# Patient Record
Sex: MALE | Race: WHITE | Employment: PART TIME | URBAN - METROPOLITAN AREA
[De-identification: names, ages, dates, MRNs, and addresses within clinical notes are randomized per-mention and may not be internally consistent; named-entity substitution may affect disease eponyms.]

---

## 2020-11-23 ENCOUNTER — APPOINTMENT (OUTPATIENT)
Dept: CT IMAGING | Age: 60
DRG: 194 | End: 2020-11-23
Attending: STUDENT IN AN ORGANIZED HEALTH CARE EDUCATION/TRAINING PROGRAM
Payer: MEDICAID

## 2020-11-23 ENCOUNTER — APPOINTMENT (OUTPATIENT)
Dept: GENERAL RADIOLOGY | Age: 60
DRG: 194 | End: 2020-11-23
Attending: STUDENT IN AN ORGANIZED HEALTH CARE EDUCATION/TRAINING PROGRAM
Payer: MEDICAID

## 2020-11-23 ENCOUNTER — HOSPITAL ENCOUNTER (INPATIENT)
Age: 60
LOS: 7 days | Discharge: HOME | DRG: 194 | End: 2020-11-30
Attending: EMERGENCY MEDICINE | Admitting: HOSPITALIST
Payer: MEDICAID

## 2020-11-23 DIAGNOSIS — N17.9 AKI (ACUTE KIDNEY INJURY) (HCC): ICD-10-CM

## 2020-11-23 DIAGNOSIS — I50.41 ACUTE COMBINED SYSTOLIC AND DIASTOLIC CONGESTIVE HEART FAILURE (HCC): ICD-10-CM

## 2020-11-23 DIAGNOSIS — I48.91 NEW ONSET ATRIAL FIBRILLATION (HCC): Primary | ICD-10-CM

## 2020-11-23 DIAGNOSIS — Z72.0 TOBACCO ABUSE: ICD-10-CM

## 2020-11-23 DIAGNOSIS — I50.21 ACUTE SYSTOLIC CONGESTIVE HEART FAILURE (HCC): ICD-10-CM

## 2020-11-23 DIAGNOSIS — R06.09 DYSPNEA ON EXERTION: ICD-10-CM

## 2020-11-23 DIAGNOSIS — R77.8 ELEVATED TROPONIN: ICD-10-CM

## 2020-11-23 DIAGNOSIS — I42.8 OTHER CARDIOMYOPATHY (HCC): ICD-10-CM

## 2020-11-23 DIAGNOSIS — N28.89 RENAL MASS: ICD-10-CM

## 2020-11-23 PROBLEM — I48.92 NEW ONSET ATRIAL FLUTTER (HCC): Status: ACTIVE | Noted: 2020-11-23

## 2020-11-23 PROBLEM — I50.9 CHF (CONGESTIVE HEART FAILURE) (HCC): Status: ACTIVE | Noted: 2020-11-23

## 2020-11-23 PROBLEM — I25.10 CAD (CORONARY ARTERY DISEASE): Status: ACTIVE | Noted: 2020-11-23

## 2020-11-23 PROBLEM — I50.9 ACUTE CONGESTIVE HEART FAILURE (HCC): Status: ACTIVE | Noted: 2020-11-23

## 2020-11-23 PROBLEM — I10 ESSENTIAL HYPERTENSION: Status: ACTIVE | Noted: 2020-11-23

## 2020-11-23 PROBLEM — J81.1 PULMONARY EDEMA: Status: ACTIVE | Noted: 2020-11-23

## 2020-11-23 LAB
ALBUMIN SERPL-MCNC: 3.4 G/DL (ref 3.5–5)
ALBUMIN/GLOB SERPL: 1 {RATIO} (ref 1.1–2.2)
ALP SERPL-CCNC: 79 U/L (ref 45–117)
ALT SERPL-CCNC: 40 U/L (ref 12–78)
ANION GAP SERPL CALC-SCNC: 5 MMOL/L (ref 5–15)
APPEARANCE UR: CLEAR
AST SERPL-CCNC: 31 U/L (ref 15–37)
BACTERIA URNS QL MICRO: NEGATIVE /HPF
BASOPHILS # BLD: 0 K/UL (ref 0–0.1)
BASOPHILS NFR BLD: 0 % (ref 0–1)
BILIRUB SERPL-MCNC: 0.9 MG/DL (ref 0.2–1)
BILIRUB UR QL: NEGATIVE
BNP SERPL-MCNC: 6410 PG/ML
BUN SERPL-MCNC: 31 MG/DL (ref 6–20)
BUN/CREAT SERPL: 20 (ref 12–20)
CALCIUM SERPL-MCNC: 8.4 MG/DL (ref 8.5–10.1)
CHLORIDE SERPL-SCNC: 109 MMOL/L (ref 97–108)
CO2 SERPL-SCNC: 23 MMOL/L (ref 21–32)
COLOR UR: ABNORMAL
COMMENT, HOLDF: NORMAL
CREAT SERPL-MCNC: 1.58 MG/DL (ref 0.7–1.3)
DIFFERENTIAL METHOD BLD: NORMAL
EOSINOPHIL # BLD: 0.2 K/UL (ref 0–0.4)
EOSINOPHIL NFR BLD: 2 % (ref 0–7)
EPITH CASTS URNS QL MICRO: ABNORMAL /LPF
ERYTHROCYTE [DISTWIDTH] IN BLOOD BY AUTOMATED COUNT: 13.9 % (ref 11.5–14.5)
GLOBULIN SER CALC-MCNC: 3.5 G/DL (ref 2–4)
GLUCOSE SERPL-MCNC: 103 MG/DL (ref 65–100)
GLUCOSE UR STRIP.AUTO-MCNC: NEGATIVE MG/DL
HCT VFR BLD AUTO: 40.6 % (ref 36.6–50.3)
HGB BLD-MCNC: 13.6 G/DL (ref 12.1–17)
HGB UR QL STRIP: NEGATIVE
HYALINE CASTS URNS QL MICRO: ABNORMAL /LPF (ref 0–5)
IMM GRANULOCYTES # BLD AUTO: 0 K/UL (ref 0–0.04)
IMM GRANULOCYTES NFR BLD AUTO: 0 % (ref 0–0.5)
INR PPP: 1.1 (ref 0.9–1.1)
KETONES UR QL STRIP.AUTO: NEGATIVE MG/DL
LACTATE SERPL-SCNC: 1.4 MMOL/L (ref 0.4–2)
LEUKOCYTE ESTERASE UR QL STRIP.AUTO: NEGATIVE
LIPASE SERPL-CCNC: 67 U/L (ref 73–393)
LYMPHOCYTES # BLD: 2.5 K/UL (ref 0.8–3.5)
LYMPHOCYTES NFR BLD: 24 % (ref 12–49)
MAGNESIUM SERPL-MCNC: 2.2 MG/DL (ref 1.6–2.4)
MCH RBC QN AUTO: 31.8 PG (ref 26–34)
MCHC RBC AUTO-ENTMCNC: 33.5 G/DL (ref 30–36.5)
MCV RBC AUTO: 94.9 FL (ref 80–99)
MONOCYTES # BLD: 1 K/UL (ref 0–1)
MONOCYTES NFR BLD: 10 % (ref 5–13)
NEUTS SEG # BLD: 6.7 K/UL (ref 1.8–8)
NEUTS SEG NFR BLD: 64 % (ref 32–75)
NITRITE UR QL STRIP.AUTO: NEGATIVE
NRBC # BLD: 0 K/UL (ref 0–0.01)
NRBC BLD-RTO: 0 PER 100 WBC
PH UR STRIP: 5 [PH] (ref 5–8)
PLATELET # BLD AUTO: 254 K/UL (ref 150–400)
PMV BLD AUTO: 9.7 FL (ref 8.9–12.9)
POTASSIUM SERPL-SCNC: 4.3 MMOL/L (ref 3.5–5.1)
PROT SERPL-MCNC: 6.9 G/DL (ref 6.4–8.2)
PROT UR STRIP-MCNC: 30 MG/DL
PROTHROMBIN TIME: 11.4 SEC (ref 9–11.1)
RBC # BLD AUTO: 4.28 M/UL (ref 4.1–5.7)
RBC #/AREA URNS HPF: ABNORMAL /HPF (ref 0–5)
SAMPLES BEING HELD,HOLD: NORMAL
SODIUM SERPL-SCNC: 137 MMOL/L (ref 136–145)
SP GR UR REFRACTOMETRY: 1.02 (ref 1–1.03)
TROPONIN I SERPL-MCNC: 0.16 NG/ML
UR CULT HOLD, URHOLD: NORMAL
UROBILINOGEN UR QL STRIP.AUTO: 0.2 EU/DL (ref 0.2–1)
WBC # BLD AUTO: 10.5 K/UL (ref 4.1–11.1)
WBC URNS QL MICRO: ABNORMAL /HPF (ref 0–4)

## 2020-11-23 PROCEDURE — 83880 ASSAY OF NATRIURETIC PEPTIDE: CPT

## 2020-11-23 PROCEDURE — 84484 ASSAY OF TROPONIN QUANT: CPT

## 2020-11-23 PROCEDURE — 81001 URINALYSIS AUTO W/SCOPE: CPT

## 2020-11-23 PROCEDURE — 96375 TX/PRO/DX INJ NEW DRUG ADDON: CPT

## 2020-11-23 PROCEDURE — 96372 THER/PROPH/DIAG INJ SC/IM: CPT

## 2020-11-23 PROCEDURE — 83605 ASSAY OF LACTIC ACID: CPT

## 2020-11-23 PROCEDURE — 80053 COMPREHEN METABOLIC PANEL: CPT

## 2020-11-23 PROCEDURE — 74011250637 HC RX REV CODE- 250/637: Performed by: STUDENT IN AN ORGANIZED HEALTH CARE EDUCATION/TRAINING PROGRAM

## 2020-11-23 PROCEDURE — 83735 ASSAY OF MAGNESIUM: CPT

## 2020-11-23 PROCEDURE — 36415 COLL VENOUS BLD VENIPUNCTURE: CPT

## 2020-11-23 PROCEDURE — 71275 CT ANGIOGRAPHY CHEST: CPT

## 2020-11-23 PROCEDURE — 99285 EMERGENCY DEPT VISIT HI MDM: CPT

## 2020-11-23 PROCEDURE — 85025 COMPLETE CBC W/AUTO DIFF WBC: CPT

## 2020-11-23 PROCEDURE — 85610 PROTHROMBIN TIME: CPT

## 2020-11-23 PROCEDURE — 74011000636 HC RX REV CODE- 636: Performed by: RADIOLOGY

## 2020-11-23 PROCEDURE — 71045 X-RAY EXAM CHEST 1 VIEW: CPT

## 2020-11-23 PROCEDURE — 74011250636 HC RX REV CODE- 250/636: Performed by: STUDENT IN AN ORGANIZED HEALTH CARE EDUCATION/TRAINING PROGRAM

## 2020-11-23 PROCEDURE — 74011000250 HC RX REV CODE- 250: Performed by: STUDENT IN AN ORGANIZED HEALTH CARE EDUCATION/TRAINING PROGRAM

## 2020-11-23 PROCEDURE — 65270000029 HC RM PRIVATE

## 2020-11-23 PROCEDURE — 96365 THER/PROPH/DIAG IV INF INIT: CPT

## 2020-11-23 PROCEDURE — 93005 ELECTROCARDIOGRAM TRACING: CPT

## 2020-11-23 PROCEDURE — 83690 ASSAY OF LIPASE: CPT

## 2020-11-23 RX ORDER — ACETAMINOPHEN 325 MG/1
650 TABLET ORAL
Status: DISCONTINUED | OUTPATIENT
Start: 2020-11-23 | End: 2020-11-30 | Stop reason: HOSPADM

## 2020-11-23 RX ORDER — ONDANSETRON 2 MG/ML
4 INJECTION INTRAMUSCULAR; INTRAVENOUS
Status: DISCONTINUED | OUTPATIENT
Start: 2020-11-23 | End: 2020-11-30 | Stop reason: HOSPADM

## 2020-11-23 RX ORDER — DILTIAZEM HYDROCHLORIDE 30 MG/1
30 TABLET, FILM COATED ORAL
Status: DISCONTINUED | OUTPATIENT
Start: 2020-11-24 | End: 2020-11-24

## 2020-11-23 RX ORDER — DILTIAZEM HYDROCHLORIDE 120 MG/1
120 CAPSULE, COATED, EXTENDED RELEASE ORAL DAILY
Status: DISCONTINUED | OUTPATIENT
Start: 2020-11-24 | End: 2020-11-23

## 2020-11-23 RX ORDER — POLYETHYLENE GLYCOL 3350 17 G/17G
17 POWDER, FOR SOLUTION ORAL DAILY PRN
Status: DISCONTINUED | OUTPATIENT
Start: 2020-11-23 | End: 2020-11-30 | Stop reason: HOSPADM

## 2020-11-23 RX ORDER — DILTIAZEM HYDROCHLORIDE 30 MG/1
30 TABLET, FILM COATED ORAL
Status: COMPLETED | OUTPATIENT
Start: 2020-11-23 | End: 2020-11-23

## 2020-11-23 RX ORDER — ENOXAPARIN SODIUM 100 MG/ML
80 INJECTION SUBCUTANEOUS
Status: COMPLETED | OUTPATIENT
Start: 2020-11-23 | End: 2020-11-23

## 2020-11-23 RX ORDER — SODIUM CHLORIDE 0.9 % (FLUSH) 0.9 %
5-40 SYRINGE (ML) INJECTION EVERY 8 HOURS
Status: DISCONTINUED | OUTPATIENT
Start: 2020-11-23 | End: 2020-11-30 | Stop reason: HOSPADM

## 2020-11-23 RX ORDER — ENOXAPARIN SODIUM 100 MG/ML
40 INJECTION SUBCUTANEOUS DAILY
Status: DISCONTINUED | OUTPATIENT
Start: 2020-11-24 | End: 2020-11-24

## 2020-11-23 RX ORDER — ACETAMINOPHEN 650 MG/1
650 SUPPOSITORY RECTAL
Status: DISCONTINUED | OUTPATIENT
Start: 2020-11-23 | End: 2020-11-30 | Stop reason: HOSPADM

## 2020-11-23 RX ORDER — DILTIAZEM HYDROCHLORIDE 5 MG/ML
10 INJECTION INTRAVENOUS
Status: COMPLETED | OUTPATIENT
Start: 2020-11-23 | End: 2020-11-23

## 2020-11-23 RX ORDER — LISINOPRIL 40 MG/1
40 TABLET ORAL DAILY
COMMUNITY
End: 2020-11-30

## 2020-11-23 RX ORDER — SODIUM CHLORIDE 0.9 % (FLUSH) 0.9 %
5-40 SYRINGE (ML) INJECTION AS NEEDED
Status: DISCONTINUED | OUTPATIENT
Start: 2020-11-23 | End: 2020-11-30 | Stop reason: HOSPADM

## 2020-11-23 RX ORDER — ATORVASTATIN CALCIUM 80 MG/1
80 TABLET, FILM COATED ORAL DAILY
COMMUNITY

## 2020-11-23 RX ORDER — FUROSEMIDE 10 MG/ML
40 INJECTION INTRAMUSCULAR; INTRAVENOUS 2 TIMES DAILY
Status: DISCONTINUED | OUTPATIENT
Start: 2020-11-24 | End: 2020-11-24

## 2020-11-23 RX ORDER — FUROSEMIDE 10 MG/ML
40 INJECTION INTRAMUSCULAR; INTRAVENOUS ONCE
Status: COMPLETED | OUTPATIENT
Start: 2020-11-23 | End: 2020-11-23

## 2020-11-23 RX ADMIN — IOPAMIDOL 80 ML: 755 INJECTION, SOLUTION INTRAVENOUS at 19:48

## 2020-11-23 RX ADMIN — DILTIAZEM HYDROCHLORIDE 30 MG: 30 TABLET, FILM COATED ORAL at 18:25

## 2020-11-23 RX ADMIN — DILTIAZEM HYDROCHLORIDE 10 MG: 5 INJECTION INTRAVENOUS at 18:26

## 2020-11-23 RX ADMIN — ENOXAPARIN SODIUM 80 MG: 80 INJECTION SUBCUTANEOUS at 21:24

## 2020-11-23 RX ADMIN — FUROSEMIDE 40 MG: 10 INJECTION, SOLUTION INTRAMUSCULAR; INTRAVENOUS at 21:24

## 2020-11-23 NOTE — ED PROVIDER NOTES
Clifton Dowell is a 64 y.o. male with past medical history notable for CAD, history of MI, cardiac stents, hypertension. The entirety of his care is at [facility]. He states that he was diagnosed with pneumonia late May and early June 2020 and admitted for. Time, it gradually improved but then over the past month it seems that he is having recurrent identical symptoms of dyspnea on exertion, cough, intermittent fevers with T-max 101 °F.  He has some pleuritic symptoms. Has not had hemoptysis. Denies lower extremity edema present. He has not had a known history of heart failure but he does have notable orthopnea with this current syndrome. Past Medical History:   Diagnosis Date    CAD (coronary artery disease)     Hypertension        Past Surgical History:   Procedure Laterality Date    CARDIAC SURG PROCEDURE UNLIST      stents         No family history on file.     Social History     Socioeconomic History    Marital status: SINGLE     Spouse name: Not on file    Number of children: Not on file    Years of education: Not on file    Highest education level: Not on file   Occupational History    Not on file   Social Needs    Financial resource strain: Not on file    Food insecurity     Worry: Not on file     Inability: Not on file    Transportation needs     Medical: Not on file     Non-medical: Not on file   Tobacco Use    Smoking status: Current Every Day Smoker   Substance and Sexual Activity    Alcohol use: Not on file    Drug use: No    Sexual activity: Not on file   Lifestyle    Physical activity     Days per week: Not on file     Minutes per session: Not on file    Stress: Not on file   Relationships    Social connections     Talks on phone: Not on file     Gets together: Not on file     Attends Hindu service: Not on file     Active member of club or organization: Not on file     Attends meetings of clubs or organizations: Not on file     Relationship status: Not on file   Shena Swain Intimate partner violence     Fear of current or ex partner: Not on file     Emotionally abused: Not on file     Physically abused: Not on file     Forced sexual activity: Not on file   Other Topics Concern    Not on file   Social History Narrative    Not on file         ALLERGIES: Patient has no known allergies. Review of Systems   Constitutional: Negative for chills, fatigue and fever. HENT: Negative for ear pain, sore throat and trouble swallowing. Eyes: Negative for visual disturbance. Respiratory: Positive for cough and shortness of breath. Cardiovascular: Positive for chest pain. Gastrointestinal: Negative for abdominal pain and rectal pain. Genitourinary: Negative for dysuria. Musculoskeletal: Negative for back pain. Skin: Negative for rash. Neurological: Positive for light-headedness. Negative for headaches. Psychiatric/Behavioral: Negative for confusion. All other systems reviewed and are negative. Vitals:    11/23/20 1634   BP: (!) 174/122   Pulse: (!) 155   Resp: 28   Temp: 98 °F (36.7 °C)   SpO2: 96%   Weight: 86.6 kg (191 lb)   Height: 5' 9\" (1.753 m)            Physical Exam  Vitals signs reviewed. Constitutional:       General: He is not in acute distress. HENT:      Head: Normocephalic and atraumatic. Mouth/Throat:      Mouth: Mucous membranes are moist.      Pharynx: Oropharynx is clear. Cardiovascular:      Rate and Rhythm: Tachycardia present. Rhythm irregular. Heart sounds: Normal heart sounds. Pulmonary:      Effort: Pulmonary effort is normal.      Breath sounds: Normal breath sounds. Abdominal:      Tenderness: There is no abdominal tenderness. There is no guarding or rebound. Musculoskeletal: Normal range of motion. Right lower leg: He exhibits no tenderness. No edema. Left lower leg: He exhibits no tenderness. No edema. Skin:     General: Skin is warm and dry. Capillary Refill: Capillary refill takes less than 2 seconds. Neurological:      General: No focal deficit present. Mental Status: He is alert and oriented to person, place, and time. Psychiatric:         Mood and Affect: Mood normal.          MDM  Number of Diagnoses or Management Options  Dyspnea on exertion:   New onset atrial fibrillation ([address]): Other cardiomyopathy [facility]):      Amount and/or Complexity of Data Reviewed  Tests in the medicine section of CPT®: reviewed      ED Course as of Nov 23 1750   Mon Nov 23, 2020   1716 EKG 1641  Atrial flutter with variable AV block, ventricular rate 144, normal axis, no ST elevation or depression. T wave inversion lateral.    [NS]      ED Course User Index  [NS] Liza Cordero MD       Procedures      Perfect Serve Consult for Admission  8:44 PM    ED Room Number: ER15/15  Patient Name and age:  Josiane Liu 64 y.o.  male  Working Diagnosis:   1. New onset atrial fibrillation ([address]    2. Dyspnea on exertion    3. Other cardiomyopathy ([facility]    (rate related cardiomyopathy)    COVID-19 Suspicion:  no  Sepsis present:  no  Reassessment needed: no  Code Status:  Full Code  Readmission: no  Isolation Requirements:  no  Recommended Level of Care:  telemetry  Department:[facility] ED - [phone]  Other:     Josiane Liu is a 64 y.o. male presenting with dyspnea with exertion. Appears to be in rapid tatrial flutter. CTA negative for pulmonary embolus but did have bilateral pleural effusions and vascular congestion. His BNP is elevated. His heart rate has improved with IV and p.o. diltiazem but he persistent his PRIDE. Will start on Lovenox. Would likely benefit from cardiology consultation, echocardiogram.  We will give a dose of Lasix as well.             8:46 PM     I have spent 35 minutes of critical care time involved in lab review, consultations with specialist, family decision-making, and documentation. During this entire length of time I was immediately available to the patient and/or family.     Bess Caraballo Lauren Kumar MD

## 2020-11-23 NOTE — ED TRIAGE NOTES
Patient reports pneumonia dx in may, states was given abx and now has symptoms recurring, states it feels the same as the pneumonia. Patient went to see PCP and was told to come to ER for further eval for rapid HR and elevated BP.

## 2020-11-24 ENCOUNTER — APPOINTMENT (OUTPATIENT)
Dept: ULTRASOUND IMAGING | Age: 60
DRG: 194 | End: 2020-11-24
Attending: INTERNAL MEDICINE
Payer: MEDICAID

## 2020-11-24 ENCOUNTER — APPOINTMENT (OUTPATIENT)
Dept: NON INVASIVE DIAGNOSTICS | Age: 60
DRG: 194 | End: 2020-11-24
Attending: HOSPITALIST
Payer: MEDICAID

## 2020-11-24 PROBLEM — R77.8 ELEVATED TROPONIN: Status: ACTIVE | Noted: 2020-11-24

## 2020-11-24 PROBLEM — I16.0 HYPERTENSIVE URGENCY: Status: ACTIVE | Noted: 2020-11-24

## 2020-11-24 LAB
ALBUMIN SERPL-MCNC: 3.8 G/DL (ref 3.5–5)
ALBUMIN/GLOB SERPL: 1.2 {RATIO} (ref 1.1–2.2)
ALP SERPL-CCNC: 89 U/L (ref 45–117)
ALT SERPL-CCNC: 43 U/L (ref 12–78)
ANION GAP SERPL CALC-SCNC: 7 MMOL/L (ref 5–15)
AST SERPL-CCNC: 34 U/L (ref 15–37)
ATRIAL RATE: 315 BPM
BASOPHILS # BLD: 0 K/UL (ref 0–0.1)
BASOPHILS NFR BLD: 0 % (ref 0–1)
BILIRUB SERPL-MCNC: 1.2 MG/DL (ref 0.2–1)
BNP SERPL-MCNC: 6694 PG/ML
BUN SERPL-MCNC: 31 MG/DL (ref 6–20)
BUN/CREAT SERPL: 18 (ref 12–20)
CALCIUM SERPL-MCNC: 8.6 MG/DL (ref 8.5–10.1)
CALCULATED R AXIS, ECG10: 28 DEGREES
CALCULATED T AXIS, ECG11: 64 DEGREES
CHLORIDE SERPL-SCNC: 107 MMOL/L (ref 97–108)
CHOLEST SERPL-MCNC: 144 MG/DL
CO2 SERPL-SCNC: 25 MMOL/L (ref 21–32)
CREAT SERPL-MCNC: 1.76 MG/DL (ref 0.7–1.3)
DIAGNOSIS, 93000: NORMAL
DIFFERENTIAL METHOD BLD: ABNORMAL
ECHO AO ROOT DIAM: 3.03 CM
ECHO AR MAX VEL PISA: 261.82 CENTIMETER/SECOND
ECHO AV AREA PEAK VELOCITY: 1.04 CM2
ECHO AV AREA/BSA PEAK VELOCITY: 0.5 CM2/M2
ECHO AV PEAK GRADIENT: 7.94 MMHG
ECHO AV PEAK VELOCITY: 140.88 CM/S
ECHO AV REGURGITANT PHT: 545.42 MS
ECHO EST RA PRESSURE: 3 MMHG
ECHO LA AREA 4C: 29.15 CM2
ECHO LA MAJOR AXIS: 4.82 CM
ECHO LA MINOR AXIS: 2.38 CM
ECHO LA VOL 2C: 89.21 ML (ref 18–58)
ECHO LA VOL 4C: 92.57 ML (ref 18–58)
ECHO LA VOL BP: 104.93 ML (ref 18–58)
ECHO LA VOL/BSA BIPLANE: 51.92 ML/M2 (ref 16–28)
ECHO LA VOLUME INDEX A2C: 44.14 ML/M2 (ref 16–28)
ECHO LA VOLUME INDEX A4C: 45.8 ML/M2 (ref 16–28)
ECHO LV E' LATERAL VELOCITY: 7.91 CENTIMETER/SECOND
ECHO LV E' SEPTAL VELOCITY: 2.76 CENTIMETER/SECOND
ECHO LV INTERNAL DIMENSION DIASTOLIC: 6.19 CM (ref 4.2–5.9)
ECHO LV INTERNAL DIMENSION SYSTOLIC: 5.67 CM
ECHO LV IVSD: 1.05 CM (ref 0.6–1)
ECHO LV MASS 2D: 268.8 G (ref 88–224)
ECHO LV MASS INDEX 2D: 133 G/M2 (ref 49–115)
ECHO LV POSTERIOR WALL DIASTOLIC: 1 CM (ref 0.6–1)
ECHO LVOT DIAM: 1.88 CM
ECHO LVOT PEAK GRADIENT: 1.1 MMHG
ECHO LVOT PEAK VELOCITY: 52.49 CM/S
ECHO PV PEAK INSTANTANEOUS GRADIENT SYSTOLIC: 2.38 MMHG
ECHO PV REGURGITANT MAX VELOCITY: 77.19 CM/S
ECHO RIGHT VENTRICULAR SYSTOLIC PRESSURE (RVSP): 34.66 MMHG
ECHO RV INTERNAL DIMENSION: 3.8 CM
ECHO RV TAPSE: 1.16 CM (ref 1.5–2)
ECHO TV REGURGITANT MAX VELOCITY: 281.33 CM/S
ECHO TV REGURGITANT PEAK GRADIENT: 31.66 MMHG
EOSINOPHIL # BLD: 0.2 K/UL (ref 0–0.4)
EOSINOPHIL NFR BLD: 2 % (ref 0–7)
ERYTHROCYTE [DISTWIDTH] IN BLOOD BY AUTOMATED COUNT: 13.7 % (ref 11.5–14.5)
GLOBULIN SER CALC-MCNC: 3.1 G/DL (ref 2–4)
GLUCOSE SERPL-MCNC: 90 MG/DL (ref 65–100)
HCT VFR BLD AUTO: 41.7 % (ref 36.6–50.3)
HDLC SERPL-MCNC: 34 MG/DL
HDLC SERPL: 4.2 {RATIO} (ref 0–5)
HGB BLD-MCNC: 14 G/DL (ref 12.1–17)
IMM GRANULOCYTES # BLD AUTO: 0.1 K/UL (ref 0–0.04)
IMM GRANULOCYTES NFR BLD AUTO: 1 % (ref 0–0.5)
LA VOL DISK BP: 97.07 ML (ref 18–58)
LDLC SERPL CALC-MCNC: 84.8 MG/DL (ref 0–100)
LIPID PROFILE,FLP: NORMAL
LYMPHOCYTES # BLD: 2.9 K/UL (ref 0.8–3.5)
LYMPHOCYTES NFR BLD: 26 % (ref 12–49)
MCH RBC QN AUTO: 32 PG (ref 26–34)
MCHC RBC AUTO-ENTMCNC: 33.6 G/DL (ref 30–36.5)
MCV RBC AUTO: 95.2 FL (ref 80–99)
MONOCYTES # BLD: 1.2 K/UL (ref 0–1)
MONOCYTES NFR BLD: 11 % (ref 5–13)
NEUTS SEG # BLD: 6.7 K/UL (ref 1.8–8)
NEUTS SEG NFR BLD: 60 % (ref 32–75)
NRBC # BLD: 0 K/UL (ref 0–0.01)
NRBC BLD-RTO: 0 PER 100 WBC
PLATELET # BLD AUTO: 268 K/UL (ref 150–400)
PMV BLD AUTO: 9.7 FL (ref 8.9–12.9)
POTASSIUM SERPL-SCNC: 4.4 MMOL/L (ref 3.5–5.1)
PROT SERPL-MCNC: 6.9 G/DL (ref 6.4–8.2)
Q-T INTERVAL, ECG07: 300 MS
QRS DURATION, ECG06: 86 MS
QTC CALCULATION (BEZET), ECG08: 464 MS
RBC # BLD AUTO: 4.38 M/UL (ref 4.1–5.7)
SODIUM SERPL-SCNC: 139 MMOL/L (ref 136–145)
TRIGL SERPL-MCNC: 126 MG/DL (ref ?–150)
TROPONIN I SERPL-MCNC: 0.14 NG/ML
TROPONIN I SERPL-MCNC: 0.18 NG/ML
VENTRICULAR RATE, ECG03: 144 BPM
VLDLC SERPL CALC-MCNC: 25.2 MG/DL
WBC # BLD AUTO: 10.9 K/UL (ref 4.1–11.1)

## 2020-11-24 PROCEDURE — 74011250636 HC RX REV CODE- 250/636: Performed by: NURSE PRACTITIONER

## 2020-11-24 PROCEDURE — 74011000258 HC RX REV CODE- 258: Performed by: NURSE PRACTITIONER

## 2020-11-24 PROCEDURE — 36415 COLL VENOUS BLD VENIPUNCTURE: CPT

## 2020-11-24 PROCEDURE — 65660000000 HC RM CCU STEPDOWN

## 2020-11-24 PROCEDURE — 96366 THER/PROPH/DIAG IV INF ADDON: CPT

## 2020-11-24 PROCEDURE — 96376 TX/PRO/DX INJ SAME DRUG ADON: CPT

## 2020-11-24 PROCEDURE — 84484 ASSAY OF TROPONIN QUANT: CPT

## 2020-11-24 PROCEDURE — 74011250637 HC RX REV CODE- 250/637: Performed by: STUDENT IN AN ORGANIZED HEALTH CARE EDUCATION/TRAINING PROGRAM

## 2020-11-24 PROCEDURE — 74011250637 HC RX REV CODE- 250/637: Performed by: HOSPITALIST

## 2020-11-24 PROCEDURE — 80061 LIPID PANEL: CPT

## 2020-11-24 PROCEDURE — 76700 US EXAM ABDOM COMPLETE: CPT

## 2020-11-24 PROCEDURE — 93306 TTE W/DOPPLER COMPLETE: CPT

## 2020-11-24 PROCEDURE — 83880 ASSAY OF NATRIURETIC PEPTIDE: CPT

## 2020-11-24 PROCEDURE — 80053 COMPREHEN METABOLIC PANEL: CPT

## 2020-11-24 PROCEDURE — 74011250636 HC RX REV CODE- 250/636: Performed by: STUDENT IN AN ORGANIZED HEALTH CARE EDUCATION/TRAINING PROGRAM

## 2020-11-24 PROCEDURE — 93306 TTE W/DOPPLER COMPLETE: CPT | Performed by: STUDENT IN AN ORGANIZED HEALTH CARE EDUCATION/TRAINING PROGRAM

## 2020-11-24 PROCEDURE — 74011250636 HC RX REV CODE- 250/636: Performed by: HOSPITALIST

## 2020-11-24 PROCEDURE — 74011250636 HC RX REV CODE- 250/636: Performed by: INTERNAL MEDICINE

## 2020-11-24 PROCEDURE — 96372 THER/PROPH/DIAG INJ SC/IM: CPT

## 2020-11-24 PROCEDURE — 99223 1ST HOSP IP/OBS HIGH 75: CPT | Performed by: STUDENT IN AN ORGANIZED HEALTH CARE EDUCATION/TRAINING PROGRAM

## 2020-11-24 PROCEDURE — 85025 COMPLETE CBC W/AUTO DIFF WBC: CPT

## 2020-11-24 PROCEDURE — 96365 THER/PROPH/DIAG IV INF INIT: CPT

## 2020-11-24 RX ORDER — METOPROLOL TARTRATE 25 MG/1
25 TABLET, FILM COATED ORAL 2 TIMES DAILY
Status: DISCONTINUED | OUTPATIENT
Start: 2020-11-24 | End: 2020-11-24

## 2020-11-24 RX ORDER — LOSARTAN POTASSIUM 50 MG/1
50 TABLET ORAL DAILY
Status: DISCONTINUED | OUTPATIENT
Start: 2020-11-24 | End: 2020-11-30 | Stop reason: HOSPADM

## 2020-11-24 RX ORDER — ENOXAPARIN SODIUM 100 MG/ML
80 INJECTION SUBCUTANEOUS EVERY 12 HOURS
Status: DISCONTINUED | OUTPATIENT
Start: 2020-11-24 | End: 2020-11-25

## 2020-11-24 RX ORDER — FUROSEMIDE 10 MG/ML
80 INJECTION INTRAMUSCULAR; INTRAVENOUS 2 TIMES DAILY
Status: DISCONTINUED | OUTPATIENT
Start: 2020-11-24 | End: 2020-11-29

## 2020-11-24 RX ORDER — HYDRALAZINE HYDROCHLORIDE 20 MG/ML
20 INJECTION INTRAMUSCULAR; INTRAVENOUS
Status: DISCONTINUED | OUTPATIENT
Start: 2020-11-24 | End: 2020-11-30 | Stop reason: HOSPADM

## 2020-11-24 RX ORDER — GUAIFENESIN 100 MG/5ML
81 LIQUID (ML) ORAL DAILY
Status: DISCONTINUED | OUTPATIENT
Start: 2020-11-25 | End: 2020-11-30 | Stop reason: HOSPADM

## 2020-11-24 RX ORDER — HEPARIN SODIUM 5000 [USP'U]/ML
5000 INJECTION, SOLUTION INTRAVENOUS; SUBCUTANEOUS EVERY 8 HOURS
Status: DISCONTINUED | OUTPATIENT
Start: 2020-11-24 | End: 2020-11-24

## 2020-11-24 RX ORDER — CARVEDILOL 12.5 MG/1
12.5 TABLET ORAL 2 TIMES DAILY WITH MEALS
Status: DISCONTINUED | OUTPATIENT
Start: 2020-11-24 | End: 2020-11-30 | Stop reason: HOSPADM

## 2020-11-24 RX ORDER — ATORVASTATIN CALCIUM 20 MG/1
80 TABLET, FILM COATED ORAL DAILY
Status: DISCONTINUED | OUTPATIENT
Start: 2020-11-24 | End: 2020-11-30 | Stop reason: HOSPADM

## 2020-11-24 RX ORDER — ASPIRIN 325 MG
325 TABLET ORAL ONCE
Status: COMPLETED | OUTPATIENT
Start: 2020-11-24 | End: 2020-11-24

## 2020-11-24 RX ORDER — AMIODARONE HYDROCHLORIDE 200 MG/1
400 TABLET ORAL 3 TIMES DAILY
Status: DISCONTINUED | OUTPATIENT
Start: 2020-11-24 | End: 2020-11-26

## 2020-11-24 RX ADMIN — ENOXAPARIN SODIUM 80 MG: 80 INJECTION SUBCUTANEOUS at 21:28

## 2020-11-24 RX ADMIN — LOSARTAN POTASSIUM 50 MG: 50 TABLET, FILM COATED ORAL at 11:30

## 2020-11-24 RX ADMIN — AMIODARONE HYDROCHLORIDE 0.5 MG/MIN: 50 INJECTION, SOLUTION INTRAVENOUS at 16:04

## 2020-11-24 RX ADMIN — FUROSEMIDE 40 MG: 10 INJECTION, SOLUTION INTRAMUSCULAR; INTRAVENOUS at 08:04

## 2020-11-24 RX ADMIN — FUROSEMIDE 80 MG: 10 INJECTION, SOLUTION INTRAMUSCULAR; INTRAVENOUS at 17:46

## 2020-11-24 RX ADMIN — CARVEDILOL 12.5 MG: 12.5 TABLET, FILM COATED ORAL at 16:19

## 2020-11-24 RX ADMIN — METOPROLOL TARTRATE 25 MG: 25 TABLET, FILM COATED ORAL at 03:40

## 2020-11-24 RX ADMIN — AMIODARONE HYDROCHLORIDE 400 MG: 200 TABLET ORAL at 16:19

## 2020-11-24 RX ADMIN — Medication 10 ML: at 00:33

## 2020-11-24 RX ADMIN — METOPROLOL TARTRATE 25 MG: 25 TABLET, FILM COATED ORAL at 08:04

## 2020-11-24 RX ADMIN — ASPIRIN 325 MG: 325 TABLET ORAL at 03:41

## 2020-11-24 RX ADMIN — AMIODARONE HYDROCHLORIDE 400 MG: 200 TABLET ORAL at 21:28

## 2020-11-24 RX ADMIN — ACETAMINOPHEN 650 MG: 325 TABLET ORAL at 00:38

## 2020-11-24 RX ADMIN — Medication 10 ML: at 16:03

## 2020-11-24 RX ADMIN — AMIODARONE HYDROCHLORIDE 150 MG: 50 INJECTION, SOLUTION INTRAVENOUS at 09:40

## 2020-11-24 RX ADMIN — ENOXAPARIN SODIUM 80 MG: 80 INJECTION SUBCUTANEOUS at 09:39

## 2020-11-24 RX ADMIN — Medication 10 ML: at 07:04

## 2020-11-24 RX ADMIN — AMIODARONE HYDROCHLORIDE 1 MG/MIN: 50 INJECTION, SOLUTION INTRAVENOUS at 09:40

## 2020-11-24 RX ADMIN — ATORVASTATIN CALCIUM 80 MG: 20 TABLET, FILM COATED ORAL at 08:04

## 2020-11-24 RX ADMIN — DILTIAZEM HYDROCHLORIDE 30 MG: 30 TABLET, FILM COATED ORAL at 07:03

## 2020-11-24 RX ADMIN — AMIODARONE HYDROCHLORIDE 0.5 MG/MIN: 50 INJECTION, SOLUTION INTRAVENOUS at 16:40

## 2020-11-24 NOTE — PROGRESS NOTES
11/24/2020  11:02 AM  Case management note    Reason for Admission:   CHF  Patient came to ED for rapid heart rate and and elevated BP. Patient is independent and lives alone and drives   Per Dr. Uche Brown his EF is about 20%. Son at bedside Feliz Alvarez, [address] [place]  I requested a financial screen, and patient was given a card card application. RUR Score:        12%             Plan for utilizing home health:      Patient is independent and will most likely not qualify for home health services    PCP: First and Last name:  No PCP, saw Dr. Maryam Gooden yesterday   Name of Practice:    Are you a current patient: Yes/No: would like to continue   Approximate date of last visit: 1 day   Can you participate in a virtual visit with your PCP:                     Current Advanced Directive/Advance Care Plan: No AD, ACP note to chart                         Transition of Care Plan:       1. Home with family assistance  2. PCP follow up  3. Card follow up  4. AD planning  5. Financial assistance  6.  CM to follow for discharge needs                   Care Management Interventions  PCP Verified by CM: Yes(Dr. Maryam Gooden)  Mode of Transport at Discharge: Self  Current Support Network: Lives Alone  Confirm Follow Up Transport: Family  [address]

## 2020-11-24 NOTE — NURSE NAVIGATOR
Chart reviewed by Heart Failure Nurse Navigator. Heart Failure database completed. Patient admitted with shortness of breath, new HFrEF. Patient with history of CAD s/p CABG and stents. Patient has not followed up with Cardiology due to uninsured status. EF:  20 to 25% with mildly dilated LV, LV diastolic dysfunction, moderately reduced RV systolic function,  Moderate MR. ACEi/ARB/ARNi: Losartan 50 mg daily. BB: Coreg 12.5 mg BID. Aldosterone Antagonist: Per Cardiology, consider adding pending clinical course. Obstructive Sleep Apnea Screening:   STOP-BANG score:   Referred to Sleep Medicine:     CRT Not currently indicated. NYHA Functional Class **. Heart Failure Teach Back in Patient Education. Heart Failure Avoiding Triggers on Discharge Instructions. Cardiologist: Dr. Varela Erp discharge follow up phone call to be made within 48-72 hours of discharge.

## 2020-11-24 NOTE — PROGRESS NOTES
[facility]: MED RECONCILIATION    Information obtained from: patient, Rx query    Significant PMH/Disease States:   Past Medical History:   Diagnosis Date    CAD (coronary artery disease)     Hypertension      Chief Complaint for this Admission:   Chief Complaint   Patient presents with    Shortness of Breath    Rapid Heart Rate     Allergies: Patient has no known allergies. Prior to Admission Medications:     Medication Documentation Review Audit       Reviewed by Radha Contreras PHARMD (Pharmacist) on 11/23/20 at 2203      Medication Sig Documenting Provider Last Dose Status Taking?   aspirin 81 mg chewable tablet Take 81 mg by mouth daily. Jessica, MD Yodit 11/22/2020 am Active Yes   atorvastatin (LIPITOR) 80 mg tablet Take 80 mg by mouth daily. Provider, Historical 11/22/2020 am Active Yes   lisinopriL (PRINIVIL, ZESTRIL) 40 mg tablet Take 40 mg by mouth daily. Provider, Historical 11/22/2020 am Active Yes   metoprolol (LOPRESSOR) 25 mg tablet Take 25 mg by mouth daily. Yodit Lopez MD 11/22/2020 am Active Yes           Med Note (Venessa Guevara   Mon Nov 23, 2020 10:01 PM) Confirmed with patient that he only takes metoprolol tartrated 25 mg once daily                  Thank you for the consult,  Aguila Antony

## 2020-11-24 NOTE — ED NOTES
0730: Bedside and Verbal shift change report given to Sasha RN (oncoming nurse) by Sanna Gale RN (offgoing nurse). Report included the following information SBAR, Kardex, ED Summary, Intake/Output, Recent Results and Cardiac Rhythm A Flutter. Introduced myself as primary RN. Assumed care of patient. Updated, discussed and explained plan of care to patient, including patient's expectations of visit. Pt given opportunity to ask questions. Pt advised that medical information will be discussed and it is their own responsibility to tell nurse if such conversation should not take place in the presence of visitors. Pt verbalizes understanding. Pt denies any additional complaints at this time. Pt resting on stretcher in low/locked position. Call bell in reach. Pt care whiteboard updated and approximate length of time for test results explained to patient.     Dina Miller RN

## 2020-11-24 NOTE — CONSULTS
Avery Guillory DO  [facility] [address] [place] [address], [address] [facility], [id number] [place] [address]                                       Office [phone],WC [phone]  Office [phone],RQB [phone]      Date of  Admission: 11/23/2020  4:43 PM  PCP- None    Clara Guy is a 64 y.o. male admitted for CHF (congestive heart failure) ([facility] [address] [facility] [I50.9]. Consult requested by Lucas Reagan DO    Assessment/Plan       Acute likely on chronic decompensated congestive heart failure, HFrEF    Mildly elevated troponin, not acute coronary syndrome. Ischemic cardiomyopathy    Coronary artery disease status post CABG approximately 10 years ago at [facility] by the [facility] with subsequent stenting in 2018    Functional mitral regurgitation    Atrial flutter    Probable chronic kidney disease, likely due to cardiomyopathy. We will continue to monitor    Tobacco abuse      Complex case. Patient essentially has not seen cardiology as an outpatient. After his bypass he did not follow-up with cardiology due to lack of insurance. In 2018 he fell off a ladder and at that time he underwent subsequent stenting of his coronary arteries. He did not follow-up with cardiology after that time. Intermittently saw primary care as an outpatient. Intermittently taking medical regimen. Lack of follow-up has been driven by lack of insurance. Echocardiogram today shows severe reduction in LV function with biatrial enlargement with moderate mitral regurgitation.      - For management of atrial flutter with rapid ventricular response we will continue IV amiodarone x24 hours and begin loading with p.o. amiodarone. Long-term recommend cardioversion and subsequent atrial flutter ablation. Would like to stabilize his decompensated heart failure prior to management of his atrial flutter.   Also will need to assure insurance with Medicaid before proceeding with atrial flutter ablation. Lovenox for anticoagulation therapy, will transition to [address] therapy    -Likely will benefit from heart catheterization, can be performed as an outpatient since we are heading into the holidays. Will defer for tomorrow due to decompensated heart failure    -Titrate Lasix to 80 mg twice daily. Goal -2 to 3 L daily.    -Initiate medical therapy with carvedilol 12.5 mg twice daily and losartan 50 mg daily. Long-term will transition losartan to [facility] therapy pending Medicaid    -Consider spironolactone pending clinical course    -Aspirin and statin therapy for his coronary artery disease         I appreciate the opportunity to be involved in [address]. See below note for details. Please do not hesitate to contact us with questions or concerns. Keaton Hong,       Subjective:  Jasmin Guerrero is a 64 y.o. male presents to the ED with increased shortness of breath. Patient has a history of coronary artery disease status post prior CABG and stenting. No longitudinal care for his cardiovascular care due to lack of insurance. He continues to smoke. Reports not feeling well since the middle of the summer. Progressive exertional dyspnea for several months. Orthopnea for a few days. On arrival to the ED he was found to be in atrial flutter with rapid ventricular response. His labs in the ED were most notable for a mild elevation in his creatinine with a GFR of 40. Chest CT unremarkable except for mild vascular congestion and mild pleural effusions. Troponin minimally elevated 0.18. No ongoing exertional chest pain symptoms. Stable exertional dyspnea    Past Medical History:   Diagnosis Date    CAD (coronary artery disease)     Hypertension       Past Surgical History:   Procedure Laterality Date    CARDIAC SURG PROCEDURE UNLIST      stents     No Known Allergies  No family history on file.    Social History     Tobacco Use    Smoking status: Current Every Day Smoker   Substance Use Topics  Alcohol use: Not on file    Drug use: No          Medications:  (Not in a hospital admission)    Current Facility-Administered Medications   Medication Dose Route Frequency    hydrALAZINE (APRESOLINE) 20 mg/mL injection 20 mg  20 mg IntraVENous Q6H PRN    [START ON 11/25/2020] aspirin chewable tablet 81 mg  81 mg Oral DAILY    atorvastatin (LIPITOR) tablet 80 mg  80 mg Oral DAILY    enoxaparin (LOVENOX) injection 80 mg  80 mg SubCUTAneous Q12H    amiodarone (CORDARONE) 375 mg in dextrose 5% 250 mL infusion  0.5-1 mg/min IntraVENous TITRATE    losartan (COZAAR) tablet 50 mg  50 mg Oral DAILY    furosemide (LASIX) injection 80 mg  80 mg IntraVENous BID    carvediloL (COREG) tablet 12.5 mg  12.5 mg Oral BID WITH MEALS    sodium chloride (NS) flush 5-40 mL  5-40 mL IntraVENous Q8H    sodium chloride (NS) flush 5-40 mL  5-40 mL IntraVENous PRN    acetaminophen (TYLENOL) tablet 650 mg  650 mg Oral Q6H PRN    Or    acetaminophen (TYLENOL) suppository 650 mg  650 mg Rectal Q6H PRN    polyethylene glycol (MIRALAX) packet 17 g  17 g Oral DAILY PRN    ondansetron (ZOFRAN) injection 4 mg  4 mg IntraVENous Q6H PRN     Current Outpatient Medications   Medication Sig    lisinopriL (PRINIVIL, ZESTRIL) 40 mg tablet Take 40 mg by mouth daily.  atorvastatin (LIPITOR) 80 mg tablet Take 80 mg by mouth daily.  aspirin 81 mg chewable tablet Take 81 mg by mouth daily.  metoprolol (LOPRESSOR) 25 mg tablet Take 25 mg by mouth daily.          Review of Systems:  Pertinent Positive: Exertional dyspnea, orthopnea  Pertinent Negative: No chest pain  All Other systems reviewed and are negative for a Comprehensive ROS (10+)        Physical Exam:  Visit Vitals  BP (!) 150/88   Pulse (!) 102   Temp 97.8 °F (36.6 °C)   Resp 27   Ht 5' 9\" (1.753 m)   Wt 190 lb (86.2 kg)   SpO2 97%   BMI 28.06 kg/m²           Gen: Well-developed, well-nourished, in no acute distress  HEENT:  Pink conjunctivae, hearing intact to voice, moist mucous membranes  Neck: Supple,No JVD, No Carotid Bruit  Resp: No accessory muscle use, Clear breath sounds, No rales or rhonchi  Card: Normal Rate,Regular Rythm,Normal S1, S2, No murmurs, rubs or gallop. No thrills. Abd:  Soft, non-tender, non-distended, normoactive bowel sounds are present   MSK: No cyanosis or clubbing  Skin: No rashes or ulcers  Neuro:  Cranial nerves are grossly intact, moving all four extremities, no focal deficit, follows commands appropriately  Psych:  Good insight, oriented to person, place and time, alert, Nml Affect  LE: Trace edema  Vascular:Distal Pulses 2+ and symmetric      Telemetry: Atrial flutter    EK/23/20   ECHO ADULT COMPLETE 2020    Narrative · LV: Estimated LVEF is 20 - 25%. Mildly dilated left ventricle. Upper   normal wall thickness. Severely reduced systolic function. Left   ventricular diastolic dysfunction. The findings are consistent with   ischemic cardiomyopathy. · RV: Moderately reduced systolic function. · LA: Moderately dilated left atrium. · RA: Mildly dilated right atrium. · AV: Sclerosis with reduced excursion, no stenosis. · MV: Mitral valve leaflet calcification. Mild mitral annular   calcification. Moderate mitral valve regurgitation is present. · TV: Mild to moderate tricuspid valve regurgitation is present. · PA: Mild pulmonary hypertension. Pulmonary arterial systolic pressure is   40 mmHg. · IVC: Moderately elevated central venous pressure (10-15 mmHg); IVC   diameter is larger than 21 mm and collapses more than 50% with   respiration.         Signed by: Ewa Kearney DO         LABS:    Lab Results   Component Value Date/Time    WBC 10.9 2020 03:50 AM    HGB 14.0 2020 03:50 AM    HCT 41.7 2020 03:50 AM    PLATELET 406  03:50 AM    MCV 95.2 2020 03:50 AM     Lab Results   Component Value Date/Time    Sodium 139 2020 03:50 AM    Potassium 4.4 2020 03:50 AM    Chloride 107 11/24/2020 03:50 AM    CO2 25 11/24/2020 03:50 AM    Anion gap 7 11/24/2020 03:50 AM    Glucose 90 11/24/2020 03:50 AM    BUN 31 (H) 11/24/2020 03:50 AM    Creatinine 1.76 (H) 11/24/2020 03:50 AM    BUN/Creatinine ratio 18 11/24/2020 03:50 AM    GFR est AA 48 (L) 11/24/2020 03:50 AM    GFR est non-AA 40 (L) 11/24/2020 03:50 AM    Calcium 8.6 11/24/2020 03:50 AM     Lab Results   Component Value Date/Time    Troponin-I, Qt. 0.18 (H) 11/24/2020 03:50 AM     Lab Results   Component Value Date/Time    aPTT 27.8 07/29/2009 02:30 PM     Lab Results   Component Value Date/Time    INR 1.1 11/23/2020 04:59 PM    INR 1.0 07/29/2009 02:30 PM    Prothrombin time 11.4 (H) 11/23/2020 04:59 PM    Prothrombin time 10.1 07/29/2009 02:30 PM           Fernando Villegas DO

## 2020-11-24 NOTE — PROGRESS NOTES
Bedside and Verbal shift change report given to JANIS Her RN (oncoming nurse) by SHAWN Caicedo RN (offgoing nurse). Report included the following information SBAR, Kardex, ED Summary, Intake/Output, MAR, Recent Results and Cardiac Rhythm ST/A flutter.

## 2020-11-24 NOTE — ACP (ADVANCE CARE PLANNING)
11/24/2020  11:14 AM  Advance Care Planning     Advance Care Planning Activator (Inpatient)  Conversation Note      Date of ACP Conversation: 11/24/20     Conversation Conducted with:   Patient with Decision Making Capacity    ACP Activator: [id number] [id number] Decision Maker:    Current Designated Health Care Decision Maker:   Primary Decision Maker: Alejo Ruth - [phone]    Care Preferences    Ventilation: \"If you were in your present state of health and suddenly became very ill and were unable to breathe on your own, what would your preference be about the use of a ventilator (breathing machine) if it were available to you? \"      If patient would desire the use of a ventilator (breathing machine), answer \"yes\", if not \"no\":yes    \"If your health worsens and it becomes clear that your chance of recovery is unlikely, what would your preference be about the use of a ventilator (breathing machine) if it were available to you? \"     Would the patient desire the use of a ventilator (breathing machine)? NO      Resuscitation  \"CPR works best to restart the heart when there is a sudden event, like a heart attack, in someone who is otherwise healthy. Unfortunately, CPR does not typically restart the heart for people who have serious health conditions or who are very sick. \"    \"In the event your heart stopped as a result of an underlying serious health condition, would you want attempts to be made to restart your heart (answer \"yes\" for attempt to resuscitate) or would you prefer a natural death (answer \"no\" for do not attempt to resuscitate)? \" no          [x] Yes  [] No   Educated Patient  regarding differences between Advance Directives and portable DNR orders.     Length of ACP Conversation in minutes:  10 minutes    Conversation Outcomes:  [x] ACP discussion completed  [] Existing advance directive reviewed with patient; no changes to patient's previously recorded wishes     [] New Advance Directive completed   [] Portable Do Not Resuscitate prepared for Provider review and signature  [] POLST/POST/MOLST/MOST prepared for Provider review and signature      Follow-up plan:    [x] Schedule follow-up conversation to continue planning  [x] Referred individual to Provider for additional questions/concerns   [] Advised patient/agent/surrogate to review completed ACP document and update if needed with changes in condition, patient preferences or care setting     [] This note routed to one or more involved healthcare providers

## 2020-11-24 NOTE — PROGRESS NOTES
Geoffrey [facility] 79  [address]  [phone]      Medical Progress Note      NAME: Rober Bain   :  1960  MRM:  [id number]    Date/Time of service: 2020  7:25 AM       Subjective:     Chief Complaint:  Patient was personally seen and examined by me during this time period. Chart reviewed. HR better controlled overnight. Review of Systems:     Gen:   , , , , fatigue  Eyes:  , ,   ENT:   , , , ,   CVS:   Palpitations, , , , , , PRIDE, orthopnea  Pulm: , , , ,   GI:       Abd pain, na, , , , , ,   :     , , , ,   MS:     , , ,   Skin:   , , , ,   Psy:    , , , , , ,   Endo: , , ,   Hem:  , , ,   Bandar:   , , , ,   Heather:   , , , , , , ,             Objective:       Vitals:       Last 24hrs VS reviewed since prior progress note.  Most recent are:    Visit Vitals  BP (!) 133/109 (BP 1 Location: Left arm, BP Patient Position: Sitting)   Pulse 90   Temp 98.7 °F (37.1 °C)   Resp 26   Ht 5' 9\" (1.753 m)   Wt 86.2 kg (190 lb)   SpO2 95%   BMI 28.06 kg/m²     SpO2 Readings from Last 6 Encounters:   20 95%   11 98%            Intake/Output Summary (Last 24 hours) at 2020 0725  Last data filed at 2020 2100  Gross per 24 hour   Intake    Output 500 ml   Net -500 ml        Exam:     Physical Exam:    Gen:   in no acute distress  HEENT:  Pink conjunctivae, PERRL  Resp:  No accessory muscle use, clear breath sounds without wheezes rales or rhonchi  Card:   No murmurs, normal S1, S2, no peripheral edema  Abd:  Soft,  pain, non-distended, normoactive bowel sounds are present  Lymph:  No cervical adenopathy  Musc:  No cyanosis or clubbing  Skin:  No rashes or ulcers, skin turgor is good  Neuro:  Cranial nerves 3-12 are grossly intact, strength 5/5 bilaterally UEs and LEs, follows commands appropriately  Psych:  Oriented to person, place, and time, Alert with good insight      Medications Reviewed: (see below)    Lab Data Reviewed: (see below)    ______________________________________________________________________    Medications:     Current Facility-Administered Medications   Medication Dose Route Frequency    hydrALAZINE (APRESOLINE) 20 mg/mL injection 20 mg  20 mg IntraVENous Q6H PRN    [START ON 11/25/2020] aspirin chewable tablet 81 mg  81 mg Oral DAILY    metoprolol tartrate (LOPRESSOR) tablet 25 mg  25 mg Oral BID    atorvastatin (LIPITOR) tablet 80 mg  80 mg Oral DAILY    sodium chloride (NS) flush 5-40 mL  5-40 mL IntraVENous Q8H    sodium chloride (NS) flush 5-40 mL  5-40 mL IntraVENous PRN    acetaminophen (TYLENOL) tablet 650 mg  650 mg Oral Q6H PRN    Or    acetaminophen (TYLENOL) suppository 650 mg  650 mg Rectal Q6H PRN    polyethylene glycol (MIRALAX) packet 17 g  17 g Oral DAILY PRN    ondansetron (ZOFRAN) injection 4 mg  4 mg IntraVENous Q6H PRN    enoxaparin (LOVENOX) injection 40 mg  40 mg SubCUTAneous DAILY    furosemide (LASIX) injection 40 mg  40 mg IntraVENous BID    dilTIAZem IR (CARDIZEM) tablet 30 mg  30 mg Oral TIDAC     Current Outpatient Medications   Medication Sig    lisinopriL (PRINIVIL, ZESTRIL) 40 mg tablet Take 40 mg by mouth daily.  atorvastatin (LIPITOR) 80 mg tablet Take 80 mg by mouth daily.  aspirin 81 mg chewable tablet Take 81 mg by mouth daily.  metoprolol (LOPRESSOR) 25 mg tablet Take 25 mg by mouth daily.           Lab Review:     Recent Labs     11/24/20  0350 11/23/20  1659   WBC 10.9 10.5   HGB 14.0 13.6   HCT 41.7 40.6    254     Recent Labs     11/24/20  0350 11/23/20  1659    137   K 4.4 4.3    109*   CO2 25 23   GLU 90 103*   BUN 31* 31*   CREA 1.76* 1.58*   CA 8.6 8.4*   MG  --  2.2   ALB 3.8 3.4*   TBILI 1.2* 0.9   ALT 43 40   INR  --  1.1     No results found for: GLUCPOC       Assessment / Plan:   Mr. Aruna Augustine is a 64 y.o. male with history of CAD status post MI and stents along with hypertension presented with dyspnea especially on exertion and orthopnea. Found to be in atrial flutter with RVR responded to Cardizem. Also given Lasix for pulmonary vascular congestion with elevated BNP. No echo in chart     New onset atrial flutter with RVR POA:  Now better controlled. Unknown trigger. C/w diltiazem and beta blocker. C/w therapeutic Lovenox. Echo pending. Cardiology consulted     Elevated troponin/NSTEMI: Likely due to demand due to tachycardia. Has trended up from 0.16-0. 18. Continue to trend. Echo pending. Continue with aspirin and statin. Cardio consulted     Acute congestive heart failure:  Likely due to atrial flutter. Continue with IV Lasix, beta-blocker. Echo pending. Cardiology consulted.     Hypertensive urgency POA: Likely related to CHF exacerbation. Continue with p.o. Lopressor and Cardizem but hold home ACE due to ANNAMARIA. Hydralazine IV as needed.        ANNAMARIA (acute kidney injury): baseline 0.73. Worsening. Avoid nephrotoxins. Obtain bladder scan. Obtain renal ultrasound and consult renal if continues to worsen. Spoke with pharmacy ok to c/w lovenox as long as renal clearance above 30. Dyspnea on exertion (11/23/2020) POA: Likely multifactorial treat underlying factors listed above.     RUQ ABD pain: unknown etiology. Lipase unremarkable. Obtain abd Us. CAD (coronary artery disease) (11/23/2020) with history of MI and stents: Continue home meds including aspirin, statin but hold lisinopril due to ANNAMARIA.       Tobacco abuse (11/23/2020):   Counseled on cessation.       Body mass index is 28.06 kg/m².: 25.0 - 29.9 Overweight       *patient switched from diltiazem to amiodarone     Total time spent with patient: 39 Minutes **I personally saw and examined the patient during this time period**                 Care Plan discussed with: Patient and Nursing Staff    Discussed:  Care Plan    Prophylaxis:  Lovenox    Disposition:  Home w/Family           ___________________________________________________    Attending Physician: Shirin Abebe, DO

## 2020-11-24 NOTE — DISCHARGE INSTRUCTIONS
Patient Education   Patient Education        Learning About Benefits From Quitting Smoking  How does quitting smoking make you healthier? If you're thinking about quitting smoking, you may have a few reasons to be smoke-free. Your health may be one of them. · When you quit smoking, you lower your risks for cancer, lung disease, heart attack, stroke, blood vessel disease, and blindness from macular degeneration. · When you're smoke-free, you get sick less often, and you heal faster. You are less likely to get colds, flu, bronchitis, and pneumonia. · As a nonsmoker, you may find that your mood is better and you are less stressed. When and how will you feel healthier? Quitting has real health benefits that start from day 1 of being smoke-free. And the longer you stay smoke-free, the healthier you get and the better you feel. The first hours  · After just 20 minutes, your blood pressure and heart rate go down. That means there's less stress on your heart and blood vessels. · Within 12 hours, the level of carbon monoxide in your blood drops back to normal. That makes room for more oxygen. With more oxygen in your body, you may notice that you have more energy than when you smoked. After 2 weeks  · Your lungs start to work better. · Your risk of heart attack starts to drop. After 1 month  · When your lungs are clear, you cough less and breathe deeper, so it's easier to be active. · Your sense of taste and smell return. That means you can enjoy food more than you have since you started smoking. Over the years  · Over the years, your risks of heart disease, heart attack, and stroke are lower. · After 10 years, your risk of dying from lung cancer is cut by about half. And your risk for many other types of cancer is lower too. How would quitting help others in your life? When you quit smoking, you improve the health of everyone who now breathes in your smoke.   · Their heart, lung, and cancer risks drop, much like yours. · They are sick less. For babies and small children, living smoke-free means they're less likely to have ear infections, pneumonia, and bronchitis. · If you're a woman who is or will be pregnant someday, quitting smoking means a healthier . · Children who are close to you are less likely to become adult smokers. Where can you learn more? Go to http://www.gray.com/  Enter O319 in the search box to learn more about \"Learning About Benefits From Quitting Smoking. \"  Current as of: 2020               Content Version: 12.6  © 3922-1043 [employer]. Care instructions adapted under license by [employer] (which disclaims liability or warranty for this information). If you have questions about a medical condition or this instruction, always ask your healthcare professional. [address] any warranty or liability for your use of this information. Avoiding Triggers With Heart Failure: Care Instructions  Your Care Instructions     Triggers are anything that make your heart failure flare up. A flare-up is also called \"sudden heart failure\" or \"acute heart failure. \" When you have a flare-up, fluid builds up in your lungs, and you have problems breathing. You might need to go to the hospital. By watching for changes in your condition and avoiding triggers, you can prevent heart failure flare-ups. Follow-up care is a key part of your treatment and safety. Be sure to make and go to all appointments, and call your doctor if you are having problems. It's also a good idea to know your test results and keep a list of the medicines you take. How can you care for yourself at home? Watch for changes in your weight and condition  · Weigh yourself without clothing at the same time each day. Record your weight.  Call your doctor if you have sudden weight gain, such as more than 2 to 3 pounds in a day or 5 pounds in a week. (Your doctor may suggest a different range of weight gain.) A sudden weight gain may mean that your heart failure is getting worse. · Keep a daily record of your symptoms. Write down any changes in how you feel, such as new shortness of breath, cough, or problems eating. Also record if your ankles are more swollen than usual and if you feel more tired than usual. Note anything that you ate or did that could have triggered these changes. Limit sodium  Sodium causes your body to hold on to extra water. This may cause your heart failure symptoms to get worse. People get most of their sodium from processed foods. Fast food and restaurant meals also tend to be very high in sodium. · Your doctor may suggest that you limit sodium. Your doctor can tell you how much sodium is right for you. This includes limiting sodium in cooked and packaged foods. · Read food labels on cans and food packages. They tell you how much sodium you get in one serving. Check the serving size. If you eat more than one serving, you are getting more sodium. · Be aware that sodium can come in forms other than salt, including monosodium glutamate (MSG), sodium citrate, and sodium bicarbonate (baking soda). MSG is often added to Asian food. You can sometimes ask for food without MSG or salt. · Slowly reducing salt will help you adjust to the taste. Take the salt shaker off the table. · Flavor your food with garlic, lemon juice, onion, vinegar, herbs, and spices instead of salt. Do not use soy sauce, steak sauce, onion salt, garlic salt, mustard, or ketchup on your food, unless it is labeled \"low-sodium\" or \"low-salt. \"  · Make your own salad dressings, sauces, and ketchup without adding salt. · Use fresh or frozen ingredients, instead of canned ones, whenever you can. Choose low-sodium canned goods. · Eat less processed food and food from restaurants, including fast food.   Exercise as directed  Moderate, regular exercise is very good for your heart. It improves your blood flow and helps control your weight. But too much exercise can stress your heart and cause a heart failure flare-up. · Check with your doctor before you start an exercise program.  · Walking is an easy way to get exercise. Start out slowly. Gradually increase the length and pace of your walk. Swimming, riding a bike, and using a treadmill are also good forms of exercise. · When you exercise, watch for signs that your heart is working too hard. You are pushing yourself too hard if you cannot talk while you are exercising. If you become short of breath or dizzy or have chest pain, stop, sit down, and rest.  · Do not exercise when you do not feel well. Take medicines correctly  · Take your medicines exactly as prescribed. Call your doctor if you think you are having a problem with your medicine. · Make a list of all the medicines you take. Include those prescribed to you by other doctors and any over-the-counter medicines, vitamins, or supplements you take. Take this list with you when you go to any doctor. · Take your medicines at the same time every day. It may help you to post a list of all the medicines you take every day and what time of day you take them. · Make taking your medicine as simple as you can. Plan times to take your medicines when you are doing other things, such as eating a meal or getting ready for bed. This will make it easier to remember to take your medicines. · Get organized. Use helpful tools, such as daily or weekly pill containers. When should you call for help? Call 911 if you have symptoms of sudden heart failure such as:    · You have severe trouble breathing.     · You cough up pink, foamy mucus.     · You have a new irregular or rapid heartbeat.    Call your doctor now or seek immediate medical care if:    · You have new or increased shortness of breath.     · You are dizzy or lightheaded, or you feel like you may faint.     · You have sudden weight gain, such as more than 2 to 3 pounds in a day or 5 pounds in a week. (Your doctor may suggest a different range of weight gain.)     · You have increased swelling in your legs, ankles, or feet.     · You are suddenly so tired or weak that you cannot do your usual activities. Watch closely for changes in your health, and be sure to contact your doctor if you develop new symptoms. Where can you learn more? Go to http://www.gray.com/  Enter V089 in the search box to learn more about \"Avoiding Triggers With Heart Failure: Care Instructions. \"  Current as of: December 16, 2019               Content Version: 12.6  © 2211-5022 [employer]. Care instructions adapted under license by [employer] (which disclaims liability or warranty for this information). If you have questions about a medical condition or this instruction, always ask your healthcare professional. [address] any warranty or liability for your use of this information.

## 2020-11-24 NOTE — H&P
[employer]  [address] [place] [address] 19  [phone]     Hospitalist Admission Note      NAME: Rober Bain   :  1960   MRN:  [id number]     Date/Time:  2020 10:43 PM    Patient PCP: None    Emergency Contact:    Extended Emergency Contact Information  Primary Emergency Contact: Carl Somers  Address: [address] [place] [address], [address] [place] [address] Street Phone: [phone]  Relation: Parent      Code: Full Code    Isolation Precautions: There are currently no Active Isolations        Subjective:     CHIEF COMPLAINT: Shortness of breath    HISTORY OF PRESENT ILLNESS:     Mr. Margaret Harvey is a 64 y.o. male with history of CAD with MI and stents presents with new onset symptomatic atrial flutter with a rapid ventricular response at a rate 155 that is associated with palpitation, orthopnea, and moderate dyspnea on exertion which has been worsening for the past couple of weeks. Given IV diltiazem then p.o. which has improved the rate currently in the upper 90s to low 100s. However he was also found to have a slightly elevated troponin along with an elevated BNP. Blood pressure also elevated with SBP in the 150s to 170s and DBP was as high as 120s. Chest x-ray did show some cardiomegaly with bilateral effusions but the CT vascular congestion and pleural effusions. He was given IV Lasix in the ER. Vagal maneuver revealed a flutter wave. No Known Allergies    Prior to Admission medications    Medication Sig Start Date End Date Taking? Authorizing Provider   lisinopriL (PRINIVIL, ZESTRIL) 40 mg tablet Take 40 mg by mouth daily. Yes Provider, Historical   atorvastatin (LIPITOR) 80 mg tablet Take 80 mg by mouth daily. Yes Provider, Historical   aspirin 81 mg chewable tablet Take 81 mg by mouth daily. Yes Other, MD Yodit   metoprolol (LOPRESSOR) 25 mg tablet Take 25 mg by mouth daily.  11  Yes Other, MD Yodit       Past Medical History:   Diagnosis Date    CAD (coronary artery disease)     Hypertension         Past Surgical History:   Procedure Laterality Date    CARDIAC SURG PROCEDURE UNLIST      stents       Social History     Tobacco Use    Smoking status: Current Every Day Smoker   Substance Use Topics    Alcohol use: Not on file        FH: Father and paternal grandfather both with early CAD and MIs in their 45s and 46s. [address] and medications were reviewed. Review of Systems (14 poit ROS):  (bold if positive, if negative)    Gen:   , , , , fatigueEyes:  , , ENT:   , , , , CVS:   Palpitations, , , , , , PRIDE, orthopneaPulm: , , , , GI:       , , , , , , , :     , , , , MS:     , , , Skin:   , , , , Psy:    , , , , , , Endo: , , , Hem:  , , , Bandar:   , , , , Heather:   , , , , , , ,         Objective:      VITALS:    Vital signs reviewed; most recent are:    Visit Vitals  BP (!) 145/107   Pulse 100   Temp 98.6 °F (37 °C)   Resp 23   Ht 5' 9\" (1.753 m)   Wt 86.2 kg (190 lb)   SpO2 94%   BMI 28.06 kg/m²     SpO2 Readings from Last 6 Encounters:   11/23/20 94%   04/17/11 98%            Intake/Output Summary (Last 24 hours) at 11/23/2020 2243  Last data filed at 11/23/2020 2100  Gross per 24 hour   Intake    Output 500 ml   Net -500 ml        Exam:     Physical Exam:    General:  Alert, cooperative, NAD  Head: Normocephalic, atraumatic  Eyes: PERRL and EOMI sclera clear  ENT: Lips, mucosa, and tongue normal.   Neck: supple, no tenderness  Lungs:  CTA with good BS and normal effort  Heart: S1-S2, irregularly-irregular, tachycardic, did not appreciate murmur. Vagal maneuver revealed a flutter wave. Abd: SNTBS(+), No HSM  Ext: no cyanosis, no edema    Pulses: 2+ and symmetric  Skin: Skin color, texture, turgor normal. No rashes or lesions  Neuro: Alert and oriented x 4.   Cranial nerves II through XII grossly intact nonfocal exam.  Psych: Not anxious, cooperative, normal affect    Labs:    Recent Labs     11/23/20  1659   WBC 10.5   HGB 13.6   HCT 40.6        Recent Labs     11/23/20  1659      K 4.3   *   CO2 23   *   BUN 31*   CREA 1.58*   CA 8.4*   MG 2.2   ALB 3.4*   TBILI 0.9   ALT 40     No results found for: GLUCPOC  No results for input(s): PH, PCO2, PO2, HCO3, FIO2 in the last 72 hours. Recent Labs     11/23/20  1659   INR 1.1       Radiology and EKG reviewed:     Cta Chest W Or W Wo Cont    Result Date: 11/23/2020  IMPRESSION: 1. No evidence pulmonary embolism. 2. Small right pleural effusion and right basilar atelectasis. Trace left pleural fluid. Mild cardiomegaly. Mild pulmonary vascular congestive changes appear     Xr Chest Port    Result Date: 11/23/2020  IMPRESSION: Mild cardiomegaly. Clear lungs. I personally reviewed and interpreted the imaging studies and agree with official reading. EKG personally reviewed showing A-Flutter with variable block in RVR at a rate of 144    **Chart reviewed in [facility]**       Assessment/Plan:    Mr. Evelyn Morales is a 64 y.o. male with history of CAD status post MI and stents along with hypertension presented with dyspnea especially on exertion and orthopnea. Found to be in atrial flutter with RVR responded to Cardizem. Also given Lasix for pulmonary vascular congestion with elevated BNP. No echo in chart      New onset atrial flutter ([address] (11/23/2020) POA: Admit for further work-up and treatment. We will continue with diltiazem p.o. Will order his Lopressor per med rec. Consult cardiology. Echocardiogram.      Acute congestive heart failure ([address] (11/23/2020) POA possibly hypertensive related and exacerbated by the atrial flutter: Lasix IV has been responding well with diuresis. Lopressor. Nitrate. Echocardiogram cardiology consult. Hypertensive urgency (11/24/2020) POA: Hydralazine IV as needed. Per med rec he is on Lopressor and Prinivil the first I will order but the Prinivil will hold off due to the renal function at this point.       Elevated troponin (11/24/2020) slightly elevated at 0.16 POA: Likely rate related but possibly demand ischemia/NSTEMI type II. Give aspirin, statin as at home and continue to treat as above. Trend troponin and check lipid panel. ANNAMARIA (acute kidney injury) ([facility] (11/23/2020) creatinine 1.58 POA baseline 0.73: Monitor labs avoid nephrotoxic agents and as mentioned lisinopril be held for now. Consider ultrasound of kidneys and/or nephrology consult if worsening. Dyspnea on exertion (11/23/2020) POA: Likely multifactorial treat underlying factors listed above. CAD (coronary artery disease) (11/23/2020) with history of MI and stents: Continue home meds except lisinopril. Tobacco abuse (11/23/2020): The patient was counseled on the dangers of tobacco use, and was advised to quit. Reviewed strategies to maximize success, including removing cigarettes and smoking materials from environment, stress management and written materials. Nicotine patch   Not ordered due to current condition. Patient appears hopeful and willing to quit. Smoking cessation counseling time:Moderate for 3 to 10 minutes [id number]. Will need handout on discharge.       Body mass index is 28.06 kg/m².: 25.0 - 29.9 Overweight      Risk of deterioration: high                   Care Plan discussed with: Patient, Nursing Staff, >50% of time spent in counseling and coordination of care and ER physician    Discussed:  Code Status and Care Plan    Prophylaxis:  Lovenox    Probable Disposition:  Home w/Family    Patient was seen:  Before midnight 11/23/2020 10:43 PM               ___________________________________________________    Admitting Physician: Hannah Harris MD

## 2020-11-24 NOTE — ED NOTES
Bedside and Verbal shift change report given to [employer] (oncoming nurse) by Cady Roach (offgoing nurse). Report included the following information SBAR, Kardex, ED Summary, Intake/Output, MAR and Recent Results.  Kenton Quispe

## 2020-11-24 NOTE — ED NOTES
TRANSFER - OUT REPORT:    Verbal report given to Jenna Montoya RN (name) on Blossom Cranker  being transferred to [facility] (unit) for routine progression of care       Report consisted of patients Situation, Background, Assessment and   Recommendations(SBAR). Information from the following report(s) SBAR, Kardex, ED Summary, Intake/Output, Recent Results and Cardiac Rhythm A flutter was reviewed with the receiving nurse. Lines:   Peripheral IV 04/27/08 Right Cephalic (Active)   Site Assessment Clean, dry, & intact 11/24/20 0733   Phlebitis Assessment 0 11/24/20 0733   Infiltration Assessment 0 11/24/20 0733   Dressing Status Clean, dry, & intact 11/24/20 0733   Dressing Type Transparent 11/24/20 0733   Hub Color/Line Status Pink 11/24/20 0733   Action Taken Open ports on tubing capped 11/24/20 0733   Alcohol Cap Used Yes 11/24/20 0293        Opportunity for questions and clarification was provided.       Patient transported with:   Monitor  Registered Nurse/Tech on monitor

## 2020-11-25 ENCOUNTER — APPOINTMENT (OUTPATIENT)
Dept: CT IMAGING | Age: 60
DRG: 194 | End: 2020-11-25
Attending: NURSE PRACTITIONER
Payer: MEDICAID

## 2020-11-25 LAB
ALBUMIN SERPL-MCNC: 3.7 G/DL (ref 3.5–5)
ALBUMIN/GLOB SERPL: 1.1 {RATIO} (ref 1.1–2.2)
ALP SERPL-CCNC: 80 U/L (ref 45–117)
ALT SERPL-CCNC: 59 U/L (ref 12–78)
ANION GAP SERPL CALC-SCNC: 8 MMOL/L (ref 5–15)
AST SERPL-CCNC: 52 U/L (ref 15–37)
BASOPHILS # BLD: 0 K/UL (ref 0–0.1)
BASOPHILS NFR BLD: 0 % (ref 0–1)
BILIRUB SERPL-MCNC: 1.1 MG/DL (ref 0.2–1)
BNP SERPL-MCNC: 4558 PG/ML
BUN SERPL-MCNC: 44 MG/DL (ref 6–20)
BUN/CREAT SERPL: 19 (ref 12–20)
CALCIUM SERPL-MCNC: 8.2 MG/DL (ref 8.5–10.1)
CHLORIDE SERPL-SCNC: 106 MMOL/L (ref 97–108)
CO2 SERPL-SCNC: 26 MMOL/L (ref 21–32)
COMMENT, HOLDF: NORMAL
CREAT SERPL-MCNC: 2.37 MG/DL (ref 0.7–1.3)
DIFFERENTIAL METHOD BLD: ABNORMAL
EOSINOPHIL # BLD: 0.1 K/UL (ref 0–0.4)
EOSINOPHIL NFR BLD: 1 % (ref 0–7)
ERYTHROCYTE [DISTWIDTH] IN BLOOD BY AUTOMATED COUNT: 14 % (ref 11.5–14.5)
GLOBULIN SER CALC-MCNC: 3.5 G/DL (ref 2–4)
GLUCOSE SERPL-MCNC: 120 MG/DL (ref 65–100)
HCT VFR BLD AUTO: 42.4 % (ref 36.6–50.3)
HGB BLD-MCNC: 14 G/DL (ref 12.1–17)
IMM GRANULOCYTES # BLD AUTO: 0.1 K/UL (ref 0–0.04)
IMM GRANULOCYTES NFR BLD AUTO: 0 % (ref 0–0.5)
LYMPHOCYTES # BLD: 2.8 K/UL (ref 0.8–3.5)
LYMPHOCYTES NFR BLD: 25 % (ref 12–49)
MCH RBC QN AUTO: 31.6 PG (ref 26–34)
MCHC RBC AUTO-ENTMCNC: 33 G/DL (ref 30–36.5)
MCV RBC AUTO: 95.7 FL (ref 80–99)
MONOCYTES # BLD: 0.9 K/UL (ref 0–1)
MONOCYTES NFR BLD: 8 % (ref 5–13)
NEUTS SEG # BLD: 7.2 K/UL (ref 1.8–8)
NEUTS SEG NFR BLD: 66 % (ref 32–75)
NRBC # BLD: 0 K/UL (ref 0–0.01)
NRBC BLD-RTO: 0 PER 100 WBC
PLATELET # BLD AUTO: 298 K/UL (ref 150–400)
PMV BLD AUTO: 10 FL (ref 8.9–12.9)
POTASSIUM SERPL-SCNC: 4.1 MMOL/L (ref 3.5–5.1)
PROT SERPL-MCNC: 7.2 G/DL (ref 6.4–8.2)
RBC # BLD AUTO: 4.43 M/UL (ref 4.1–5.7)
SAMPLES BEING HELD,HOLD: NORMAL
SODIUM SERPL-SCNC: 140 MMOL/L (ref 136–145)
TSH SERPL DL<=0.05 MIU/L-ACNC: 1.86 UIU/ML (ref 0.36–3.74)
WBC # BLD AUTO: 11.1 K/UL (ref 4.1–11.1)

## 2020-11-25 PROCEDURE — 94760 N-INVAS EAR/PLS OXIMETRY 1: CPT

## 2020-11-25 PROCEDURE — 80053 COMPREHEN METABOLIC PANEL: CPT

## 2020-11-25 PROCEDURE — 83880 ASSAY OF NATRIURETIC PEPTIDE: CPT

## 2020-11-25 PROCEDURE — 99233 SBSQ HOSP IP/OBS HIGH 50: CPT | Performed by: STUDENT IN AN ORGANIZED HEALTH CARE EDUCATION/TRAINING PROGRAM

## 2020-11-25 PROCEDURE — 85025 COMPLETE CBC W/AUTO DIFF WBC: CPT

## 2020-11-25 PROCEDURE — 74011250637 HC RX REV CODE- 250/637: Performed by: NURSE PRACTITIONER

## 2020-11-25 PROCEDURE — 74176 CT ABD & PELVIS W/O CONTRAST: CPT

## 2020-11-25 PROCEDURE — 74011250637 HC RX REV CODE- 250/637: Performed by: STUDENT IN AN ORGANIZED HEALTH CARE EDUCATION/TRAINING PROGRAM

## 2020-11-25 PROCEDURE — 84443 ASSAY THYROID STIM HORMONE: CPT

## 2020-11-25 PROCEDURE — 65660000000 HC RM CCU STEPDOWN

## 2020-11-25 PROCEDURE — 74011250636 HC RX REV CODE- 250/636: Performed by: INTERNAL MEDICINE

## 2020-11-25 PROCEDURE — 74011250636 HC RX REV CODE- 250/636: Performed by: STUDENT IN AN ORGANIZED HEALTH CARE EDUCATION/TRAINING PROGRAM

## 2020-11-25 PROCEDURE — 74011250637 HC RX REV CODE- 250/637: Performed by: HOSPITALIST

## 2020-11-25 RX ADMIN — AMIODARONE HYDROCHLORIDE 400 MG: 200 TABLET ORAL at 16:14

## 2020-11-25 RX ADMIN — ENOXAPARIN SODIUM 80 MG: 80 INJECTION SUBCUTANEOUS at 08:07

## 2020-11-25 RX ADMIN — ATORVASTATIN CALCIUM 80 MG: 20 TABLET, FILM COATED ORAL at 08:06

## 2020-11-25 RX ADMIN — APIXABAN 5 MG: 5 TABLET, FILM COATED ORAL at 10:28

## 2020-11-25 RX ADMIN — ACETAMINOPHEN 650 MG: 325 TABLET ORAL at 18:44

## 2020-11-25 RX ADMIN — APIXABAN 5 MG: 5 TABLET, FILM COATED ORAL at 17:27

## 2020-11-25 RX ADMIN — LOSARTAN POTASSIUM 50 MG: 50 TABLET, FILM COATED ORAL at 08:06

## 2020-11-25 RX ADMIN — AMIODARONE HYDROCHLORIDE 400 MG: 200 TABLET ORAL at 08:06

## 2020-11-25 RX ADMIN — ACETAMINOPHEN 650 MG: 325 TABLET ORAL at 05:05

## 2020-11-25 RX ADMIN — FUROSEMIDE 80 MG: 10 INJECTION, SOLUTION INTRAMUSCULAR; INTRAVENOUS at 17:27

## 2020-11-25 RX ADMIN — AMIODARONE HYDROCHLORIDE 400 MG: 200 TABLET ORAL at 21:30

## 2020-11-25 RX ADMIN — CARVEDILOL 12.5 MG: 12.5 TABLET, FILM COATED ORAL at 08:06

## 2020-11-25 RX ADMIN — CARVEDILOL 12.5 MG: 12.5 TABLET, FILM COATED ORAL at 16:14

## 2020-11-25 RX ADMIN — Medication 10 ML: at 21:31

## 2020-11-25 RX ADMIN — FUROSEMIDE 80 MG: 10 INJECTION, SOLUTION INTRAMUSCULAR; INTRAVENOUS at 08:06

## 2020-11-25 RX ADMIN — ASPIRIN 81 MG: 81 TABLET, CHEWABLE ORAL at 08:06

## 2020-11-25 NOTE — CONSULTS
Urology Consult    Patient: Loretta Lee MRN: [id number]  SSN: [id number]    YOB: 1960  Age: 64 y.o. Sex: male          Date of Consultation:  November 25, 2020  Requesting Physician: Tere Sharma DO  Reason for Consultation:  L renal mass          History of Present Illness:  Loretta Lee is a 64 y.o. male admitted 11/23/2020 to the hospital for CHF (congestive heart failure) ([facility] [I50.9]. He presented to the ER with complaint of SOB . He has pmhx of CAD with MI , and stents has new onset A-fib . During ER workup pt was noted to have ANNAMARIA Cr 1.58 further evaluation with renal u/s noted a L renal mass . Urology has been consulted for renal mass . Pt seen in room c/o back pain R>L attributes pain /discomfort to poor positioning due to [address]  . He has been sleeping in recliner or tripoding for SOB . Pt denies any hx of kidney stones , not currently followed by a Urologist . [facility] [employer] [facility] pending . He is being followed by Cardiology , has noted EF of 20-25% on ECHO. Past Medical History:   Diagnosis Date    CAD (coronary artery disease)     Hypertension         Past Surgical History:   Procedure Laterality Date    CARDIAC SURG PROCEDURE UNLIST      stents       No Known Allergies     Prior to Admission medications    Medication Sig Start Date End Date Taking? Authorizing Provider   lisinopriL (PRINIVIL, ZESTRIL) 40 mg tablet Take 40 mg by mouth daily. Yes Provider, Historical   atorvastatin (LIPITOR) 80 mg tablet Take 80 mg by mouth daily. Yes Provider, Historical   aspirin 81 mg chewable tablet Take 81 mg by mouth daily. Yes Other, MD Yodit   metoprolol (LOPRESSOR) 25 mg tablet Take 25 mg by mouth daily. 4/17/11  Yes Other, MD Yodit       Social History     Tobacco Use    Smoking status: Current Every Day Smoker   Substance Use Topics    Alcohol use: Not on file        No family history on file.      ROS:  Admission ROS from 11/23/2020 was reviewed and changes (other than per HPI) include: none. Physical Exam:    Vital Signs:  Temp (24hrs), Av.7 °F (36.5 °C), Min:97.5 °F (36.4 °C), Max:98 °F (36.7 °C)   Blood pressure 119/75, pulse 70, temperature 97.5 °F (36.4 °C), resp. rate 18, height 5' 9\" (1.753 m), weight 86.2 kg (190 lb), SpO2 100 %. I&O's:  No intake/output data recorded.     Appearance: well-developed with PRIDE  Neck: supple   Respiratory Effort: PRIDE noted  Lower Extremity: Trace edema   Abdomen/Flank: soft non-tender without masses; no CVA tenderness   Liver/Spleen: no organomegaly   Hernia: no ventral hernia   Anus/Perineum: deferrred   Rectal: deferred   Hemoccult: not indicated   Scrotum: without lesions   Testes: deferred  Epididymes: deferred  Penis: deferred  Meatus: deferred  Prostate: sdeferred  SV's: deferred  Lymph: no adenopathy   Skin Inspection: warm and dry   Mood/Affect: normal      Lab Review:     CBC   Recent Labs     20  0358 20  0350 20  1659   WBC 11.1 10.9 10.5   HGB 14.0 14.0 13.6   HCT 42.4 41.7 40.6    268 254     CMP   Recent Labs     20  0358 20  0350 20  1659    139 137   K 4.1 4.4 4.3    107 109*   CO2 26 25 23   * 90 103*   BUN 44* 31* 31*   CREA 2.37* 1.76* 1.58*   CA 8.2* 8.6 8.4*   MG  --   --  2.2   ALB 3.7 3.8 3.4*   TBILI 1.1* 1.2* 0.9   ALT 59 43 40       Other   Recent Labs     20  1659   INR 1.1       UA:   Lab Results   Component Value Date/Time    Color YELLOW/STRAW 2020 06:02 PM    Appearance CLEAR 2020 06:02 PM    Specific gravity 1.021 2020 06:02 PM    pH (UA) 5.0 2020 06:02 PM    Protein 30 (A) 2020 06:02 PM    Glucose Negative 2020 06:02 PM    Ketone Negative 2020 06:02 PM    Bilirubin Negative 2020 06:02 PM    Urobilinogen 0.2 2020 06:02 PM    Nitrites Negative 2020 06:02 PM    Leukocyte Esterase Negative 2020 06:02 PM    Epithelial cells FEW 2020 06:02 PM Bacteria Negative 11/23/2020 06:02 PM    WBC 0-4 11/23/2020 06:02 PM    RBC 0-5 11/23/2020 06:02 PM       Cultures:      Imaging:    Renal U/S    11/24  IMPRESSION  IMPRESSION:   1. A 3.2 x 2.8 x 2.6 cm exophytic isoechoic lesion arising from the lower pole  of the left kidney, which is more conspicuous than on remote CT examination from  2009, and may represent a complex cystic lesion or solid lesion. CT or MRI renal  protocol is recommended for further characterization. 2. No other significant abnormalities identified in the abdomen. 3. Small right pleural effusion.         Assessment:     Active Problems:    New onset atrial flutter ([address] (11/23/2020)      Acute congestive heart failure ([address] (11/23/2020)      CAD (coronary artery disease) (11/23/2020)      ANNAMARIA (acute kidney injury) ([address] (11/23/2020)      Dyspnea on exertion (11/23/2020)      Tobacco abuse (11/23/2020)      Hypertensive urgency (11/24/2020)      Elevated troponin (11/24/2020)          Plan:     1.  L renal mass - incidental finding on U/S . Pt will need to follow up with [facility] in 3-6 month for  renal mass MRI   2. ANNAMARIA - followed by Nephrology   3. Afib - Cardiology following      Thank you for consult , please call for questions   [phone] discussed with Dr. Kit Campa By: Kareem Nance.  Edouard Servin NP  - November 25, 2020

## 2020-11-25 NOTE — PROGRESS NOTES
Spiritual Care Assessment/Progress Note  [employer]      NAME: Deepthi Trevino      MRN: [id number]  AGE: 64 y.o.  SEX: male  Caodaism Affiliation: No Latter-day   Language: English     11/25/2020     Total Time (in minutes): 18     Spiritual Assessment begun in [facility] 3 PRO CARE TELE 1 through conversation with:         [x]Patient        [] Family    [] Friend(s)        Reason for Consult: Advance medical directive consult     Spiritual beliefs: (Please include comment if needed)     [] Identifies with a kyung tradition:         [] Supported by a kyung community:            [] Claims no spiritual orientation:           [] Seeking spiritual identity:                [] Adheres to an individual form of spirituality:           [x] Not able to assess:                           Identified resources for coping:      [] Prayer                               [] Music                  [] Guided Imagery     [x] Family/friends                 [] Pet visits     [] Devotional reading                         [] Unknown     [] Other:                                             Interventions offered during this visit: (See comments for more details)    Patient Interventions: Advance medical directive consult, Catharsis/review of pertinent events in supportive environment           Plan of Care:     [] Support spiritual and/or cultural needs    [x] Support AMD and/or advance care planning process      [] Support grieving process   [] Coordinate Rites and/or Rituals    [] Coordination with community clergy   [] No spiritual needs identified at this time   [] Detailed Plan of Care below (See Comments)  [] Make referral to Music Therapy  [] Make referral to Pet Therapy     [] Make referral to Addiction services  [] Make referral to [facility]  [] Make referral to Spiritual Care Partner  [] No future visits requested        [x] Follow up visits as needed     Comments:      responded to an in-basket request to assist Mr. Nitin Ceron with an Advanced Medical Directive on the [place] [address] unit. Mr. Nitin Ceron was awake, alert, but appeared sleepy, and lying in bed when the  came into the room. He made good eye contact and greeted the  warmly. Mr. Nitin Ceron briefly discussed his illness concerns and his feelings with frustration with having to be in the hospital. He did share that he has made some small steps of progress, but is still waiting on for some test result to come back.  inquired about the Advanced Medical Directive (AMD) request and Mr. Nitin Ceron appeared to be unsure what this request is about.  provided some education regarding the AMD and provided a brief overview. He would like for his son, Jerald Clarke, to be his medical decision maker, but declined to complete the forms at this time as he is feeling too tired. AMD form and the Your Right To Decide brochure was left on his bedside table.  was unable to assess any spiritual or Hindu needs at this time. He thanked the  for her visit and expressed no additional needs at this time. Advised of the 's availability. 's will follow up as able and/or needed  [employer]. Lavell Espino.      Paging Service: [phone])

## 2020-11-25 NOTE — ACP (ADVANCE CARE PLANNING)
responded to an in-basket request to assist Mr. Michael Ballesteros with an Advanced Medical Directive on the [place] [address] unit. Mr. Michael Ballesteros was awake, alert, but appeared sleepy, and lying in bed when the  came into the room. He made good eye contact and greeted the  warmly. Mr. Michael Ballesteros briefly discussed his illness concerns and his feelings with frustration with having to be in the hospital. He did share that he has made some small steps of progress, but is still waiting on for some test result to come back.  inquired about the Advanced Medical Directive (AMD) request and Mr. Michael Ballesteros appeared to be unsure what this request is about.  provided some education regarding the AMD and provided a brief overview. He would like for his son, Brendan Jones, to be his medical decision maker, but declined to complete the forms at this time as he is feeling too tired. AMD form and the Your Right To Decide brochure was left on his bedside table. He thanked the  for her visit and expressed no additional needs at this time. Advised of the 's availability. 's will follow up as able and/or needed  [place]. [facility].      Paging Service: [phone])

## 2020-11-25 NOTE — PROGRESS NOTES
Care Management follow up    Patient admitted for CHF, atrial flutter RVR, cardiomyopathy, EF 20-25%, left renal mass. Hx CAD with CABG and PCI, MR.    RUR score 12%/ low risk    Current status  Patient requiring medical management for fluid overload including IV lasix. Nephrology and urology following. Eliquis started, new medication for patient. Transition of Care Plan  1. Monitor patient status and response to treatment. 2. Continues to require medical management. 3. New on Eliquis, if patient discharged on Eliquis will need assistance with obtaining it, Medicaid pending.   4. Care card application given to patient, medicaid application submitted this am.  5. CM to follow    Janne Phoenix, RN, MSN/Care manager

## 2020-11-25 NOTE — PROGRESS NOTES
[facility]  [address] [facility], [address] [place] [address]  [phone]      Medical Progress Note      NAME: Mary Nguyen   :  1960  MRM:  [id number]    Date/Time of service: 2020  9:58 AM       Subjective:     Chief Complaint:  Patient was personally seen and examined by me during this time period. Chart reviewed. Continues to endorse shortness of breath,  Palpitations and abd pain. Review of Systems:     Gen:   , , ,   Eyes:  , ,   ENT:   , , , ,   CVS:   Palpitations, , , , , , PRIDE  Pulm: , , , ,   GI:       Abd pain, na, , , , , ,   :     , , , ,   MS:     , , ,   Skin:   , , , ,   Psy:    , , , , , ,   Endo: , , ,   Hem:  , , ,   Bandar:   , , , ,   Heather:   , , , , , , ,             Objective:       Vitals:       Last 24hrs VS reviewed since prior progress note.  Most recent are:    Visit Vitals  /75 (BP 1 Location: Left arm, BP Patient Position: Sitting)   Pulse 70   Temp 97.5 °F (36.4 °C)   Resp 18   Ht 5' 9\" (1.753 m)   Wt 86.2 kg (190 lb)   SpO2 100%   BMI 28.06 kg/m²     SpO2 Readings from Last 6 Encounters:   20 100%   11 98%            Intake/Output Summary (Last 24 hours) at 2020 0958  Last data filed at 2020 0700  Gross per 24 hour   Intake 833 ml   Output    Net 833 ml        Exam:     Physical Exam:    Gen:   in no acute distress  HEENT:  Pink conjunctivae, PERRL  Resp:  clear breath sounds without wheezes rales or rhonchi  Card:   No murmurs, normal S1, S2, bilateral peripheral edema  Abd:  Soft, diffuse tenderness to palpation, non-distended, normoactive bowel sounds are present  Lymph:  No cervical adenopathy  Musc:  No cyanosis or clubbing  Skin:  No rashes or ulcers, skin turgor is good  Neuro:  Cranial nerves 3-12 are grossly intact, strength 5/5 bilaterally UEs and LEs, follows commands appropriately  Psych:  Oriented to person, place, and time, Alert with good insight      Medications Reviewed: (see below)    Lab Data Reviewed: (see below)    ______________________________________________________________________    Medications:     Current Facility-Administered Medications   Medication Dose Route Frequency    apixaban (ELIQUIS) tablet 5 mg  5 mg Oral BID    hydrALAZINE (APRESOLINE) 20 mg/mL injection 20 mg  20 mg IntraVENous Q6H PRN    aspirin chewable tablet 81 mg  81 mg Oral DAILY    atorvastatin (LIPITOR) tablet 80 mg  80 mg Oral DAILY    [Held by provider] losartan (COZAAR) tablet 50 mg  50 mg Oral DAILY    furosemide (LASIX) injection 80 mg  80 mg IntraVENous BID    carvediloL (COREG) tablet 12.5 mg  12.5 mg Oral BID WITH MEALS    amiodarone (CORDARONE) tablet 400 mg  400 mg Oral TID    sodium chloride (NS) flush 5-40 mL  5-40 mL IntraVENous Q8H    sodium chloride (NS) flush 5-40 mL  5-40 mL IntraVENous PRN    acetaminophen (TYLENOL) tablet 650 mg  650 mg Oral Q6H PRN    Or    acetaminophen (TYLENOL) suppository 650 mg  650 mg Rectal Q6H PRN    polyethylene glycol (MIRALAX) packet 17 g  17 g Oral DAILY PRN    ondansetron (ZOFRAN) injection 4 mg  4 mg IntraVENous Q6H PRN          Lab Review:     Recent Labs     11/25/20  0358 11/24/20  0350 11/23/20  1659   WBC 11.1 10.9 10.5   HGB 14.0 14.0 13.6   HCT 42.4 41.7 40.6    268 254     Recent Labs     11/25/20  0358 11/24/20  0350 11/23/20  1659    139 137   K 4.1 4.4 4.3    107 109*   CO2 26 25 23   * 90 103*   BUN 44* 31* 31*   CREA 2.37* 1.76* 1.58*   CA 8.2* 8.6 8.4*   MG  --   --  2.2   ALB 3.7 3.8 3.4*   TBILI 1.1* 1.2* 0.9   ALT 59 43 40   INR  --   --  1.1     No results found for: GLUCPOC       Assessment / Plan:   Mr. Margaret Mackey is a 64 y.o. male with history of CAD status post MI and stents along with hypertension presented with dyspnea especially on exertion and orthopnea. Found to be in atrial flutter with RVR responded to Cardizem. Also given Lasix for pulmonary vascular congestion with elevated BNP.   No echo in chart     New onset atrial flutter with RVR POA:  Now better controlled. Unknown trigger. Echo showing reduced systolic function with EF of 20 to 25%. C/w amiodarone and beta blocker. TSH LFTs within normal limit. Switch from Lovenox to Eliquis twice daily. Cardiology following     Elevated troponin/NSTEMI: Likely due to demand due to tachycardia heart failure. Trending down. Continue with aspirin and statin. Cardio following     Acute congestive heart failure:  Likely due to controlled flutter and noncompliance. Continue with IV Lasix, beta-blocker. Plan to transition to [facility] once renal function stabilized. Echo with severe systolic dysfunction as noted above. Cardiology cardiology following     Hypertensive urgency POA: Likely related to CHF exacerbation. Continue with p.o. Lopressor and Lasix; hold ARB. Hydralazine IV as needed.        ANNAMARIA (acute kidney injury): baseline 0.73. Worsening. Avoid nephrotoxins. Obtain renal ultrasound. Abdominal ultrasound noting lesion on left kidney. Also on [facility] records with concern for renal carcinoma? Renal consult pending. Possible further CT or MRI renal system. Dyspnea on exertion (11/23/2020) POA: Likely multifactorial treat underlying factors listed above.     RUQ ABD pain: unknown etiology. Lipase unremarkable. Abdominal ultrasound renal mass as noted above otherwise unremarkable findings. CAD (coronary artery disease) (11/23/2020) with history of MI and stents: Continue home meds including aspirin, statin but hold lisinopril due to ANNAMARIA.       Tobacco abuse (11/23/2020):   Counseled on cessation.       Body mass index is 28.06 kg/m².: 25.0 - 29.9 Overweight       Total time spent with patient: [address] Minutes **I personally saw and examined the patient during this time period**                 Care Plan discussed with: Patient and Nursing Staff    Discussed:  Care Plan    Prophylaxis:  eliquis    Disposition:  Home w/Family           ___________________________________________________    Attending Physician: Bill Falcon DO

## 2020-11-25 NOTE — CONSULTS
[address]     Name:  Margaret Fontenot  MR#:  [id number]  :  1960  ACCOUNT #:  [de-identified]  DATE OF SERVICE:  2020    NEPHROLOGY CONSULTATION    REQUESTING PHYSICIAN:  Dr. Mee Prasad. CHIEF COMPLAINT:  Elevated creatinine. HISTORY OF PRESENT ILLNESS:  The patient is a 53-year-old white male with history of coronary artery disease and ischemic cardiomyopathy. He has had limited medical followup recently. He came to the hospital with shortness of breath and findings consistent with heart failure and has been treated with diuresis. An angiotensin receptor blocker has been initiated. On admission to the hospital, his creatinine was 1.6 and the creatinine has trended up to 2.37 today. He is still symptomatic and is going to get some additional diuresis. We have been asked to see him regarding elevated creatinine. The patient is not aware of any prior renal history. Labs from [facility] from a couple years ago show elevated creatinine in the 1s. He also had imaging at that time that showed a renal mass that appears to have not been further addressed. He has a history of hypertension but was not taking medication regularly prior to admission. He has prior history of coronary artery bypass grafting and subsequent myocardial infarction. REVIEW OF SYSTEMS:  CONSTITUTIONAL:  No fevers or chills. CARDIOVASCULAR:  Positive for dyspnea. Positive for orthopnea. No recent chest pain. GI:  No nausea, vomiting, diarrhea. :  No hematuria, difficulty voiding or foamy urine. All other systems reviewed and are negative except as per history of present illness. PAST MEDICAL HISTORY:  Coronary artery disease, hypertension, coronary artery bypass graft. FAMILY HISTORY:  No family history of renal disease. SOCIAL HISTORY:  He is a smoker. PHYSICAL EXAMINATION:  VITAL SIGNS:  Temperature 97.5, pulse 70, blood pressure 119/75.   GENERAL:  Pleasant white male, in no acute distress. HEENT:  Eyes anicteric. Extraocular movements intact. ENMT, mucous membranes are moist.  There is no epistaxis. NECK:  Nontender. There is no JVD. HEART:  Regular. There is trace to 1+ lower extremity edema. RESPIRATORY:  There are crackles at the bases with good respiratory effort. GI:  Abdomen is soft, nontender. Bowel sounds are active. There is no guarding or rebound. SKIN:  Warm with normal turgor. There are multiple tattoos. There is no rash. MUSCULOSKELETAL:  There is no cyanosis or clubbing. There is no synovitis in fingers or wrists bilaterally. LABORATORY:  Sodium 140, potassium 4.1, chloride 106, bicarb 26, BUN 44, creatinine 2.37. Urinalysis shows 30 mg/dL protein, no blood. RADIOGRAPHIC STUDIES:  CT scan of the chest with contrast shows no pulmonary embolism with cardiomegaly and some pulmonary vascular congestive appearances. Abdominal ultrasound shows no obstruction and a left renal mass. ASSESSMENT:  1. Acute kidney dysfunction likely due to diuresis and contrast.  2.  Cardiorenal syndrome. 3.  Probably some intrinsic renal disease related to smoking and atherosclerosis. 4.  Suspect true baseline creatinine probably abnormal and consistent with chronic kidney disease stage III. 5.  Congestive heart failure. 6.  Renal mass. PLAN:  1. Hold angiotensin receptor blocker due to rising creatinine. Can look at initiating at a later date. 2.  Continue with diuresis as you are doing. 3.  May need inotropes if renal function continues to worsen. 4.  Avoid nephrotoxins. 5.  There is no acute indication for dialysis. 6.  Urology evaluation of renal mass. 7.  We will follow with you to assist in his management during this hospitalization.       Vanessa Ann MD      [id number]  D:  11/25/2020 10:56  T:  11/25/2020 13:33  JOB #:  [id number]

## 2020-11-25 NOTE — PROGRESS NOTES
Bedside shift change report given to Kimberly (oncoming nurse) by Shirin Brown (offgoing nurse). Report included the following information SBAR, Kardex, Intake/Output and Recent Results. Bedside shift change report given to Shirin Brown (oncoming nurse) by Daynaa Feng (offgoing nurse). Report included the following information SBAR, Kardex, Intake/Output and Recent Results.

## 2020-11-25 NOTE — PROGRESS NOTES
TRANSFER - IN REPORT:    Verbal report received from PATRICK VENEGAS (name) on Timoteo Hernandez  being received from ED 15 (unit) for routine progression of care      Report consisted of patients Situation, Background, Assessment and   Recommendations(SBAR). Information from the following report(s) SBAR, Kardex, ED Summary, Intake/Output, MAR, Accordion and Recent Results was reviewed with the receiving nurse. Opportunity for questions and clarification was provided. Assessment completed upon patients arrival to unit and care assumed. Bedside and Verbal shift change report given to [facility] DANIELLE RN (oncoming nurse) by Kathy Gordon RN (offgoing nurse). Report included the following information SBAR, Kardex, Intake/Output, MAR, Accordion and Recent Results.

## 2020-11-25 NOTE — NURSE NAVIGATOR
Met briefly with patient at bedside. He was sitting up in the chair and had recently returned from transport off of the floor. He was slightly SOB at rest.  He agreed that it was not a good time to discuss HF education. Reviewed use of HF magnet for symptom recognition and suggested he review it later when he feels up to it. Living with HF Education book, HF magnet, and HF calendar left at bedside.

## 2020-11-25 NOTE — CONSULTS
Consult dict    ANNAMARIA due to cardiorenal syndrome/diuresis/contrast  CKD 3  Likely some intrinsic renal disease due to smoking/atherosclerosis  H/o renal mass  Ischemic CMP EF 20-25    Rec:  Hold ARB  Continue diuresis  Check renal US  Check urine lytes  May need inotropes  Will follow

## 2020-11-25 NOTE — PROGRESS NOTES
Cardiology Progress Note                              [address] [facility]. [address], [place], [address] [facility]                                 Phone [phone]; Fax [phone]        2020 8:14 AM     Admit Date:           2020  Admit Diagnosis:  CHF (congestive heart failure) ([facility] [address] [I50.9]  :          1960   MRN:          [id number]       Impression Plan/Recommendation   1. HFrEF  2. Atrial flutter  3. Ischemic CM  4. CAD s/p CABG & 2 BMS to RCA in  & BILLY to lCX   5. CKD/ANNAMARIA  6. Functional MR  7. Flat troponin elevation - likely d/t demand in the setting of acute HF  8. Tobacco use                 · Creatinine up to 2.37 from 1.7. good response to diuretics, but still very fluid overlaoded. Will continue IV diuretics today and monitor creatinine closely. If creatinine worsens will start inotrope. · Consult nephrology reviewed case w Dr Aram Robison appreciate recommendations   · D/c lovenox - start eliquis 5 mg BID- monitor renal function   · Start PO amiodarone load- d/c gtt. Check baseline TSH/ LFTs okay  · EF 20-25% continue GDMT coreg, but will temporarily hold losartan w/ ANNAMARIA  · Asa/statin- consider cardiac cath OP  · Reviewing records from [address] [place] [address] O 2018. Noted incidental finding on 3.8 cm left kidney mass highly suspicious of renal cell carcinoma - will discuss follow up imaging w radiology in the setting of ANNAMARIA       [facility] records     Cath 2013 3 V CAD, EF 45-50%  RCA: prox 30-40% instent restenosis of BMS. Distal BMS 30 instent restenosis     CABG at [facility] 2013    Cardiac cath 2018: severe 3V disease  Patent venous graft to PDA and LILMA graft to LAD  Successful PCI of prox Lcx into OM2 w BILLY    Echo 2018 LV 55% cLVH basal inferior and inferolateral wall hypokinesis. Mild RV dysfunction. No sig valve disease    Hx of lung nodule per CT 2013 5 MM JEREMY. 3 mm RLL.      Hx of pneumothorax w chest tube placement s/p fall    6/2018- 3.6 cm left renal mass highly suspicious of RCC            No intake/output data recorded. Last 3 Recorded Weights in this Encounter    11/23/20 1634 11/23/20 2138 11/24/20 0840   Weight: 191 lb (86.6 kg) 190 lb (86.2 kg) 190 lb (86.2 kg)         11/23 1901 - 11/25 0700  In: 754 [P.O.:600; I.V.:233]  Out: 800 [Urine:800]    SUBJECTIVE               Rober Speed denies palpitations, irregular heart beat, chest pain or   +LE edema.   Still unable to lay flat  + PRIDE        Current Facility-Administered Medications   Medication Dose Route Frequency    apixaban (ELIQUIS) tablet 5 mg  5 mg Oral BID    hydrALAZINE (APRESOLINE) 20 mg/mL injection 20 mg  20 mg IntraVENous Q6H PRN    aspirin chewable tablet 81 mg  81 mg Oral DAILY    atorvastatin (LIPITOR) tablet 80 mg  80 mg Oral DAILY    amiodarone (CORDARONE) 375 mg in dextrose 5% 250 mL infusion  0.5-1 mg/min IntraVENous TITRATE    losartan (COZAAR) tablet 50 mg  50 mg Oral DAILY    [Held by provider] furosemide (LASIX) injection 80 mg  80 mg IntraVENous BID    carvediloL (COREG) tablet 12.5 mg  12.5 mg Oral BID WITH MEALS    amiodarone (CORDARONE) tablet 400 mg  400 mg Oral TID    sodium chloride (NS) flush 5-40 mL  5-40 mL IntraVENous Q8H    sodium chloride (NS) flush 5-40 mL  5-40 mL IntraVENous PRN    acetaminophen (TYLENOL) tablet 650 mg  650 mg Oral Q6H PRN    Or    acetaminophen (TYLENOL) suppository 650 mg  650 mg Rectal Q6H PRN    polyethylene glycol (MIRALAX) packet 17 g  17 g Oral DAILY PRN    ondansetron (ZOFRAN) injection 4 mg  4 mg IntraVENous Q6H PRN      OBJECTIVE               Intake/Output Summary (Last 24 hours) at 11/25/2020 0814  Last data filed at 11/25/2020 0700  Gross per 24 hour   Intake 833 ml   Output 400 ml   Net 433 ml       Review of Systems - History obtained from the patient AS PER  HPI    Telemetry converted to NSR at 0230    PHYSICAL EXAM        Visit Vitals  /75 (BP 1 Location: Left arm, BP Patient Position: Sitting)   Pulse 70   Temp 97.5 °F (36.4 °C)   Resp 18   Ht 5' 9\" (1.753 m)   Wt 190 lb (86.2 kg)   SpO2 100%   BMI 28.06 kg/m²       Gen: Well-developed, in no acute distress  alert and oriented x 3  HEENT:  Pink conjunctivae, Hearing grossly normal.No scleral icterus or conjunctival, moist mucous membranes  Neck: Supple,+ JVD  Resp: + accessory muscle use, Clear breath sounds, No rales or rhonchi  Card: Regular Rate,Rythm,No murmurs, rubs or gallop. No thrills. GI:        Firm and distended. NT  MSK: No cyanosis or clubbing  Skin: No rashes or ulcers.  Tattoos to BUE  Neuro:  Cranial nerves are grossly intact, moving all four extremities, no focal deficit, follows commands appropriately  Psych:  Good insight, oriented to person, place and time, alert, Nml Affect  LE: +2 ble edema       DATA REVIEW            Laboratory and Imaging have been reviewed by me and are notable for  Recent Labs     11/24/20  1132 11/24/20  0350 11/23/20  1659   TROIQ 0.14* 0.18* 0.16*     Recent Labs     11/25/20  0358 11/24/20  0350 11/23/20  1659    139 137   K 4.1 4.4 4.3   CO2 26 25 23   BUN 44* 31* 31*   CREA 2.37* 1.76* 1.58*   * 90 103*   MG  --   --  2.2   WBC 11.1 10.9 10.5   HGB 14.0 14.0 13.6   HCT 42.4 41.7 40.6    268 200 Joann Brooks, NP

## 2020-11-26 LAB
ALBUMIN SERPL-MCNC: 3.4 G/DL (ref 3.5–5)
ALBUMIN/GLOB SERPL: 1.1 {RATIO} (ref 1.1–2.2)
ALP SERPL-CCNC: 78 U/L (ref 45–117)
ALT SERPL-CCNC: 103 U/L (ref 12–78)
ANION GAP SERPL CALC-SCNC: 10 MMOL/L (ref 5–15)
AST SERPL-CCNC: 80 U/L (ref 15–37)
BASOPHILS # BLD: 0 K/UL (ref 0–0.1)
BASOPHILS NFR BLD: 0 % (ref 0–1)
BILIRUB SERPL-MCNC: 0.9 MG/DL (ref 0.2–1)
BUN SERPL-MCNC: 67 MG/DL (ref 6–20)
BUN/CREAT SERPL: 21 (ref 12–20)
CALCIUM SERPL-MCNC: 8 MG/DL (ref 8.5–10.1)
CHLORIDE SERPL-SCNC: 105 MMOL/L (ref 97–108)
CO2 SERPL-SCNC: 17 MMOL/L (ref 21–32)
CREAT SERPL-MCNC: 3.26 MG/DL (ref 0.7–1.3)
DIFFERENTIAL METHOD BLD: ABNORMAL
EOSINOPHIL # BLD: 0.1 K/UL (ref 0–0.4)
EOSINOPHIL NFR BLD: 1 % (ref 0–7)
ERYTHROCYTE [DISTWIDTH] IN BLOOD BY AUTOMATED COUNT: 14.1 % (ref 11.5–14.5)
GLOBULIN SER CALC-MCNC: 3.2 G/DL (ref 2–4)
GLUCOSE SERPL-MCNC: 105 MG/DL (ref 65–100)
HCT VFR BLD AUTO: 42.2 % (ref 36.6–50.3)
HGB BLD-MCNC: 13.7 G/DL (ref 12.1–17)
IMM GRANULOCYTES # BLD AUTO: 0 K/UL (ref 0–0.04)
IMM GRANULOCYTES NFR BLD AUTO: 0 % (ref 0–0.5)
LYMPHOCYTES # BLD: 2.5 K/UL (ref 0.8–3.5)
LYMPHOCYTES NFR BLD: 23 % (ref 12–49)
MCH RBC QN AUTO: 32.3 PG (ref 26–34)
MCHC RBC AUTO-ENTMCNC: 32.5 G/DL (ref 30–36.5)
MCV RBC AUTO: 99.5 FL (ref 80–99)
MONOCYTES # BLD: 0.9 K/UL (ref 0–1)
MONOCYTES NFR BLD: 9 % (ref 5–13)
NEUTS SEG # BLD: 7.4 K/UL (ref 1.8–8)
NEUTS SEG NFR BLD: 67 % (ref 32–75)
NRBC # BLD: 0 K/UL (ref 0–0.01)
NRBC BLD-RTO: 0 PER 100 WBC
PLATELET # BLD AUTO: 249 K/UL (ref 150–400)
PMV BLD AUTO: 10.1 FL (ref 8.9–12.9)
POTASSIUM SERPL-SCNC: 4.2 MMOL/L (ref 3.5–5.1)
PROT SERPL-MCNC: 6.6 G/DL (ref 6.4–8.2)
RBC # BLD AUTO: 4.24 M/UL (ref 4.1–5.7)
SODIUM SERPL-SCNC: 132 MMOL/L (ref 136–145)
WBC # BLD AUTO: 11 K/UL (ref 4.1–11.1)

## 2020-11-26 PROCEDURE — 36415 COLL VENOUS BLD VENIPUNCTURE: CPT

## 2020-11-26 PROCEDURE — 94760 N-INVAS EAR/PLS OXIMETRY 1: CPT

## 2020-11-26 PROCEDURE — 74011250637 HC RX REV CODE- 250/637: Performed by: HOSPITALIST

## 2020-11-26 PROCEDURE — 74011250637 HC RX REV CODE- 250/637: Performed by: NURSE PRACTITIONER

## 2020-11-26 PROCEDURE — 65660000000 HC RM CCU STEPDOWN

## 2020-11-26 PROCEDURE — 80053 COMPREHEN METABOLIC PANEL: CPT

## 2020-11-26 PROCEDURE — 74011250636 HC RX REV CODE- 250/636: Performed by: NURSE PRACTITIONER

## 2020-11-26 PROCEDURE — 74011250637 HC RX REV CODE- 250/637: Performed by: STUDENT IN AN ORGANIZED HEALTH CARE EDUCATION/TRAINING PROGRAM

## 2020-11-26 PROCEDURE — 99233 SBSQ HOSP IP/OBS HIGH 50: CPT | Performed by: NURSE PRACTITIONER

## 2020-11-26 PROCEDURE — 85025 COMPLETE CBC W/AUTO DIFF WBC: CPT

## 2020-11-26 RX ORDER — LANOLIN ALCOHOL/MO/W.PET/CERES
3 CREAM (GRAM) TOPICAL
Status: DISCONTINUED | OUTPATIENT
Start: 2020-11-26 | End: 2020-11-30 | Stop reason: HOSPADM

## 2020-11-26 RX ORDER — AMIODARONE HYDROCHLORIDE 200 MG/1
400 TABLET ORAL DAILY
Status: DISCONTINUED | OUTPATIENT
Start: 2020-11-26 | End: 2020-11-26

## 2020-11-26 RX ORDER — MILRINONE LACTATE 0.2 MG/ML
0.15 INJECTION, SOLUTION INTRAVENOUS CONTINUOUS
Status: DISPENSED | OUTPATIENT
Start: 2020-11-26 | End: 2020-11-29

## 2020-11-26 RX ORDER — AMIODARONE HYDROCHLORIDE 200 MG/1
400 TABLET ORAL DAILY
Status: DISCONTINUED | OUTPATIENT
Start: 2020-11-27 | End: 2020-11-30 | Stop reason: HOSPADM

## 2020-11-26 RX ORDER — ZOLPIDEM TARTRATE 5 MG/1
5 TABLET ORAL ONCE
Status: COMPLETED | OUTPATIENT
Start: 2020-11-26 | End: 2020-11-26

## 2020-11-26 RX ADMIN — Medication 10 ML: at 05:37

## 2020-11-26 RX ADMIN — Medication 10 ML: at 22:00

## 2020-11-26 RX ADMIN — APIXABAN 5 MG: 5 TABLET, FILM COATED ORAL at 17:02

## 2020-11-26 RX ADMIN — MELATONIN TAB 3 MG 3 MG: 3 TAB at 22:03

## 2020-11-26 RX ADMIN — ASPIRIN 81 MG: 81 TABLET, CHEWABLE ORAL at 09:53

## 2020-11-26 RX ADMIN — APIXABAN 5 MG: 5 TABLET, FILM COATED ORAL at 09:53

## 2020-11-26 RX ADMIN — ZOLPIDEM TARTRATE 5 MG: 5 TABLET ORAL at 02:59

## 2020-11-26 RX ADMIN — CARVEDILOL 12.5 MG: 12.5 TABLET, FILM COATED ORAL at 09:52

## 2020-11-26 RX ADMIN — AMIODARONE HYDROCHLORIDE 400 MG: 200 TABLET ORAL at 09:53

## 2020-11-26 RX ADMIN — ATORVASTATIN CALCIUM 80 MG: 20 TABLET, FILM COATED ORAL at 09:52

## 2020-11-26 RX ADMIN — MILRINONE LACTATE IN DEXTROSE 0.12 MCG/KG/MIN: 200 INJECTION, SOLUTION INTRAVENOUS at 09:52

## 2020-11-26 NOTE — PROGRESS NOTES
Bedside shift change report given to Kimberly (oncoming nurse) by [facility] (offgoing nurse). Report included the following information SBAR, Kardex, Intake/Output and Recent Results. f    Bedside shift change report given to [employer] (oncoming nurse) by Mitch Mcgraw (offgoing nurse). Report included the following information SBAR, Kardex, Intake/Output and Recent Results.

## 2020-11-26 NOTE — PROGRESS NOTES
1900: Bedside and Verbal shift change report given to Unique Alvarez (oncoming nurse) by Maria T Mancilla (offgoing nurse). Report included the following information SBAR, Kardex, Intake/Output, MAR and Recent Results. 0041: Notified Dr. Yissel King about pt unable to lie down to sleep, pt stating he could not breathe when laying flat. Osvaldo King to ask if a sleeping aid or something for anxiety would be beneficial for this pt. Awaiting response. 0230: Pt threatening to leave AMA due to lack of sleep. Pt pacing around the room saying \"there is no point of being here if you can't do anything for me\". Educated patient on why he should stay so we can continue to diurese and monitor HR. Pt verbalizing he would rather be home at this time where he can be more comfortable. 2346: Still awaiting response from MD. Attempted to follow up about getting order for sleeping aid but MD did not  the phone. Will try again later. 1515: PerfectServed Dr. Yissel King about pt threatening to leave AMA if he does not get any sleep.     0715: Bedside and Verbal shift change report given to Noemi Del Rio (oncoming nurse) by Pascale Nicole (offgoing nurse). Report included the following information SBAR, Kardex, Intake/Output, MAR and Recent Results.

## 2020-11-26 NOTE — PROGRESS NOTES
Cardiology Progress Note                              [address] [facility] [address] [facility] [address], [place], [address] [facility]                                 Phone [phone]; Fax [phone]        2020 8:14 AM     Admit Date:           2020  Admit Diagnosis:  CHF (congestive heart failure) ([facility] [address] [I50.9]  :          1960   MRN:          [id number]       Impression Plan/Recommendation   1. HFrEF  2. Atrial flutter  3. Ischemic CM  4. CAD s/p CABG & 2 BMS to RCA in  & BILLY to lCX   5. CKD/ANNAMARIA  6. Functional MR  7. Flat troponin elevation - likely d/t demand in the setting of acute HF  8. Tobacco use  9. Renal mass suspicious of renal cell CA  10. LFTs elevated  11. NSVT                 · Creatinine worsening- start milrinone. Hold  Diuretics for today, can resume tomorrow   · Nephrology following appreciate recommendations   · cont eliquis 5 mg BID  · Amiodarone 400 mg tid  - noted LFTs up suspect this is d/t congestion, will decrease amiodarone. Remains in NSR    · EF 20-25% continue GDMT coreg, but will temporarily hold losartan w/ ANNAMARIA  · Asa/statin- R/L cardiac cath OP  · Urology consulted for renal mass- rec OP MRI in 3 months         Reviewed above plan with the patient    [facility] records     Cath 2013 3 V CAD, EF 45-50%  RCA: prox 30-40% instent restenosis of BMS. Distal BMS 30 instent restenosis     CABG at [address] [place] [address] 2013    Cardiac cath 2018: severe 3V disease  Patent venous graft to PDA and LILMA graft to LAD  Successful PCI of prox Lcx into OM2 w BILLY    Echo 2018 LV 55% cLVH basal inferior and inferolateral wall hypokinesis. Mild RV dysfunction. No sig valve disease    Hx of lung nodule per CT 2013 5 MM JEREMY. 3 mm RLL. Hx of pneumothorax w chest tube placement s/p fall    2018- 3.6 cm left renal mass highly suspicious of RCC            No intake/output data recorded.     Last 3 Recorded Weights in this Encounter    11/23/20 2138 11/24/20 0840 11/26/20 0646   Weight: 190 lb (86.2 kg) 190 lb (86.2 kg) 185 lb (83.9 kg)         11/24 1901 - 11/26 0700  In: 233 [I.V.:233]  Out: -     SUBJECTIVE               Lyubov Billy denies palpitations, irregular heart beat, chest pain    +LE edema.   Got some sleep last night   + PRIDE        Current Facility-Administered Medications   Medication Dose Route Frequency    milrinone (PRIMACOR) 20 MG/100 ML D5W infusion  0.2 mcg/kg/min IntraVENous CONTINUOUS    apixaban (ELIQUIS) tablet 5 mg  5 mg Oral BID    hydrALAZINE (APRESOLINE) 20 mg/mL injection 20 mg  20 mg IntraVENous Q6H PRN    aspirin chewable tablet 81 mg  81 mg Oral DAILY    atorvastatin (LIPITOR) tablet 80 mg  80 mg Oral DAILY    [Held by provider] losartan (COZAAR) tablet 50 mg  50 mg Oral DAILY    [Held by provider] furosemide (LASIX) injection 80 mg  80 mg IntraVENous BID    carvediloL (COREG) tablet 12.5 mg  12.5 mg Oral BID WITH MEALS    amiodarone (CORDARONE) tablet 400 mg  400 mg Oral TID    sodium chloride (NS) flush 5-40 mL  5-40 mL IntraVENous Q8H    sodium chloride (NS) flush 5-40 mL  5-40 mL IntraVENous PRN    acetaminophen (TYLENOL) tablet 650 mg  650 mg Oral Q6H PRN    Or    acetaminophen (TYLENOL) suppository 650 mg  650 mg Rectal Q6H PRN    polyethylene glycol (MIRALAX) packet 17 g  17 g Oral DAILY PRN    ondansetron (ZOFRAN) injection 4 mg  4 mg IntraVENous Q6H PRN      OBJECTIVE             No intake or output data in the 24 hours ending 11/26/20 0805    Review of Systems - History obtained from the patient AS PER  HPI    Telemetry NSR- SB 5 bts of NSVT, PVC    PHYSICAL EXAM        Visit Vitals  /84 (BP 1 Location: Left arm, BP Patient Position: Sitting)   Pulse 60   Temp 97.4 °F (36.3 °C)   Resp 18   Ht 5' 9\" (1.753 m)   Wt 185 lb (83.9 kg)   SpO2 99%   BMI 27.32 kg/m²       Gen: Well-developed, in no acute distress  alert and oriented x 3  HEENT:  Pink conjunctivae, Hearing grossly normal.No scleral icterus or conjunctival, moist mucous membranes  Neck: Supple,+ JVD  Resp: + accessory muscle use, Clear breath sounds, No rales or rhonchi  Card: Regular Rate,Rythm, No murmurs, rubs or gallop. No thrills. GI:        Firm and distended. NT  MSK: No cyanosis or clubbing  Skin: No rashes or ulcers.  Tattoos to BUE  Neuro:  Cranial nerves are grossly intact, moving all four extremities, no focal deficit, follows commands appropriately  Psych:  Good insight, oriented to person, place and time, alert, Nml Affect  LE: +2 ble edema       DATA REVIEW            Laboratory and Imaging have been reviewed by me and are notable for  Recent Labs     11/24/20  1132 11/24/20  0350 11/23/20  1659   TROIQ 0.14* 0.18* 0.16*     Recent Labs     11/26/20  0449 11/25/20  0358 11/24/20  0350 11/23/20  1659   * 140 139 137   K 4.2 4.1 4.4 4.3   CO2 17* 26 25 23   BUN 67* 44* 31* 31*   CREA 3.26* 2.37* 1.76* 1.58*   * 120* 90 103*   MG  --   --   --  2.2   WBC 11.0 11.1 10.9 10.5   HGB 13.7 14.0 14.0 13.6   HCT 42.2 42.4 41.7 40.6    298 268 200 Joann Brooks, NP

## 2020-11-26 NOTE — PROGRESS NOTES
[address]  YOB: 1960          Assessment & Plan:   ANNAMARIA, worse  Cardiorenal syndrome  Probably CKD 3 with some intrinsic atherosclerotic renal disease  CHF  CMP EF 20-25  CAD s/p CABG    Rec:  Diuresis and ARB held  Agree with milrinone  Watch renal function closely  No acute indication HD       Subjective:   CC: f/u ANNAMARIA  HPI: Creat rising rapidly. To start milrinone today  ROS: +sob, some difficulty sleeping  Current Facility-Administered Medications   Medication Dose Route Frequency    milrinone (PRIMACOR) 20 MG/100 ML D5W infusion  0.125 mcg/kg/min IntraVENous CONTINUOUS    apixaban (ELIQUIS) tablet 5 mg  5 mg Oral BID    hydrALAZINE (APRESOLINE) 20 mg/mL injection 20 mg  20 mg IntraVENous Q6H PRN    aspirin chewable tablet 81 mg  81 mg Oral DAILY    atorvastatin (LIPITOR) tablet 80 mg  80 mg Oral DAILY    [Held by provider] losartan (COZAAR) tablet 50 mg  50 mg Oral DAILY    [Held by provider] furosemide (LASIX) injection 80 mg  80 mg IntraVENous BID    carvediloL (COREG) tablet 12.5 mg  12.5 mg Oral BID WITH MEALS    amiodarone (CORDARONE) tablet 400 mg  400 mg Oral TID    sodium chloride (NS) flush 5-40 mL  5-40 mL IntraVENous Q8H    sodium chloride (NS) flush 5-40 mL  5-40 mL IntraVENous PRN    acetaminophen (TYLENOL) tablet 650 mg  650 mg Oral Q6H PRN    Or    acetaminophen (TYLENOL) suppository 650 mg  650 mg Rectal Q6H PRN    polyethylene glycol (MIRALAX) packet 17 g  17 g Oral DAILY PRN    ondansetron (ZOFRAN) injection 4 mg  4 mg IntraVENous Q6H PRN          Objective:     Vitals:  Blood pressure 122/84, pulse 60, temperature 97.4 °F (36.3 °C), resp. rate 18, height 5' 9\" (1.753 m), weight 83.9 kg (185 lb), SpO2 99 %. Temp (24hrs), Av.3 °F (36.3 °C), Min:96.5 °F (35.8 °C), Max:97.7 °F (36.5 °C)      Intake and Output:  No intake/output data recorded.    1901 -  0700  In: 233 [I.V.:233]  Out: -     Physical Exam:               GENERAL ASSESSMENT: NAD  CHEST: dec bs at bases, dyspneic  HEART: S1S2  ABDOMEN: Soft,NT  EXTREMITY: trace EDEMA  NEURO: Grossly non focal          ECG/rhythm:    Data Review      No results for input(s): TNIPOC in the last 72 hours. No lab exists for component: ITNL   Recent Labs     11/24/20  1132 11/24/20  0350 11/23/20  1659   TROIQ 0.14* 0.18* 0.16*     Recent Labs     11/26/20  0449 11/25/20  0358 11/24/20  0350 11/23/20  1659   * 140 139 137   K 4.2 4.1 4.4 4.3    106 107 109*   CO2 17* 26 25 23   BUN 67* 44* 31* 31*   CREA 3.26* 2.37* 1.76* 1.58*   * 120* 90 103*   MG  --   --   --  2.2   CA 8.0* 8.2* 8.6 8.4*   ALB 3.4* 3.7 3.8 3.4*   WBC 11.0 11.1 10.9 10.5   HGB 13.7 14.0 14.0 13.6   HCT 42.2 42.4 41.7 40.6    298 268 254      Recent Labs     11/23/20  1659   INR 1.1   PTP 11.4*     Needs: urine analysis, urine sodium, protein and creatinine  Lab Results   Component Value Date/Time    Creatinine, urine 102.58 05/20/2013 07:36 PM           : Ruba Gómez MD  11/26/2020        [facility]:  [email or web address]  Maria Reed office:  [address] [place] [address], [address] [place] [address] [address]  [place], [address] [facility]  Phone: [phone]  Fax :     [phone]    [place] office:  [address] [facility], [address]  Phone - [phone]  Fax - [phone]

## 2020-11-27 LAB
ALBUMIN SERPL-MCNC: 3.3 G/DL (ref 3.5–5)
ALBUMIN/GLOB SERPL: 1.1 {RATIO} (ref 1.1–2.2)
ALP SERPL-CCNC: 78 U/L (ref 45–117)
ALT SERPL-CCNC: 86 U/L (ref 12–78)
ANION GAP SERPL CALC-SCNC: 9 MMOL/L (ref 5–15)
AST SERPL-CCNC: 50 U/L (ref 15–37)
BASOPHILS # BLD: 0 K/UL (ref 0–0.1)
BASOPHILS NFR BLD: 0 % (ref 0–1)
BILIRUB SERPL-MCNC: 0.6 MG/DL (ref 0.2–1)
BUN SERPL-MCNC: 76 MG/DL (ref 6–20)
BUN/CREAT SERPL: 25 (ref 12–20)
CALCIUM SERPL-MCNC: 8.2 MG/DL (ref 8.5–10.1)
CHLORIDE SERPL-SCNC: 105 MMOL/L (ref 97–108)
CO2 SERPL-SCNC: 23 MMOL/L (ref 21–32)
CREAT SERPL-MCNC: 3.06 MG/DL (ref 0.7–1.3)
DIFFERENTIAL METHOD BLD: ABNORMAL
EOSINOPHIL # BLD: 0.2 K/UL (ref 0–0.4)
EOSINOPHIL NFR BLD: 2 % (ref 0–7)
ERYTHROCYTE [DISTWIDTH] IN BLOOD BY AUTOMATED COUNT: 13.6 % (ref 11.5–14.5)
GLOBULIN SER CALC-MCNC: 3.1 G/DL (ref 2–4)
GLUCOSE SERPL-MCNC: 110 MG/DL (ref 65–100)
HCT VFR BLD AUTO: 39 % (ref 36.6–50.3)
HGB BLD-MCNC: 13.2 G/DL (ref 12.1–17)
IMM GRANULOCYTES # BLD AUTO: 0.1 K/UL (ref 0–0.04)
IMM GRANULOCYTES NFR BLD AUTO: 0 % (ref 0–0.5)
LYMPHOCYTES # BLD: 2.8 K/UL (ref 0.8–3.5)
LYMPHOCYTES NFR BLD: 25 % (ref 12–49)
MCH RBC QN AUTO: 32 PG (ref 26–34)
MCHC RBC AUTO-ENTMCNC: 33.8 G/DL (ref 30–36.5)
MCV RBC AUTO: 94.7 FL (ref 80–99)
MONOCYTES # BLD: 1.2 K/UL (ref 0–1)
MONOCYTES NFR BLD: 11 % (ref 5–13)
NEUTS SEG # BLD: 7 K/UL (ref 1.8–8)
NEUTS SEG NFR BLD: 62 % (ref 32–75)
NRBC # BLD: 0 K/UL (ref 0–0.01)
NRBC BLD-RTO: 0 PER 100 WBC
PLATELET # BLD AUTO: 262 K/UL (ref 150–400)
PMV BLD AUTO: 10.2 FL (ref 8.9–12.9)
POTASSIUM SERPL-SCNC: 3.7 MMOL/L (ref 3.5–5.1)
PROT SERPL-MCNC: 6.4 G/DL (ref 6.4–8.2)
RBC # BLD AUTO: 4.12 M/UL (ref 4.1–5.7)
SODIUM SERPL-SCNC: 137 MMOL/L (ref 136–145)
WBC # BLD AUTO: 11.3 K/UL (ref 4.1–11.1)

## 2020-11-27 PROCEDURE — 36415 COLL VENOUS BLD VENIPUNCTURE: CPT

## 2020-11-27 PROCEDURE — 65660000000 HC RM CCU STEPDOWN

## 2020-11-27 PROCEDURE — 85025 COMPLETE CBC W/AUTO DIFF WBC: CPT

## 2020-11-27 PROCEDURE — 74011250637 HC RX REV CODE- 250/637: Performed by: NURSE PRACTITIONER

## 2020-11-27 PROCEDURE — 74011250637 HC RX REV CODE- 250/637: Performed by: STUDENT IN AN ORGANIZED HEALTH CARE EDUCATION/TRAINING PROGRAM

## 2020-11-27 PROCEDURE — 99233 SBSQ HOSP IP/OBS HIGH 50: CPT | Performed by: NURSE PRACTITIONER

## 2020-11-27 PROCEDURE — 80053 COMPREHEN METABOLIC PANEL: CPT

## 2020-11-27 PROCEDURE — 74011250637 HC RX REV CODE- 250/637: Performed by: HOSPITALIST

## 2020-11-27 PROCEDURE — 74011250636 HC RX REV CODE- 250/636: Performed by: STUDENT IN AN ORGANIZED HEALTH CARE EDUCATION/TRAINING PROGRAM

## 2020-11-27 PROCEDURE — 74011250636 HC RX REV CODE- 250/636: Performed by: NURSE PRACTITIONER

## 2020-11-27 RX ADMIN — Medication 10 ML: at 06:00

## 2020-11-27 RX ADMIN — FUROSEMIDE 80 MG: 10 INJECTION, SOLUTION INTRAMUSCULAR; INTRAVENOUS at 08:34

## 2020-11-27 RX ADMIN — Medication 10 ML: at 18:58

## 2020-11-27 RX ADMIN — MILRINONE LACTATE IN DEXTROSE 0.15 MCG/KG/MIN: 200 INJECTION, SOLUTION INTRAVENOUS at 19:07

## 2020-11-27 RX ADMIN — ATORVASTATIN CALCIUM 80 MG: 20 TABLET, FILM COATED ORAL at 08:33

## 2020-11-27 RX ADMIN — APIXABAN 5 MG: 5 TABLET, FILM COATED ORAL at 08:33

## 2020-11-27 RX ADMIN — CARVEDILOL 12.5 MG: 12.5 TABLET, FILM COATED ORAL at 18:58

## 2020-11-27 RX ADMIN — FUROSEMIDE 80 MG: 10 INJECTION, SOLUTION INTRAMUSCULAR; INTRAVENOUS at 18:57

## 2020-11-27 RX ADMIN — ASPIRIN 81 MG: 81 TABLET, CHEWABLE ORAL at 08:33

## 2020-11-27 RX ADMIN — CARVEDILOL 12.5 MG: 12.5 TABLET, FILM COATED ORAL at 08:32

## 2020-11-27 RX ADMIN — AMIODARONE HYDROCHLORIDE 400 MG: 200 TABLET ORAL at 08:33

## 2020-11-27 RX ADMIN — APIXABAN 5 MG: 5 TABLET, FILM COATED ORAL at 18:58

## 2020-11-27 NOTE — PROGRESS NOTES
[address]  YOB: 1960          Assessment & Plan:   ANNAMARIA, better today  Cardiorenal syndrome  Probably CKD 3 with some intrinsic atherosclerotic renal disease  CHF  CMP EF 20-25  CAD s/p CABG    Rec:  Renal function better with milrinone  Agree with resuming diuresis  Watch renal function closely  No acute indication HD       Subjective:   CC: f/u ANNAMARIA  HPI: Creat improving after milrinone  ROS: Feels better, less sob, no n/v  Current Facility-Administered Medications   Medication Dose Route Frequency    milrinone (PRIMACOR) 20 MG/100 ML D5W infusion  0.15 mcg/kg/min IntraVENous CONTINUOUS    amiodarone (CORDARONE) tablet 400 mg  400 mg Oral DAILY    melatonin tablet 3 mg  3 mg Oral QHS PRN    apixaban (ELIQUIS) tablet 5 mg  5 mg Oral BID    hydrALAZINE (APRESOLINE) 20 mg/mL injection 20 mg  20 mg IntraVENous Q6H PRN    aspirin chewable tablet 81 mg  81 mg Oral DAILY    atorvastatin (LIPITOR) tablet 80 mg  80 mg Oral DAILY    [Held by provider] losartan (COZAAR) tablet 50 mg  50 mg Oral DAILY    furosemide (LASIX) injection 80 mg  80 mg IntraVENous BID    carvediloL (COREG) tablet 12.5 mg  12.5 mg Oral BID WITH MEALS    sodium chloride (NS) flush 5-40 mL  5-40 mL IntraVENous Q8H    sodium chloride (NS) flush 5-40 mL  5-40 mL IntraVENous PRN    acetaminophen (TYLENOL) tablet 650 mg  650 mg Oral Q6H PRN    Or    acetaminophen (TYLENOL) suppository 650 mg  650 mg Rectal Q6H PRN    polyethylene glycol (MIRALAX) packet 17 g  17 g Oral DAILY PRN    ondansetron (ZOFRAN) injection 4 mg  4 mg IntraVENous Q6H PRN          Objective:     Vitals:  Blood pressure 132/86, pulse 62, temperature 97.3 °F (36.3 °C), resp. rate 18, height 5' 9\" (1.753 m), weight 84.9 kg (187 lb 3.2 oz), SpO2 98 %.   Temp (24hrs), Av.4 °F (36.3 °C), Min:97.1 °F (36.2 °C), Max:97.6 °F (36.4 °C)      Intake and Output:  No intake/output data recorded. 11/25 1901 - 11/27 0700  In: 100 [P.O.:100]  Out: -     Physical Exam:               GENERAL ASSESSMENT: NAD  CHEST: Clear, no distress  HEART: S1S2  ABDOMEN: Soft,NT  EXTREMITY: trace EDEMA  NEURO: Grossly non focal          ECG/rhythm:    Data Review      No results for input(s): TNIPOC in the last 72 hours. No lab exists for component: ITNL   Recent Labs     11/24/20  1132   TROIQ 0.14*     Recent Labs     11/27/20  0317 11/26/20  0449 11/25/20  0358    132* 140   K 3.7 4.2 4.1    105 106   CO2 23 17* 26   BUN 76* 67* 44*   CREA 3.06* 3.26* 2.37*   * 105* 120*   CA 8.2* 8.0* 8.2*   ALB 3.3* 3.4* 3.7   WBC 11.3* 11.0 11.1   HGB 13.2 13.7 14.0   HCT 39.0 42.2 42.4    249 298      No results for input(s): INR, PTP, APTT, INREXT, INREXT in the last 72 hours. Needs: urine analysis, urine sodium, protein and creatinine  Lab Results   Component Value Date/Time    Creatinine, urine 102.58 05/20/2013 07:36 PM           : Nahomy Lentz MD  11/27/2020        [facility]:  [email or web address]  Susana Chisholm office:  [address] [place] [address], [address] [place] [address] [address]  [address]  Phone: [phone]  Fax :     [phone]    [place] office:  [address] [facility], [address] [place] [address]  Phone - [phone]  Fax - [phone]

## 2020-11-27 NOTE — PROGRESS NOTES
Comprehensive Nutrition Assessment    Type and Reason for Visit: Initial, RD nutrition re-screen/LOS    Nutrition Recommendations/Plan:   1. Continue with Cardiac diet order. Nutrition Assessment:      11/27: 65 yo male admitted for CHF. PMhx: CAD, MI, HTN. Overweight per BMI of 27. Weight hx in EMR indicates weight gain PTA but no recent weights. Pt denies any weight loss PTA. Reports good appetite at home and eating well since admission. Cardiac diet ordered - intakes reported as % all meals. Labs: BUN 76, Cr 3.06. Med: statin, lasix. Malnutrition Assessment:  Does not meet criteria at this time. Estimated Daily Nutrient Needs:  Energy (kcal): 2156(REE 1659 x AF 1.3); Weight Used for Energy Requirements: Current  Protein (g): 68-86(0.8-1gm/kg/d); Weight Used for Protein Requirements: Current  Fluid (ml/day): 2156; Method Used for Fluid Requirements: 1 ml/kcal      Nutrition Related Findings:  LBM 11/24      Wounds:    None       Current Nutrition Therapies:  DIET CARDIAC Regular    Anthropometric Measures:  · Height:  5' 9\" (175.3 cm)  · Current Body Wt:  84.9 kg (187 lb 2.7 oz)   · Admission Body Wt:       · Usual Body Wt:        · Ideal Body Wt:   :      · Adjusted Body Weight:   ; Weight Adjustment for: No adjustment   · Adjusted BMI:       · BMI Category: Overweight (BMI 25.0-29. 9)       Nutrition Diagnosis:   · No nutrition diagnosis at this time related to   as evidenced by        Nutrition Interventions:   Food and/or Nutrient Delivery: Continue current diet  Nutrition Education and Counseling: No recommendations at this time  Coordination of Nutrition Care: Continue to monitor while inpatient    Goals:  PO intakes > 75% all meals within next 5-7 days       Nutrition Monitoring and Evaluation:   Behavioral-Environmental Outcomes:    Food/Nutrient Intake Outcomes: Food and nutrient intake  Physical Signs/Symptoms Outcomes: Biochemical data, GI status, Weight    Discharge Planning: Continue current diet     Electronically signed by Lis Elizondo, [address] on 11/27/2020     Contact: [phone]

## 2020-11-27 NOTE — PROGRESS NOTES
Cardiology Progress Note                              [address], [address], [id number] [place] [address]                                 Phone [phone]; Fax [phone]        2020 8:14 AM     Admit Date:           2020  Admit Diagnosis:  CHF (congestive heart failure) ([facility] [address] [I50.9]  :          1960   MRN:          [id number]       Impression Plan/Recommendation   1. HFrEF  2. Atrial flutter  3. Ischemic CM  4. CAD s/p CABG & 2 BMS to RCA in  & BILLY to lCX   5. CKD/ANNAMARIA  6. Functional MR  7. Flat troponin elevation - likely d/t demand in the setting of acute HF  8. Tobacco use  9. Renal mass suspicious of renal cell CA  10. LFTs elevated  11. NSVT                 · S/p day 1# of milrinone gtt, diuretics held yesterday- will resume this AM. Overall he is feeling better. · Nephrology following appreciate recommendations   · cont eliquis 5 mg BID  · Remains in NSR  Amiodarone decreased to 400 mg daily - lfts better   · EF 20-25% continue GDMT coreg, but will temporarily hold losartan w/ ANNAMARIA  · Asa/statin- R/L cardiac cath OP  · Urology consulted for renal mass- rec OP MRI in 3 months         Reviewed above plan with the patient   Hopefully home  or Monday    [facility] records     Cath 2013 3 V CAD, EF 45-50%  RCA: prox 30-40% instent restenosis of BMS. Distal BMS 30 instent restenosis     CABG at [facility] 2013    Cardiac cath 2018: severe 3V disease  Patent venous graft to PDA and LILMA graft to LAD  Successful PCI of prox Lcx into OM2 w BILLY    Echo 2018 LV 55% cLVH basal inferior and inferolateral wall hypokinesis. Mild RV dysfunction. No sig valve disease    Hx of lung nodule per CT 2013 5 MM JEREMY. 3 mm RLL. Hx of pneumothorax w chest tube placement s/p fall    2018- 3.6 cm left renal mass highly suspicious of RCC            No intake/output data recorded.     Last 3 Recorded Weights in this Encounter    11/24/20 0840 11/26/20 0646 11/27/20 0505   Weight: 190 lb (86.2 kg) 185 lb (83.9 kg) 187 lb 3.2 oz (84.9 kg)         11/25 1901 - 11/27 0700  In: 100 [P.O.:100]  Out: -     SUBJECTIVE               Olga Tolliver denies palpitations, irregular heart beat, chest pain.  Feels a little better today  +LE edema.  + PRIDE        Current Facility-Administered Medications   Medication Dose Route Frequency    milrinone (PRIMACOR) 20 MG/100 ML D5W infusion  0.125 mcg/kg/min IntraVENous CONTINUOUS    amiodarone (CORDARONE) tablet 400 mg  400 mg Oral DAILY    melatonin tablet 3 mg  3 mg Oral QHS PRN    apixaban (ELIQUIS) tablet 5 mg  5 mg Oral BID    hydrALAZINE (APRESOLINE) 20 mg/mL injection 20 mg  20 mg IntraVENous Q6H PRN    aspirin chewable tablet 81 mg  81 mg Oral DAILY    atorvastatin (LIPITOR) tablet 80 mg  80 mg Oral DAILY    [Held by provider] losartan (COZAAR) tablet 50 mg  50 mg Oral DAILY    furosemide (LASIX) injection 80 mg  80 mg IntraVENous BID    carvediloL (COREG) tablet 12.5 mg  12.5 mg Oral BID WITH MEALS    sodium chloride (NS) flush 5-40 mL  5-40 mL IntraVENous Q8H    sodium chloride (NS) flush 5-40 mL  5-40 mL IntraVENous PRN    acetaminophen (TYLENOL) tablet 650 mg  650 mg Oral Q6H PRN    Or    acetaminophen (TYLENOL) suppository 650 mg  650 mg Rectal Q6H PRN    polyethylene glycol (MIRALAX) packet 17 g  17 g Oral DAILY PRN    ondansetron (ZOFRAN) injection 4 mg  4 mg IntraVENous Q6H PRN      OBJECTIVE               Intake/Output Summary (Last 24 hours) at 11/27/2020 0947  Last data filed at 11/26/2020 2300  Gross per 24 hour   Intake 100 ml   Output    Net 100 ml       Review of Systems - History obtained from the patient AS PER  HPI    Telemetry NSR- SB     PHYSICAL EXAM        Visit Vitals  /86 (BP 1 Location: Right arm, BP Patient Position: At rest)   Pulse 62   Temp 97.3 °F (36.3 °C)   Resp 18   Ht 5' 9\" (1.753 m)   Wt 187 lb 3.2 oz (84.9 kg)   SpO2 98%   BMI 27.64 kg/m²       Gen: Well-developed, in no acute distress  alert and oriented x 3  HEENT:  Pink conjunctivae, Hearing grossly normal.No scleral icterus or conjunctival, moist mucous membranes  Neck: Supple,+ JVD  Resp: no accessory muscle use, Clear breath sounds, No rales or rhonchi  Card: Regular Rate,Rythm, No murmurs, rubs or gallop. No thrills. GI:        Mildly Firm and distended. NT  MSK: No cyanosis or clubbing  Skin: No rashes or ulcers.  Tattoos to BUE  Neuro:  Cranial nerves are grossly intact, moving all four extremities, no focal deficit, follows commands appropriately  Psych:  Good insight, oriented to person, place and time, alert, Nml Affect  LE: +2 ble edema       DATA REVIEW            Laboratory and Imaging have been reviewed by me and are notable for  Recent Labs     11/24/20  1132   TROIQ 0.14*     Recent Labs     11/27/20  0317 11/26/20  0449 11/25/20  0358    132* 140   K 3.7 4.2 4.1   CO2 23 17* 26   BUN 76* 67* 44*   CREA 3.06* 3.26* 2.37*   * 105* 120*   WBC 11.3* 11.0 11.1   HGB 13.2 13.7 14.0   HCT 39.0 42.2 42.4    [id number] [facility],

## 2020-11-28 LAB
ANION GAP SERPL CALC-SCNC: 6 MMOL/L (ref 5–15)
BUN SERPL-MCNC: 64 MG/DL (ref 6–20)
BUN/CREAT SERPL: 26 (ref 12–20)
CALCIUM SERPL-MCNC: 8.1 MG/DL (ref 8.5–10.1)
CHLORIDE SERPL-SCNC: 105 MMOL/L (ref 97–108)
CO2 SERPL-SCNC: 27 MMOL/L (ref 21–32)
CREAT SERPL-MCNC: 2.46 MG/DL (ref 0.7–1.3)
ERYTHROCYTE [DISTWIDTH] IN BLOOD BY AUTOMATED COUNT: 13.7 % (ref 11.5–14.5)
GLUCOSE SERPL-MCNC: 114 MG/DL (ref 65–100)
HCT VFR BLD AUTO: 38.9 % (ref 36.6–50.3)
HGB BLD-MCNC: 13 G/DL (ref 12.1–17)
MCH RBC QN AUTO: 31.5 PG (ref 26–34)
MCHC RBC AUTO-ENTMCNC: 33.4 G/DL (ref 30–36.5)
MCV RBC AUTO: 94.2 FL (ref 80–99)
NRBC # BLD: 0 K/UL (ref 0–0.01)
NRBC BLD-RTO: 0 PER 100 WBC
PLATELET # BLD AUTO: 280 K/UL (ref 150–400)
PMV BLD AUTO: 9.5 FL (ref 8.9–12.9)
POTASSIUM SERPL-SCNC: 3.6 MMOL/L (ref 3.5–5.1)
RBC # BLD AUTO: 4.13 M/UL (ref 4.1–5.7)
SODIUM SERPL-SCNC: 138 MMOL/L (ref 136–145)
WBC # BLD AUTO: 10.6 K/UL (ref 4.1–11.1)

## 2020-11-28 PROCEDURE — 94760 N-INVAS EAR/PLS OXIMETRY 1: CPT

## 2020-11-28 PROCEDURE — 74011250637 HC RX REV CODE- 250/637: Performed by: HOSPITALIST

## 2020-11-28 PROCEDURE — 36415 COLL VENOUS BLD VENIPUNCTURE: CPT

## 2020-11-28 PROCEDURE — 74011250636 HC RX REV CODE- 250/636: Performed by: NURSE PRACTITIONER

## 2020-11-28 PROCEDURE — 65660000000 HC RM CCU STEPDOWN

## 2020-11-28 PROCEDURE — 99233 SBSQ HOSP IP/OBS HIGH 50: CPT | Performed by: SPECIALIST

## 2020-11-28 PROCEDURE — 74011250637 HC RX REV CODE- 250/637: Performed by: STUDENT IN AN ORGANIZED HEALTH CARE EDUCATION/TRAINING PROGRAM

## 2020-11-28 PROCEDURE — 80048 BASIC METABOLIC PNL TOTAL CA: CPT

## 2020-11-28 PROCEDURE — 74011250636 HC RX REV CODE- 250/636: Performed by: STUDENT IN AN ORGANIZED HEALTH CARE EDUCATION/TRAINING PROGRAM

## 2020-11-28 PROCEDURE — 74011250637 HC RX REV CODE- 250/637: Performed by: NURSE PRACTITIONER

## 2020-11-28 PROCEDURE — 85027 COMPLETE CBC AUTOMATED: CPT

## 2020-11-28 RX ADMIN — CARVEDILOL 12.5 MG: 12.5 TABLET, FILM COATED ORAL at 18:01

## 2020-11-28 RX ADMIN — ATORVASTATIN CALCIUM 80 MG: 20 TABLET, FILM COATED ORAL at 09:15

## 2020-11-28 RX ADMIN — ASPIRIN 81 MG: 81 TABLET, CHEWABLE ORAL at 09:15

## 2020-11-28 RX ADMIN — FUROSEMIDE 80 MG: 10 INJECTION, SOLUTION INTRAMUSCULAR; INTRAVENOUS at 09:15

## 2020-11-28 RX ADMIN — APIXABAN 5 MG: 5 TABLET, FILM COATED ORAL at 17:53

## 2020-11-28 RX ADMIN — Medication 10 ML: at 22:18

## 2020-11-28 RX ADMIN — Medication 10 ML: at 06:14

## 2020-11-28 RX ADMIN — CARVEDILOL 12.5 MG: 12.5 TABLET, FILM COATED ORAL at 09:15

## 2020-11-28 RX ADMIN — MILRINONE LACTATE IN DEXTROSE 0.15 MCG/KG/MIN: 200 INJECTION, SOLUTION INTRAVENOUS at 22:15

## 2020-11-28 RX ADMIN — MELATONIN TAB 3 MG 3 MG: 3 TAB at 22:14

## 2020-11-28 RX ADMIN — APIXABAN 5 MG: 5 TABLET, FILM COATED ORAL at 09:15

## 2020-11-28 RX ADMIN — AMIODARONE HYDROCHLORIDE 400 MG: 200 TABLET ORAL at 09:15

## 2020-11-28 RX ADMIN — FUROSEMIDE 80 MG: 10 INJECTION, SOLUTION INTRAMUSCULAR; INTRAVENOUS at 17:53

## 2020-11-28 RX ADMIN — Medication 10 ML: at 17:53

## 2020-11-28 NOTE — PROGRESS NOTES
[address]  YOB: 1960          Assessment & Plan:   ANNAMARIA, better today  Cardiorenal syndrome  Probably CKD 3 with some intrinsic atherosclerotic renal disease  CHF  CMP EF 20-25  CAD s/p CABG    Rec:  Renal function better with milrinone  Tolerateing diuresis  No acute indication HD  Outpt nephrology f/u p d/c  Available as needed over weekend       Subjective:   CC: f/u ANNAMARIA  HPI: Creat continues to improve on milrinone and bumex  ROS: No sob/nv/  Current Facility-Administered Medications   Medication Dose Route Frequency    milrinone (PRIMACOR) 20 MG/100 ML D5W infusion  0.15 mcg/kg/min IntraVENous CONTINUOUS    amiodarone (CORDARONE) tablet 400 mg  400 mg Oral DAILY    melatonin tablet 3 mg  3 mg Oral QHS PRN    apixaban (ELIQUIS) tablet 5 mg  5 mg Oral BID    hydrALAZINE (APRESOLINE) 20 mg/mL injection 20 mg  20 mg IntraVENous Q6H PRN    aspirin chewable tablet 81 mg  81 mg Oral DAILY    atorvastatin (LIPITOR) tablet 80 mg  80 mg Oral DAILY    [Held by provider] losartan (COZAAR) tablet 50 mg  50 mg Oral DAILY    furosemide (LASIX) injection 80 mg  80 mg IntraVENous BID    carvediloL (COREG) tablet 12.5 mg  12.5 mg Oral BID WITH MEALS    sodium chloride (NS) flush 5-40 mL  5-40 mL IntraVENous Q8H    sodium chloride (NS) flush 5-40 mL  5-40 mL IntraVENous PRN    acetaminophen (TYLENOL) tablet 650 mg  650 mg Oral Q6H PRN    Or    acetaminophen (TYLENOL) suppository 650 mg  650 mg Rectal Q6H PRN    polyethylene glycol (MIRALAX) packet 17 g  17 g Oral DAILY PRN    ondansetron (ZOFRAN) injection 4 mg  4 mg IntraVENous Q6H PRN          Objective:     Vitals:  Blood pressure 125/80, pulse (!) 58, temperature 97.5 °F (36.4 °C), resp. rate 18, height 5' 9\" (1.753 m), weight 81.5 kg (179 lb 10.8 oz), SpO2 98 %.   Temp (24hrs), Av.5 °F (36.4 °C), Min:96.7 °F (35.9 °C), Max:97.8 °F (36.6 °C)      Intake and Output:  No intake/output data recorded. 11/26 1901 - 11/28 0700  In: 350 [P.O.:350]  Out: 1150 [Urine:1150]    Physical Exam:               GENERAL ASSESSMENT: NAD  CHEST: Clear, no distress  HEART: S1S2  ABDOMEN: Soft,NT  EXTREMITY: no EDEMA  NEURO: Grossly non focal          ECG/rhythm:    Data Review      No results for input(s): TNIPOC in the last 72 hours. No lab exists for component: ITNL   No results for input(s): CPK, CKMB, TROIQ in the last 72 hours. Recent Labs     11/28/20  0619 11/27/20  0317 11/26/20  0449    137 132*   K 3.6 3.7 4.2    105 105   CO2 27 23 17*   BUN 64* 76* 67*   CREA 2.46* 3.06* 3.26*   * 110* 105*   CA 8.1* 8.2* 8.0*   ALB  --  3.3* 3.4*   WBC 10.6 11.3* 11.0   HGB 13.0 13.2 13.7   HCT 38.9 39.0 42.2    262 249      No results for input(s): INR, PTP, APTT, INREXT, INREXT in the last 72 hours. Needs: urine analysis, urine sodium, protein and creatinine  Lab Results   Component Value Date/Time    Creatinine, urine 102.58 05/20/2013 07:36 PM           : Vazquez Chong MD  11/28/2020        [facility]:  [email or web address]  Arben Hernandez office:  [address] [place] [address], [address] [place] [address],[address]  [place], [address] [facility]  Phone: [phone]  Fax :     [phone]    [place] office:  [address] [facility], [address]  Phone - [phone]  Fax - [phone]

## 2020-11-28 NOTE — PROGRESS NOTES
Bedside and Verbal shift change report given to Qiana Chen RN (oncoming nurse) by Junie Moreira RN (offgoing nurse). Report included the following information SBAR, Kardex, Intake/Output, MAR, Recent Results, Med Rec Status and Cardiac Rhythm sinus bre. 0700 Bedside and Verbal shift change report given to Ron Cason RN (oncoming nurse) by Qiana Chen RN (offgoing nurse). Report included the following information SBAR, Kardex, Intake/Output, MAR, Recent Results, Med Rec Status and Cardiac Rhythm sinus bre.

## 2020-11-28 NOTE — PROGRESS NOTES
0700:  Received report from Praveena Meehan RN    0830:  [facility] [place] and has infiltr[facility]. Holding milrinone until IV access achieved. 1040:  IV access re-established. Restarted Milrinone drip. 1247:  Rate change increase for Milrinone.     1945:  Handoff report to Harman Alonso RN

## 2020-11-28 NOTE — PROGRESS NOTES
Cardiology Progress Note                              [address] [facility]. [address], [place], [address] [facility]                                 Phone [phone]; Fax [phone]        2020 8:14 AM     Admit Date:           2020  Admit Diagnosis:  CHF (congestive heart failure) ([facility] [address] [I50.9]  :          1960   MRN:          [id number]       Impression Plan/Recommendation   1. Acute HFrEF with low output state, cardiorenal  2. Atrial flutter, paroxysmal  3. Ischemic CM, EF 20%  4. CAD s/p CABG & 2 BMS to RCA in  & BILLY to lCX   5. CKD/ANNAMARIA  6. Functional MR  7. Flat troponin elevation - likely d/t demand in the setting of acute HF  8. Tobacco use  9. Renal mass suspicious of renal cell CA  10. LFTs elevated  11. NSVT                 · Day 2# of milrinone gtt. Continue another 24 hrs or so  · Continue iv loops  · Nephrology following appreciate recommendations   · cont eliquis 5 mg BID  ·  continue Amiodarone 400 mg daily - lfts better   · EF 20-25% continue GDMT coreg, but will temporarily hold losartan w/ ANNAMARIA  · Asa/statin- R/L cardiac cath OP  · Urology consulted for renal mass- rec OP MRI in 3 months         Reviewed above plan with the patient   Hopefully home Monday    [facility] records     Cath 2013 3 V CAD, EF 45-50%  RCA: prox 30-40% instent restenosis of BMS. Distal BMS 30 instent restenosis     CABG at [facility] 2013    Cardiac cath 2018: severe 3V disease  Patent venous graft to PDA and LILMA graft to LAD  Successful PCI of prox Lcx into OM2 w BILLY    Echo 2018 LV 55% cLVH basal inferior and inferolateral wall hypokinesis. Mild RV dysfunction. No sig valve disease    Hx of lung nodule per CT 2013 5 MM JEREMY. 3 mm RLL.      Hx of pneumothorax w chest tube placement s/p fall    2018- 3.6 cm left renal mass highly suspicious of RCC            701 - 1900  In: 240 [P.O.:240]  Out: -     Last 3 Recorded Weights in this Encounter    11/26/20 0646 11/27/20 0505 11/28/20 0612   Weight: 185 lb (83.9 kg) 187 lb 3.2 oz (84.9 kg) 179 lb 10.8 oz (81.5 kg)         11/26 1901 - 11/28 0700  In: 350 [P.O.:350]  Out: 1150 [Urine:1150]    SUBJECTIVE               Makenzie Rife denies palpitations, irregular heart beat, chest pain.  Feels a little better today  Good diuresis  + PRIDE        Current Facility-Administered Medications   Medication Dose Route Frequency    milrinone (PRIMACOR) 20 MG/100 ML D5W infusion  0.15 mcg/kg/min IntraVENous CONTINUOUS    amiodarone (CORDARONE) tablet 400 mg  400 mg Oral DAILY    melatonin tablet 3 mg  3 mg Oral QHS PRN    apixaban (ELIQUIS) tablet 5 mg  5 mg Oral BID    hydrALAZINE (APRESOLINE) 20 mg/mL injection 20 mg  20 mg IntraVENous Q6H PRN    aspirin chewable tablet 81 mg  81 mg Oral DAILY    atorvastatin (LIPITOR) tablet 80 mg  80 mg Oral DAILY    [Held by provider] losartan (COZAAR) tablet 50 mg  50 mg Oral DAILY    furosemide (LASIX) injection 80 mg  80 mg IntraVENous BID    carvediloL (COREG) tablet 12.5 mg  12.5 mg Oral BID WITH MEALS    sodium chloride (NS) flush 5-40 mL  5-40 mL IntraVENous Q8H    sodium chloride (NS) flush 5-40 mL  5-40 mL IntraVENous PRN    acetaminophen (TYLENOL) tablet 650 mg  650 mg Oral Q6H PRN    Or    acetaminophen (TYLENOL) suppository 650 mg  650 mg Rectal Q6H PRN    polyethylene glycol (MIRALAX) packet 17 g  17 g Oral DAILY PRN    ondansetron (ZOFRAN) injection 4 mg  4 mg IntraVENous Q6H PRN      OBJECTIVE               Intake/Output Summary (Last 24 hours) at 11/28/2020 1159  Last data filed at 11/28/2020 0827  Gross per 24 hour   Intake 490 ml   Output 1150 ml   Net -660 ml       Review of Systems - History obtained from the patient AS PER  HPI    Telemetry NSR- SB     PHYSICAL EXAM        Visit Vitals  /80 (BP 1 Location: Left arm, BP Patient Position: Sitting)   Pulse (!) 58   Temp 97.5 °F (36.4 °C)   Resp 18   Ht 5' 9\" (1.753 m)   Wt 179 lb 10.8 oz (81.5 kg)   SpO2 98%   BMI 26.53 kg/m²       Gen: Well-developed, in no acute distress  alert and oriented x 3  HEENT:  Pink conjunctivae, Hearing grossly normal.No scleral icterus or conjunctival, moist mucous membranes  Neck: Supple,+ JVD  Resp: no accessory muscle use, Clear breath sounds, No rales or rhonchi  Card: Regular Rate,Rythm, No murmurs, rubs or gallop. No thrills. GI:        Mildly Firm and distended. NT  MSK: No cyanosis or clubbing  Skin: No rashes or ulcers. Tattoos to BUE  Neuro:  Cranial nerves are grossly intact, moving all four extremities, no focal deficit, follows commands appropriately  Psych:  Good insight, oriented to person, place and time, alert, Nml Affect  LE: +2 ble edema       DATA REVIEW            Laboratory and Imaging have been reviewed by me and are notable for  No results for input(s): CPK, CKMB, TROIQ in the last 72 hours.   Recent Labs     11/28/20  0619 11/27/20  0317 11/26/20  0449    137 132*   K 3.6 3.7 4.2   CO2 27 23 17*   BUN 64* 76* 67*   CREA 2.46* 3.06* 3.26*   * 110* 105*   WBC 10.6 11.3* 11.0   HGB 13.0 13.2 13.7   HCT 38.9 39.0 42.2    262 249             Lorne Estrella MD

## 2020-11-29 LAB
ANION GAP SERPL CALC-SCNC: 6 MMOL/L (ref 5–15)
BUN SERPL-MCNC: 50 MG/DL (ref 6–20)
BUN/CREAT SERPL: 23 (ref 12–20)
CALCIUM SERPL-MCNC: 8 MG/DL (ref 8.5–10.1)
CHLORIDE SERPL-SCNC: 104 MMOL/L (ref 97–108)
CO2 SERPL-SCNC: 28 MMOL/L (ref 21–32)
CREAT SERPL-MCNC: 2.15 MG/DL (ref 0.7–1.3)
ERYTHROCYTE [DISTWIDTH] IN BLOOD BY AUTOMATED COUNT: 13.4 % (ref 11.5–14.5)
GLUCOSE SERPL-MCNC: 97 MG/DL (ref 65–100)
HCT VFR BLD AUTO: 37.8 % (ref 36.6–50.3)
HGB BLD-MCNC: 12.8 G/DL (ref 12.1–17)
MCH RBC QN AUTO: 31.7 PG (ref 26–34)
MCHC RBC AUTO-ENTMCNC: 33.9 G/DL (ref 30–36.5)
MCV RBC AUTO: 93.6 FL (ref 80–99)
NRBC # BLD: 0 K/UL (ref 0–0.01)
NRBC BLD-RTO: 0 PER 100 WBC
PLATELET # BLD AUTO: 267 K/UL (ref 150–400)
PMV BLD AUTO: 9.4 FL (ref 8.9–12.9)
POTASSIUM SERPL-SCNC: 3.4 MMOL/L (ref 3.5–5.1)
RBC # BLD AUTO: 4.04 M/UL (ref 4.1–5.7)
SODIUM SERPL-SCNC: 138 MMOL/L (ref 136–145)
WBC # BLD AUTO: 10.1 K/UL (ref 4.1–11.1)

## 2020-11-29 PROCEDURE — 99232 SBSQ HOSP IP/OBS MODERATE 35: CPT | Performed by: SPECIALIST

## 2020-11-29 PROCEDURE — 80048 BASIC METABOLIC PNL TOTAL CA: CPT

## 2020-11-29 PROCEDURE — 65660000000 HC RM CCU STEPDOWN

## 2020-11-29 PROCEDURE — 94760 N-INVAS EAR/PLS OXIMETRY 1: CPT

## 2020-11-29 PROCEDURE — 36415 COLL VENOUS BLD VENIPUNCTURE: CPT

## 2020-11-29 PROCEDURE — 74011250637 HC RX REV CODE- 250/637: Performed by: SPECIALIST

## 2020-11-29 PROCEDURE — 85027 COMPLETE CBC AUTOMATED: CPT

## 2020-11-29 PROCEDURE — 74011250637 HC RX REV CODE- 250/637: Performed by: STUDENT IN AN ORGANIZED HEALTH CARE EDUCATION/TRAINING PROGRAM

## 2020-11-29 PROCEDURE — 74011250636 HC RX REV CODE- 250/636: Performed by: STUDENT IN AN ORGANIZED HEALTH CARE EDUCATION/TRAINING PROGRAM

## 2020-11-29 PROCEDURE — 74011250637 HC RX REV CODE- 250/637: Performed by: HOSPITALIST

## 2020-11-29 PROCEDURE — 74011250637 HC RX REV CODE- 250/637: Performed by: NURSE PRACTITIONER

## 2020-11-29 RX ORDER — FUROSEMIDE 40 MG/1
40 TABLET ORAL DAILY
Status: DISCONTINUED | OUTPATIENT
Start: 2020-11-30 | End: 2020-11-30 | Stop reason: HOSPADM

## 2020-11-29 RX ORDER — POTASSIUM CHLORIDE 750 MG/1
40 TABLET, FILM COATED, EXTENDED RELEASE ORAL
Status: COMPLETED | OUTPATIENT
Start: 2020-11-29 | End: 2020-11-29

## 2020-11-29 RX ADMIN — ASPIRIN 81 MG: 81 TABLET, CHEWABLE ORAL at 09:04

## 2020-11-29 RX ADMIN — AMIODARONE HYDROCHLORIDE 400 MG: 200 TABLET ORAL at 09:03

## 2020-11-29 RX ADMIN — CARVEDILOL 12.5 MG: 12.5 TABLET, FILM COATED ORAL at 18:11

## 2020-11-29 RX ADMIN — ATORVASTATIN CALCIUM 80 MG: 20 TABLET, FILM COATED ORAL at 09:03

## 2020-11-29 RX ADMIN — APIXABAN 5 MG: 5 TABLET, FILM COATED ORAL at 18:11

## 2020-11-29 RX ADMIN — POTASSIUM CHLORIDE 40 MEQ: 750 TABLET, FILM COATED, EXTENDED RELEASE ORAL at 11:21

## 2020-11-29 RX ADMIN — Medication 10 ML: at 20:01

## 2020-11-29 RX ADMIN — FUROSEMIDE 80 MG: 10 INJECTION, SOLUTION INTRAMUSCULAR; INTRAVENOUS at 08:59

## 2020-11-29 RX ADMIN — APIXABAN 5 MG: 5 TABLET, FILM COATED ORAL at 09:03

## 2020-11-29 RX ADMIN — Medication 10 ML: at 18:11

## 2020-11-29 RX ADMIN — CARVEDILOL 12.5 MG: 12.5 TABLET, FILM COATED ORAL at 09:03

## 2020-11-29 NOTE — PROGRESS NOTES
Bedside and Verbal shift change report given to Yamel Arreola RN (oncoming nurse) by Marlin Lucia RN (offgoing nurse). Report included the following information SBAR, Kardex, Intake/Output, MAR, Recent Results, Med Rec Status and Cardiac Rhythm sinus bre. 0730 Bedside and Verbal shift change report given to Marlin Lucia RN (oncoming nurse) by Yamel Arreola RN (offgoing nurse). Report included the following information SBAR, Kardex, Intake/Output, MAR, Recent Results, Med Rec Status and Cardiac Rhythm sinus bre.

## 2020-11-29 NOTE — ROUTINE PROCESS
Bedside shift change report given to Heide (oncoming nurse) by Uriel Whalen (offgoing nurse). Report included the following information SBAR, Kardex, Procedure Summary, Intake/Output, MAR, Accordion, Recent Results, Med Rec Status and Cardiac Rhythm NSR, sinus bre.

## 2020-11-29 NOTE — PROGRESS NOTES
Cardiology Progress Note                              [address] [facility]. [address], [place], [address] [facility]                                 Phone [phone]; Fax [phone]        2020 8:14 AM     Admit Date:           2020  Admit Diagnosis:  CHF (congestive heart failure) ([facility] [address] [I50.9]  :          1960   MRN:          [id number]       Impression Plan/Recommendation   1. Acute HFrEF with low output state, cardiorenal  2. Atrial flutter, paroxysmal - sinus  3. Ischemic CM, EF 20%  4. CAD s/p CABG & 2 BMS to RCA in  & BILLY to lCX   5. CKD/ANNAMARIA - improving renal function  6. Functional MR  7. Flat troponin elevation - likely d/t demand in the setting of acute HF  8. Tobacco use  9. Renal mass suspicious of renal cell CA  10. LFTs elevated  11. NSVT                 · Day 3# of milrinone gtt - stop at 7pm today  · Change iv loops to po  · cont eliquis 5 mg BID  · continue Amiodarone 400 mg daily - lfts better   · EF 20-25% continue GDMT coreg, but will temporarily hold losartan w/ ANNAMARIA - ideally would benefit from Entresto  · Asa/statin- R/L cardiac cath OP  · Urology consulted for renal mass- rec OP MRI in 3 months         Reviewed above plan with the patient   Hopefully home Monday    [facility] records     Cath 2013 3 V CAD, EF 45-50%  RCA: prox 30-40% instent restenosis of BMS. Distal BMS 30 instent restenosis     CABG at [facility] 2013    Cardiac cath 2018: severe 3V disease  Patent venous graft to PDA and LILMA graft to LAD  Successful PCI of prox Lcx into OM2 w BILLY    Echo 2018 LV 55% cLVH basal inferior and inferolateral wall hypokinesis. Mild RV dysfunction. No sig valve disease    Hx of lung nodule per CT 2013 5 MM JEREMY. 3 mm RLL.      Hx of pneumothorax w chest tube placement s/p fall    2018- 3.6 cm left renal mass highly suspicious of RCC             07 -  1900  In: 236.6 [I.V.:236.6]  Out: -     Last 3 Recorded Weights in this Encounter    11/27/20 0505 11/28/20 0612 11/29/20 0859   Weight: 187 lb 3.2 oz (84.9 kg) 179 lb 10.8 oz (81.5 kg) 174 lb 2.6 oz (79 kg)         11/27 1901 - 11/29 0700  In: 730 [P.O.:730]  Out: 1150 [Urine:1150]    SUBJECTIVE               Mirella Eileen denies palpitations, irregular heart beat, chest pain.  Feels  better today  Good diuresis  No significant PRIDE with ambulation around the room        Current Facility-Administered Medications   Medication Dose Route Frequency    milrinone (PRIMACOR) 20 MG/100 ML D5W infusion  0.15 mcg/kg/min IntraVENous CONTINUOUS    amiodarone (CORDARONE) tablet 400 mg  400 mg Oral DAILY    melatonin tablet 3 mg  3 mg Oral QHS PRN    apixaban (ELIQUIS) tablet 5 mg  5 mg Oral BID    hydrALAZINE (APRESOLINE) 20 mg/mL injection 20 mg  20 mg IntraVENous Q6H PRN    aspirin chewable tablet 81 mg  81 mg Oral DAILY    atorvastatin (LIPITOR) tablet 80 mg  80 mg Oral DAILY    [Held by provider] losartan (COZAAR) tablet 50 mg  50 mg Oral DAILY    furosemide (LASIX) injection 80 mg  80 mg IntraVENous BID    carvediloL (COREG) tablet 12.5 mg  12.5 mg Oral BID WITH MEALS    sodium chloride (NS) flush 5-40 mL  5-40 mL IntraVENous Q8H    sodium chloride (NS) flush 5-40 mL  5-40 mL IntraVENous PRN    acetaminophen (TYLENOL) tablet 650 mg  650 mg Oral Q6H PRN    Or    acetaminophen (TYLENOL) suppository 650 mg  650 mg Rectal Q6H PRN    polyethylene glycol (MIRALAX) packet 17 g  17 g Oral DAILY PRN    ondansetron (ZOFRAN) injection 4 mg  4 mg IntraVENous Q6H PRN      OBJECTIVE               Intake/Output Summary (Last 24 hours) at 11/29/2020 1031  Last data filed at 11/29/2020 0715  Gross per 24 hour   Intake 476.55 ml   Output    Net 476.55 ml       Review of Systems - History obtained from the patient AS PER  HPI    Telemetry NSR- SB     PHYSICAL EXAM        Visit Vitals  BP (!) 143/88 (BP 1 Location: Left arm, BP Patient Position: Sitting)   Pulse 60   Temp 97.6 °F (36.4 °C)   Resp 18   Ht 5' 9\" (1.753 m)   Wt 174 lb 2.6 oz (79 kg)   SpO2 96%   BMI 25.72 kg/m²       Gen: Well-developed, in no acute distress  alert and oriented x 3  HEENT:  Pink conjunctivae, Hearing grossly normal.No scleral icterus or conjunctival, moist mucous membranes  Neck: Supple,+ JVD  Resp: no accessory muscle use, Clear breath sounds, No rales or rhonchi  Card: Regular Rate,Rythm, No murmurs, rubs or gallop. No thrills. GI:        Mildly Firm and distended. NT  MSK: No cyanosis or clubbing  Skin: No rashes or ulcers. Tattoos to BUE  Neuro:  Cranial nerves are grossly intact, moving all four extremities, no focal deficit, follows commands appropriately  Psych:  Good insight, oriented to person, place and time, alert, Nml Affect  LE: no edema       DATA REVIEW            Laboratory and Imaging have been reviewed by me and are notable for  No results for input(s): CPK, CKMB, TROIQ in the last 72 hours.   Recent Labs     11/29/20  0430 11/28/20  0619 11/27/20  0317    138 137   K 3.4* 3.6 3.7   CO2 28 27 23   BUN 50* 64* 76*   CREA 2.15* 2.46* 3.06*   GLU 97 114* 110*   WBC 10.1 10.6 11.3*   HGB 12.8 13.0 13.2   HCT 37.8 38.9 39.0    280 262             Kaden Bateman MD

## 2020-11-30 VITALS
HEART RATE: 91 BPM | DIASTOLIC BLOOD PRESSURE: 79 MMHG | HEIGHT: 69 IN | RESPIRATION RATE: 18 BRPM | TEMPERATURE: 98.1 F | SYSTOLIC BLOOD PRESSURE: 151 MMHG | WEIGHT: 176.37 LBS | OXYGEN SATURATION: 94 % | BODY MASS INDEX: 26.12 KG/M2

## 2020-11-30 LAB
ALBUMIN SERPL-MCNC: 3.4 G/DL (ref 3.5–5)
ALBUMIN/GLOB SERPL: 0.9 {RATIO} (ref 1.1–2.2)
ALP SERPL-CCNC: 85 U/L (ref 45–117)
ALT SERPL-CCNC: 63 U/L (ref 12–78)
ANION GAP SERPL CALC-SCNC: 5 MMOL/L (ref 5–15)
AST SERPL-CCNC: 27 U/L (ref 15–37)
ATRIAL RATE: 60 BPM
BILIRUB SERPL-MCNC: 0.9 MG/DL (ref 0.2–1)
BUN SERPL-MCNC: 39 MG/DL (ref 6–20)
BUN/CREAT SERPL: 22 (ref 12–20)
CALCIUM SERPL-MCNC: 8.4 MG/DL (ref 8.5–10.1)
CALCULATED P AXIS, ECG09: 59 DEGREES
CALCULATED R AXIS, ECG10: 34 DEGREES
CALCULATED T AXIS, ECG11: -13 DEGREES
CHLORIDE SERPL-SCNC: 104 MMOL/L (ref 97–108)
CO2 SERPL-SCNC: 29 MMOL/L (ref 21–32)
CREAT SERPL-MCNC: 1.8 MG/DL (ref 0.7–1.3)
DIAGNOSIS, 93000: NORMAL
ERYTHROCYTE [DISTWIDTH] IN BLOOD BY AUTOMATED COUNT: 13.4 % (ref 11.5–14.5)
GLOBULIN SER CALC-MCNC: 3.7 G/DL (ref 2–4)
GLUCOSE SERPL-MCNC: 108 MG/DL (ref 65–100)
HCT VFR BLD AUTO: 40.7 % (ref 36.6–50.3)
HGB BLD-MCNC: 13.7 G/DL (ref 12.1–17)
MAGNESIUM SERPL-MCNC: 2.4 MG/DL (ref 1.6–2.4)
MCH RBC QN AUTO: 31.6 PG (ref 26–34)
MCHC RBC AUTO-ENTMCNC: 33.7 G/DL (ref 30–36.5)
MCV RBC AUTO: 94 FL (ref 80–99)
NRBC # BLD: 0 K/UL (ref 0–0.01)
NRBC BLD-RTO: 0 PER 100 WBC
P-R INTERVAL, ECG05: 212 MS
PLATELET # BLD AUTO: 299 K/UL (ref 150–400)
PMV BLD AUTO: 9.4 FL (ref 8.9–12.9)
POTASSIUM SERPL-SCNC: 3.5 MMOL/L (ref 3.5–5.1)
PROT SERPL-MCNC: 7.1 G/DL (ref 6.4–8.2)
Q-T INTERVAL, ECG07: 462 MS
QRS DURATION, ECG06: 116 MS
QTC CALCULATION (BEZET), ECG08: 462 MS
RBC # BLD AUTO: 4.33 M/UL (ref 4.1–5.7)
SODIUM SERPL-SCNC: 138 MMOL/L (ref 136–145)
VENTRICULAR RATE, ECG03: 60 BPM
WBC # BLD AUTO: 10.9 K/UL (ref 4.1–11.1)

## 2020-11-30 PROCEDURE — 85027 COMPLETE CBC AUTOMATED: CPT

## 2020-11-30 PROCEDURE — 74011250637 HC RX REV CODE- 250/637: Performed by: SPECIALIST

## 2020-11-30 PROCEDURE — 80053 COMPREHEN METABOLIC PANEL: CPT

## 2020-11-30 PROCEDURE — 74011250637 HC RX REV CODE- 250/637: Performed by: HOSPITALIST

## 2020-11-30 PROCEDURE — 99238 HOSP IP/OBS DSCHRG MGMT 30/<: CPT | Performed by: SPECIALIST

## 2020-11-30 PROCEDURE — 74011250637 HC RX REV CODE- 250/637: Performed by: STUDENT IN AN ORGANIZED HEALTH CARE EDUCATION/TRAINING PROGRAM

## 2020-11-30 PROCEDURE — 94760 N-INVAS EAR/PLS OXIMETRY 1: CPT

## 2020-11-30 PROCEDURE — 83735 ASSAY OF MAGNESIUM: CPT

## 2020-11-30 PROCEDURE — 74011250637 HC RX REV CODE- 250/637: Performed by: NURSE PRACTITIONER

## 2020-11-30 PROCEDURE — 93005 ELECTROCARDIOGRAM TRACING: CPT

## 2020-11-30 PROCEDURE — 36415 COLL VENOUS BLD VENIPUNCTURE: CPT

## 2020-11-30 RX ORDER — AMIODARONE HYDROCHLORIDE 200 MG/1
200 TABLET ORAL DAILY
Qty: 30 TAB | Refills: 2 | Status: SHIPPED | OUTPATIENT
Start: 2020-11-30 | End: 2021-01-14

## 2020-11-30 RX ORDER — CARVEDILOL 12.5 MG/1
12.5 TABLET ORAL 2 TIMES DAILY WITH MEALS
Qty: 60 TAB | Refills: 5 | Status: SHIPPED | OUTPATIENT
Start: 2020-11-30 | End: 2020-12-18 | Stop reason: DRUGHIGH

## 2020-11-30 RX ORDER — BUMETANIDE 1 MG/1
1 TABLET ORAL DAILY
Qty: 30 TAB | Refills: 2 | Status: SHIPPED | OUTPATIENT
Start: 2020-11-30 | End: 2021-01-26

## 2020-11-30 RX ORDER — POTASSIUM CHLORIDE 750 MG/1
20 TABLET, FILM COATED, EXTENDED RELEASE ORAL
Status: COMPLETED | OUTPATIENT
Start: 2020-11-30 | End: 2020-11-30

## 2020-11-30 RX ADMIN — AMIODARONE HYDROCHLORIDE 400 MG: 200 TABLET ORAL at 08:54

## 2020-11-30 RX ADMIN — FUROSEMIDE 40 MG: 40 TABLET ORAL at 08:54

## 2020-11-30 RX ADMIN — ASPIRIN 81 MG: 81 TABLET, CHEWABLE ORAL at 08:54

## 2020-11-30 RX ADMIN — ATORVASTATIN CALCIUM 80 MG: 20 TABLET, FILM COATED ORAL at 08:54

## 2020-11-30 RX ADMIN — POTASSIUM CHLORIDE 20 MEQ: 750 TABLET, FILM COATED, EXTENDED RELEASE ORAL at 11:36

## 2020-11-30 RX ADMIN — Medication 10 ML: at 05:45

## 2020-11-30 RX ADMIN — CARVEDILOL 12.5 MG: 12.5 TABLET, FILM COATED ORAL at 08:56

## 2020-11-30 RX ADMIN — APIXABAN 5 MG: 5 TABLET, FILM COATED ORAL at 08:54

## 2020-11-30 NOTE — PROGRESS NOTES
Bedside shift change report given to Pascual Miranda (oncoming nurse) by Makenzie Peña (offgoing nurse). Report included the following information SBAR, Kardex, Procedure Summary, Intake/Output, MAR, Accordion, Recent Results, Med Rec Status and Cardiac Rhythm NSR AV block.

## 2020-11-30 NOTE — PROGRESS NOTES
[address] [place]  YOB: 1960          Assessment & Plan:   ANNAMARIA, better yesterday, no labs today  Cardiorenal syndrome  Probably CKD 3 with some intrinsic atherosclerotic renal disease  CHF  CMP EF 20-25  CAD s/p CABG    Rec:  Loops  Daily home BP. Wts  Outpt nephrology f/u p d/c        Subjective:   CC: f/u ANNAMARIA  HPI: Creat better  On lasix. K low  ROS: No sob/nv/  Current Facility-Administered Medications   Medication Dose Route Frequency    furosemide (LASIX) tablet 40 mg  40 mg Oral DAILY    amiodarone (CORDARONE) tablet 400 mg  400 mg Oral DAILY    melatonin tablet 3 mg  3 mg Oral QHS PRN    apixaban (ELIQUIS) tablet 5 mg  5 mg Oral BID    hydrALAZINE (APRESOLINE) 20 mg/mL injection 20 mg  20 mg IntraVENous Q6H PRN    aspirin chewable tablet 81 mg  81 mg Oral DAILY    atorvastatin (LIPITOR) tablet 80 mg  80 mg Oral DAILY    [Held by provider] losartan (COZAAR) tablet 50 mg  50 mg Oral DAILY    carvediloL (COREG) tablet 12.5 mg  12.5 mg Oral BID WITH MEALS    sodium chloride (NS) flush 5-40 mL  5-40 mL IntraVENous Q8H    sodium chloride (NS) flush 5-40 mL  5-40 mL IntraVENous PRN    acetaminophen (TYLENOL) tablet 650 mg  650 mg Oral Q6H PRN    Or    acetaminophen (TYLENOL) suppository 650 mg  650 mg Rectal Q6H PRN    polyethylene glycol (MIRALAX) packet 17 g  17 g Oral DAILY PRN    ondansetron (ZOFRAN) injection 4 mg  4 mg IntraVENous Q6H PRN          Objective:     Vitals:  Blood pressure (!) 151/79, pulse 91, temperature 98.1 °F (36.7 °C), resp. rate 18, height 5' 9\" (1.753 m), weight 80 kg (176 lb 5.9 oz), SpO2 94 %. Temp (24hrs), Av °F (36.7 °C), Min:97.7 °F (36.5 °C), Max:98.1 °F (36.7 °C)      Intake and Output:  No intake/output data recorded.    1901 -  0700  In: 746.4 [P.O.:480; I.V.:266.4]  Out: -     Physical Exam:               GENERAL ASSESSMENT: NAD  CHEST: Clear, no distress  HEART: S1S2  ABDOMEN: Soft,NT  EXTREMITY: no EDEMA  NEURO: Grossly non focal          ECG/rhythm:    Data Review      No results for input(s): TNIPOC in the last 72 hours. No lab exists for component: ITNL   No results for input(s): CPK, CKMB, TROIQ in the last 72 hours. Recent Labs     11/30/20  0903 11/29/20  0430 11/28/20  0619   NA  --  138 138   K  --  3.4* 3.6   CL  --  104 105   CO2  --  28 27   BUN  --  50* 64*   CREA  --  2.15* 2.46*   GLU  --  97 114*   CA  --  8.0* 8.1*   WBC 10.9 10.1 10.6   HGB 13.7 12.8 13.0   HCT 40.7 37.8 38.9    267 280      No results for input(s): INR, PTP, APTT, INREXT, INREXT in the last 72 hours. Needs: urine analysis, urine sodium, protein and creatinine  Lab Results   Component Value Date/Time    Creatinine, urine 102.58 05/20/2013 07:36 PM           : Doe Emmanuel MD  11/30/2020        [facility]:  [email or web address]  Abeba Mcneal office:  [address] [place] [address],[address]  [place], [address] [place] [address]  Phone: [phone]  Fax :     [phone]    [address] [place] [address] office:  [address] [place] [address]  Phone - [phone]  Fax - [phone]

## 2020-11-30 NOTE — DISCHARGE SUMMARY
Cardiology Discharge Summary     Patient ID:  Esau Bence  [id number]  68 y.o.  1960    Admit Date: 11/23/2020    Discharge Date: 11/30/2020     Admitting Physician: Alonzo Restrepo MD     Discharge Physician:Dr Rebeca Bridges NP    Admission Diagnoses: CHF (congestive heart failure) [facility]) [I50.9]    Discharge Diagnoses: Active Problems:    New onset atrial flutter ([facility] [address] (11/23/2020)      Acute congestive heart failure ([facility] [address] (11/23/2020)      CAD (coronary artery disease) (11/23/2020)      ANNAMARIA (acute kidney injury) ([facility] [address] (11/23/2020)      Dyspnea on exertion (11/23/2020)      Tobacco abuse (11/23/2020)      Hypertensive urgency (11/24/2020)      Elevated troponin (11/24/2020)        Discharge Condition: Stable    Cardiology Procedures this Admission:  EchoCardiogram    Consults: Nephrology   Urology       Discharge Exam:     Visit Vitals  BP (!) 151/79 (BP 1 Location: Left arm, BP Patient Position: Post activity)   Pulse 91   Temp 98.1 °F (36.7 °C)   Resp 18   Ht 5' 9\" (1.753 m)   Wt 176 lb 5.9 oz (80 kg)   SpO2 94%   BMI 26.05 kg/m²     General Appearance:  Well developed, well nourished,alert and oriented x 3, and individual in no acute distress. Ears/Nose/Mouth/Throat:   Hearing grossly normal.         Neck: Supple. Chest:   Lungs clear to auscultation bilaterally. Cardiovascular:  Regular rate and rhythm,  no murmur. Abdomen:   Soft, non-tender, bowel sounds are active. Extremities: +1 edema bilaterally. Skin: Warm and dry. HOSPITAL COURSE: Esau Bence is a 64 y.o. male who was admitted for acute HfrEF/ANNAMARIA/atrial flutter RVR. He converted to NSr with IV amiodarone. Echo showed LVEF 20-25% (known to be normal in 2018 per records). He had a poor response to IV diuretics and worsen renal function. Therefore, a milrinone gtt was started, over the last several days he has had a good uop and renal function has started to recover. Impression Plan/Recommendation   1.  Acute HFrEF with low output state, cardiorenal  2. Atrial flutter, paroxysmal - sinus  3. Ischemic CM, EF 20%  4. CAD s/p CABG & 2 BMS to RCA in 2007 & BILLY to lCX 2018  5. CKD/ANNAMARIA - improving renal function  6. Functional MR  7. Flat troponin elevation - likely d/t demand in the setting of acute HF  8. Tobacco use  9. Renal mass suspicious of renal cell CA  10. LFTs elevated  11. NSVT                         · Milrinone gtt - stopped 11/29  · Now on PO diuretics - fair UOP per I & O  · cont eliquis 5 mg BID- 30 day free card and he will need samples in the office until his medcaid is approved   · Change amiodarone 200 mg daily   · EF 20-25% continue GDMT coreg, but will temporarily hold losartan w/ ANNAMARIA (d/w Dr Bethanie Sawyer) - ideally would benefit from [facility]  · Asa/statin- R/L cardiac cath OP  · Urology consulted for renal mass- rec OP MRI in 3 months            Reviewed above plan with the patient   Home today   Future Appointments   Date Time Provider Iwona Betancur   12/2/2020 11:40 AM Rose Christensen DO CAVSF BS AMB            Admission CHF NYHA class IV  Discharge CHF NYHA class II    Disposition: home    Patient Instructions:   Current Discharge Medication List      START taking these medications    Details   amiodarone (CORDARONE) 200 mg tablet Take 1 Tab by mouth daily. Qty: 30 Tab, Refills: 2      apixaban (ELIQUIS) 5 mg tablet Take 1 Tab by mouth two (2) times a day. Qty: 60 Tab, Refills: 5      carvediloL (COREG) 12.5 mg tablet Take 1 Tab by mouth two (2) times daily (with meals). Qty: 60 Tab, Refills: 5      bumetanide (BUMEX) 1 mg tablet Take 1 Tab by mouth daily. Qty: 30 Tab, Refills: 2         CONTINUE these medications which have NOT CHANGED    Details   atorvastatin (LIPITOR) 80 mg tablet Take 80 mg by mouth daily. aspirin 81 mg chewable tablet Take 81 mg by mouth daily.          STOP taking these medications       lisinopriL (PRINIVIL, ZESTRIL) 40 mg tablet Comments:   Reason for Stopping: metoprolol (LOPRESSOR) 25 mg tablet Comments:   Reason for Stopping:                 Referenced discharge instructions provided by nursing for diet and activity. Follow-up with Dr Tamiko Washington   Date Time Provider Iwona Betancur   12/2/2020 11:40 AM DO ODALYS Khoury BS AMB         Signed:  Michael Khan NP  11/30/2020  8:21 AM      CARDIOLOGY ATTENDING  Patient personally seen and examined. All the elements of history and examination were personally performed. Assessment and plan was discussed and agree as written above    He is doing better and ready to go home. Renal function is better. Hrt RRR without murmur. Lungs clear. DC home today with FU with Dr. Nanci Hendrix. Plan to reconsider ACE-I/ARB as outpatient.      Rich Morejon MD, [facility]

## 2020-11-30 NOTE — PROGRESS NOTES
Bedside and Verbal shift change report given to Janneth Vail RN (oncoming nurse) by Hadley Mulligan RN (offgoing nurse). Report included the following information SBAR, Kardex, Intake/Output, MAR, Recent Results, Med Rec Status and Cardiac Rhythm sinus bre. [id number] Paged Dr. Yasmine Marin. Patient is up and ready to go home whenever he can. He refuses to continue wearing his telemetry monitor. Requested an order to discontinuation of telemetry. Dr. Ivet Gaming called back but did not speak to this nurse. [id number] Bedside and Verbal shift change report given to Bianca Escalante, PATRICK and Paula Esteban RN (oncoming nurse) by Janneth Vail RN (offgoing nurse). Report included the following information SBAR, Kardex, Intake/Output, MAR, Recent Results, Med Rec Status and Cardiac Rhythm sinus bre.

## 2020-11-30 NOTE — ROUTINE PROCESS
Bedside shift change report given to Heide (oncoming nurse) by Carolyn Miles (offgoing nurse). Report included the following information SBAR, Kardex, Procedure Summary, Intake/Output, MAR, Accordion, Recent Results, Med Rec Status and Cardiac Rhythm NSR, sinus bre.

## 2020-12-02 ENCOUNTER — OFFICE VISIT (OUTPATIENT)
Dept: CARDIOLOGY CLINIC | Age: 60
End: 2020-12-02
Payer: MEDICAID

## 2020-12-02 VITALS
SYSTOLIC BLOOD PRESSURE: 126 MMHG | DIASTOLIC BLOOD PRESSURE: 68 MMHG | BODY MASS INDEX: 27.55 KG/M2 | OXYGEN SATURATION: 97 % | HEART RATE: 64 BPM | HEIGHT: 69 IN | WEIGHT: 186 LBS

## 2020-12-02 DIAGNOSIS — I25.10 CORONARY ARTERY DISEASE INVOLVING NATIVE CORONARY ARTERY OF NATIVE HEART WITHOUT ANGINA PECTORIS: ICD-10-CM

## 2020-12-02 DIAGNOSIS — Z09 HOSPITAL DISCHARGE FOLLOW-UP: ICD-10-CM

## 2020-12-02 DIAGNOSIS — I48.91 ATRIAL FIBRILLATION AND FLUTTER (HCC): ICD-10-CM

## 2020-12-02 DIAGNOSIS — I48.92 ATRIAL FIBRILLATION AND FLUTTER (HCC): ICD-10-CM

## 2020-12-02 DIAGNOSIS — I50.42 CHRONIC COMBINED SYSTOLIC AND DIASTOLIC CONGESTIVE HEART FAILURE (HCC): ICD-10-CM

## 2020-12-02 DIAGNOSIS — N28.89 RENAL MASS: ICD-10-CM

## 2020-12-02 DIAGNOSIS — N18.9 CHRONIC KIDNEY DISEASE, UNSPECIFIED CKD STAGE: ICD-10-CM

## 2020-12-02 DIAGNOSIS — I10 ESSENTIAL HYPERTENSION: ICD-10-CM

## 2020-12-02 PROCEDURE — 99214 OFFICE O/P EST MOD 30 MIN: CPT | Performed by: STUDENT IN AN ORGANIZED HEALTH CARE EDUCATION/TRAINING PROGRAM

## 2020-12-02 PROCEDURE — 1111F DSCHRG MED/CURRENT MED MERGE: CPT | Performed by: STUDENT IN AN ORGANIZED HEALTH CARE EDUCATION/TRAINING PROGRAM

## 2020-12-02 NOTE — PROGRESS NOTES
Lon Neal is a 64 y.o. male    Chief Complaint   Patient presents with   [facility] Follow Up     aflutter       Chest pain No    SOB No    Dizziness No; patient states he feels off     Swelling No    Refills No    Visit Vitals  /68 (BP 1 Location: Left arm, BP Patient Position: Sitting)   Pulse 64   Ht 5' 9\" (1.753 m)   Wt 186 lb (84.4 kg)   SpO2 97%   BMI 27.47 kg/m²       1. Have you been to the ER, urgent care clinic since your last visit? Hospitalized since your last visit? 11/23/20 ED    2. Have you seen or consulted any other health care providers outside of the [address] [place] [address] since your last visit? Include any pap smears or colon screening.   No

## 2020-12-05 NOTE — PROGRESS NOTES
[facility] [address], [address] [facility], [address] [place] [address]    Office [phone],[facility] [phone]           Lyubov Billy is a 64 y.o. male presents for hospital f/up for HFrEF. Assessment/Recommendations:    ICD-10-CM ICD-9-CM    1. Chronic combined systolic and diastolic congestive heart failure (HCC)  W90.61 888.43 METABOLIC PANEL, BASIC     428.0 NT-PRO BNP   2. Coronary artery disease involving native coronary artery of native heart without angina pectoris  I25.10 414.01    3. Atrial fibrillation and flutter (HCC)  I48.91 427.31     I48.92 427.32    4. Renal mass  N28.89 593.9    5. Chronic kidney disease, unspecified CKD stage  N18.9 585.9    6. Essential hypertension  I10 401.9      HFrEF- ischemic in etiology. No angina symptoms. Likely will need repeat R/LHC, deferred during recent hospitalization due to renal dysfunction  - cont coreg 12.5mg twice daily  - add ARB pending repeat renal function. Long term consider entresto pending insurance or we will need to complete patient assistance  - cont bumex 1mg daily, appears to continue to have robust urine response  - ICD pending LVEF    CAD- hx of cabg and pci. Cont asa 81mg, atorvastatin 80mg daily    Aflutter- sinus rhythm, continue amiodarone and eliquis. Consider AFlutter ablation pending clinical course    Functional MR- moderate MR on echo 11/20    CKD, cardiorenal syndrome    Left renal mas- urology f/up    Tobacco abuse      Primary Care Physician- Hugh Trinidad MD    Follow-up 2-3 weeks        Subjective:  64 y.o. presents to office f/up of recent hospitalization. Presented with decompensated HF. No f/up of CAD since CABG in 2010 and prior PCI. No PCP f/up as out-pt. Presented with lvef of 20-25% with moderate MR, atrial flutter with RVR. Hospital course required inotropic therapy due to ANNAMARIA likely on ckd for diuresis. Dyspnea symptoms improving. Orthopnea resolved. Continues to have good urine output with bumex 1mg daily. Past Medical History:   Diagnosis Date    CAD (coronary artery disease)     Hypertension         Past Surgical History:   Procedure Laterality Date    CARDIAC SURG PROCEDURE UNLIST      stents         Current Outpatient Medications:     amiodarone (CORDARONE) 200 mg tablet, Take 1 Tab by mouth daily. , Disp: 30 Tab, Rfl: 2    apixaban (ELIQUIS) 5 mg tablet, Take 1 Tab by mouth two (2) times a day., Disp: 60 Tab, Rfl: 5    carvediloL (COREG) 12.5 mg tablet, Take 1 Tab by mouth two (2) times daily (with meals). , Disp: 60 Tab, Rfl: 5    bumetanide (BUMEX) 1 mg tablet, Take 1 Tab by mouth daily. , Disp: 30 Tab, Rfl: 2    atorvastatin (LIPITOR) 80 mg tablet, Take 80 mg by mouth daily. , Disp: , Rfl:     aspirin 81 mg chewable tablet, Take 81 mg by mouth daily. , Disp: , Rfl:     No Known Allergies     No family history on file. Social History     Tobacco Use    Smoking status: Current Every Day Smoker   Substance Use Topics    Alcohol use: Not on file    Drug use: No       Review of Symptoms:  Pertinent Positive: improved dyspnea  Pertinent Negative: No chest pain, orthopnea, PND    All Other systems reviewed and are negative for a Comprehensive ROS (10+)    Physical Exam    Blood pressure 126/68, pulse 64, height 5' 9\" (1.753 m), weight 186 lb (84.4 kg), SpO2 97 %. Constitutional:  well-developed and well-nourished. No distress. HENT: Normocephalic. Eyes: No scleral icterus. Neck:  Neck supple. No JVD present. Pulmonary/Chest: Effort normal and breath sounds normal. No respiratory distress, wheezes or rales. Cardiovascular: Normal rate, regular rhythm, S1 S2 . Exam reveals no gallop and no friction rub. No murmur heard. No edema. Extremities:  Normal muscle tone  Abdominal:   No abnormal distension. Neurological:  Moving all extremities, cranial nerves appear grossly intact. Skin: Skin is not cold. Not diaphoretic. No erythema. Psychiatric:  Grossly normal mood and affect. Intact insight. Objective Data:    [facility] records     Cath 8/2013 3 V CAD, EF 45-50%  RCA: prox 30-40% instent restenosis of BMS. Distal BMS 30 instent restenosis     CABG at [facility] 8/2013    Cardiac cath 2018: severe 3V disease  Patent venous graft to PDA and LILMA graft to LAD  Successful PCI of prox Lcx into OM2 w BILLY    Echo 2018 LV 55% cLVH basal inferior and inferolateral wall hypokinesis. Mild RV dysfunction. No sig valve disease    Hx of lung nodule per CT 8/2013 5 MM JEREMY. 3 mm RLL. Hx of pneumothorax w chest tube placement s/p fall    6/2018- 3.6 cm left renal mass highly suspicious of RCC    11/23/20   ECHO ADULT COMPLETE 11/24/2020 11/24/2020    Narrative · LV: Estimated LVEF is 20 - 25%. Mildly dilated left ventricle. Upper   normal wall thickness. Severely reduced systolic function. Left   ventricular diastolic dysfunction. The findings are consistent with   ischemic cardiomyopathy. · RV: Moderately reduced systolic function. · LA: Moderately dilated left atrium. · RA: Mildly dilated right atrium. · AV: Sclerosis with reduced excursion, no stenosis. · MV: Mitral valve leaflet calcification. Mild mitral annular   calcification. Moderate mitral valve regurgitation is present. · TV: Mild to moderate tricuspid valve regurgitation is present. · PA: Mild pulmonary hypertension. Pulmonary arterial systolic pressure is   40 mmHg. · IVC: Moderately elevated central venous pressure (10-15 mmHg); IVC   diameter is larger than 21 mm and collapses more than 50% with   respiration. Signed by: DO Ayad Ferrara DO          ATTENTION:   This medical record was transcribed using an electronic medical records/speech recognition system. Although proofread, it may and can contain electronic, spelling and other errors. Corrections may be executed at a later time.   Please feel free to contact us for any clarifications as needed.

## 2020-12-11 LAB
BUN SERPL-MCNC: 25 MG/DL (ref 6–24)
BUN/CREAT SERPL: 16 (ref 9–20)
CALCIUM SERPL-MCNC: 9.4 MG/DL (ref 8.7–10.2)
CHLORIDE SERPL-SCNC: 101 MMOL/L (ref 96–106)
CO2 SERPL-SCNC: 22 MMOL/L (ref 20–29)
CREAT SERPL-MCNC: 1.52 MG/DL (ref 0.76–1.27)
GLUCOSE SERPL-MCNC: 166 MG/DL (ref 65–99)
INTERPRETATION: NORMAL
NT-PROBNP SERPL-MCNC: 3276 PG/ML (ref 0–210)
POTASSIUM SERPL-SCNC: 4.3 MMOL/L (ref 3.5–5.2)
SODIUM SERPL-SCNC: 139 MMOL/L (ref 134–144)

## 2020-12-18 ENCOUNTER — OFFICE VISIT (OUTPATIENT)
Dept: CARDIOLOGY CLINIC | Age: 60
End: 2020-12-18
Payer: MEDICAID

## 2020-12-18 VITALS
DIASTOLIC BLOOD PRESSURE: 100 MMHG | OXYGEN SATURATION: 97 % | HEART RATE: 69 BPM | BODY MASS INDEX: 26.96 KG/M2 | WEIGHT: 182 LBS | SYSTOLIC BLOOD PRESSURE: 170 MMHG | HEIGHT: 69 IN

## 2020-12-18 DIAGNOSIS — I10 ESSENTIAL HYPERTENSION: ICD-10-CM

## 2020-12-18 DIAGNOSIS — I50.21 ACUTE SYSTOLIC CONGESTIVE HEART FAILURE (HCC): Primary | ICD-10-CM

## 2020-12-18 DIAGNOSIS — I25.10 CORONARY ARTERY DISEASE INVOLVING NATIVE CORONARY ARTERY OF NATIVE HEART WITHOUT ANGINA PECTORIS: ICD-10-CM

## 2020-12-18 DIAGNOSIS — N28.89 RENAL MASS: ICD-10-CM

## 2020-12-18 DIAGNOSIS — I48.91 ATRIAL FIBRILLATION AND FLUTTER (HCC): ICD-10-CM

## 2020-12-18 DIAGNOSIS — N18.9 CHRONIC KIDNEY DISEASE, UNSPECIFIED CKD STAGE: ICD-10-CM

## 2020-12-18 DIAGNOSIS — I48.92 ATRIAL FIBRILLATION AND FLUTTER (HCC): ICD-10-CM

## 2020-12-18 PROCEDURE — 99214 OFFICE O/P EST MOD 30 MIN: CPT | Performed by: STUDENT IN AN ORGANIZED HEALTH CARE EDUCATION/TRAINING PROGRAM

## 2020-12-18 RX ORDER — SACUBITRIL AND VALSARTAN 24; 26 MG/1; MG/1
1 TABLET, FILM COATED ORAL 2 TIMES DAILY
Qty: 60 TAB | Refills: 3 | Status: SHIPPED | OUTPATIENT
Start: 2020-12-18 | End: 2021-01-05 | Stop reason: ALTCHOICE

## 2020-12-18 RX ORDER — CARVEDILOL 25 MG/1
25 TABLET ORAL 2 TIMES DAILY WITH MEALS
Qty: 60 TAB | Refills: 3 | Status: SHIPPED | OUTPATIENT
Start: 2020-12-18

## 2020-12-18 NOTE — PROGRESS NOTES
[facility] [address], [address] [facility], [address] [place] [address]    Office [phone],[facility] [phone]           Loretta Lee is a 64 y.o. male presents for hospital f/up for HFrEF. Assessment/Recommendations:      HFrEF- ischemic in etiology. No angina symptoms. Likely will need repeat R/LHC, deferred during recent hospitalization due to renal dysfunction  - Titrate Coreg to 25 mg twice daily  - Add Entresto step 1  - cont bumex 1mg daily  - ICD pending LVEF  - Nurse practitioner follow-up in 2 weeks for medication titration. Recommend patient repeat basic metabolic panel and NT proBNP few days prior.  - If his renal function remains stable, recommend we proceed with left heart catheterization. Hypertensionelevated in the office today. Medication titration per above    CAD- hx of cabg and pci. Cont asa 81mg, atorvastatin 80mg daily    Aflutter- sinus rhythm, continue amiodarone and eliquis. Consider AFlutter ablation pending clinical course. Functional MR- moderate MR on echo 11/20    CKD, cardiorenal syndrome    Left renal mass, and flank pain-encourage patient to schedule follow-up visit with urology    Tobacco abuse        Primary Care Physician- Natali Joseph MD    Follow-up 2-3 weeks        Subjective:  64 y.o. presents to office f/up of recent hospitalization. Since last visit he continues to do well. He reports class II dyspnea symptoms. He reports his blood pressure at home has been 100 3140 mmHg. Blood pressure severely elevated in the office today at 164/96. He reports ongoing robust urine response to diuretic therapy. Recent NT proBNP decreased from 6043-6345. Creatinine remained stable at 1.52. No adverse bleeding with DOAC.     Past Medical History:   Diagnosis Date    CAD (coronary artery disease)     Hypertension         Past Surgical History:   Procedure Laterality Date    CARDIAC SURG PROCEDURE UNLIST stents         Current Outpatient Medications:     carvediloL (COREG) 25 mg tablet, Take 1 Tab by mouth two (2) times daily (with meals). , Disp: 60 Tab, Rfl: 3    sacubitriL-valsartan (Entresto) 24-26 mg tablet, Take 1 Tab by mouth two (2) times a day., Disp: 60 Tab, Rfl: 3    amiodarone (CORDARONE) 200 mg tablet, Take 1 Tab by mouth daily. , Disp: 30 Tab, Rfl: 2    apixaban (ELIQUIS) 5 mg tablet, Take 1 Tab by mouth two (2) times a day., Disp: 60 Tab, Rfl: 5    bumetanide (BUMEX) 1 mg tablet, Take 1 Tab by mouth daily. , Disp: 30 Tab, Rfl: 2    atorvastatin (LIPITOR) 80 mg tablet, Take 80 mg by mouth daily. , Disp: , Rfl:     aspirin 81 mg chewable tablet, Take 81 mg by mouth daily. , Disp: , Rfl:     No Known Allergies     No family history on file. Social History     Tobacco Use    Smoking status: Current Every Day Smoker    Smokeless tobacco: Former User   Substance Use Topics    Alcohol use: Not Currently    Drug use: No       Review of Symptoms:  Pertinent Positive: improved dyspnea  Pertinent Negative: No chest pain, orthopnea, PND    All Other systems reviewed and are negative for a Comprehensive ROS (10+)    Physical Exam    Blood pressure (!) 170/100, pulse 69, height 5' 9\" (1.753 m), weight 182 lb (82.6 kg), SpO2 97 %. Constitutional:  well-developed and well-nourished. No distress. HENT: Normocephalic. Eyes: No scleral icterus. Neck:  Neck supple. No JVD present. Pulmonary/Chest: Effort normal and breath sounds normal. No respiratory distress, wheezes or rales. Cardiovascular: Normal rate, regular rhythm, S1 S2 . Exam reveals no gallop and no friction rub. No murmur heard. No edema. Extremities:  Normal muscle tone  Abdominal:   No abnormal distension. Neurological:  Moving all extremities, cranial nerves appear grossly intact. Skin: Skin is not cold. Not diaphoretic. No erythema. Psychiatric:  Grossly normal mood and affect. Intact insight.     Objective Data:    [facility] records Cath 8/2013 3 V CAD, EF 45-50%  RCA: prox 30-40% instent restenosis of BMS. Distal BMS 30 instent restenosis     CABG at [facility] 8/2013    Cardiac cath 2018: severe 3V disease  Patent venous graft to PDA and LILMA graft to LAD  Successful PCI of prox Lcx into OM2 w BILLY    Echo 2018 LV 55% cLVH basal inferior and inferolateral wall hypokinesis. Mild RV dysfunction. No sig valve disease    Hx of lung nodule per CT 8/2013 5 MM JEREMY. 3 mm RLL. Hx of pneumothorax w chest tube placement s/p fall    6/2018- 3.6 cm left renal mass highly suspicious of RCC    11/23/20   ECHO ADULT COMPLETE 11/24/2020 11/24/2020    Narrative · LV: Estimated LVEF is 20 - 25%. Mildly dilated left ventricle. Upper   normal wall thickness. Severely reduced systolic function. Left   ventricular diastolic dysfunction. The findings are consistent with   ischemic cardiomyopathy. · RV: Moderately reduced systolic function. · LA: Moderately dilated left atrium. · RA: Mildly dilated right atrium. · AV: Sclerosis with reduced excursion, no stenosis. · MV: Mitral valve leaflet calcification. Mild mitral annular   calcification. Moderate mitral valve regurgitation is present. · TV: Mild to moderate tricuspid valve regurgitation is present. · PA: Mild pulmonary hypertension. Pulmonary arterial systolic pressure is   40 mmHg. · IVC: Moderately elevated central venous pressure (10-15 mmHg); IVC   diameter is larger than 21 mm and collapses more than 50% with   respiration. Signed by: DO Avery Angulo DO          ATTENTION:   This medical record was transcribed using an electronic medical records/speech recognition system. Although proofread, it may and can contain electronic, spelling and other errors. Corrections may be executed at a later time. Please feel free to contact us for any clarifications as needed.

## 2020-12-18 NOTE — PROGRESS NOTES
Chief Complaint   Patient presents with   [facility] Follow Up     Patient presents today for a hospital follow up with AFIB, CHF    Irregular Heart Beat    CHF         Visit Vitals  BP (!) 170/100 (BP 1 Location: Left arm, BP Patient Position: Sitting)   Pulse 69   Ht 5' 9\" (1.753 m)   Wt 182 lb (82.6 kg)   SpO2 97%   BMI 26.88 kg/m²       Chest pain denied  SOB - some with activity  Dizziness denied  Swelling/Edema - denied  Refills denied

## 2020-12-18 NOTE — H&P (VIEW-ONLY)
[facility] [address], [address] [place], [address] [facility] Office [phone] Kyung Severe is a 64 y.o. male presents for hospital f/up for HFrEF. Assessment/Recommendations: 
 
 
HFrEF- ischemic in etiology. No angina symptoms. Likely will need repeat R/LHC, deferred during recent hospitalization due to renal dysfunction - Titrate Coreg to 25 mg twice daily - Add Entresto step 1 
- cont bumex 1mg daily - ICD pending LVEF 
- Nurse practitioner follow-up in 2 weeks for medication titration. Recommend patient repeat basic metabolic panel and NT proBNP few days prior. 
- If his renal function remains stable, recommend we proceed with left heart catheterization. Hypertensionelevated in the office today. Medication titration per above CAD- hx of cabg and pci. Cont asa 81mg, atorvastatin 80mg daily Aflutter- sinus rhythm, continue amiodarone and eliquis. Consider AFlutter ablation pending clinical course. Functional MR- moderate MR on echo 11/20 CKD, cardiorenal syndrome Left renal mass, and flank pain-encourage patient to schedule follow-up visit with urology Tobacco abuse Primary Care Physician- Michael Mukherjee MD 
 
Follow-up 2-3 weeks Subjective: 
64 y.o. presents to office f/up of recent hospitalization. Since last visit he continues to do well. He reports class II dyspnea symptoms. He reports his blood pressure at home has been 100 3140 mmHg. Blood pressure severely elevated in the office today at 164/96. He reports ongoing robust urine response to diuretic therapy. Recent NT proBNP decreased from [phone]. Creatinine remained stable at 1.52. No adverse bleeding with DOAC. Past Medical History:  
Diagnosis Date  CAD (coronary artery disease)  Hypertension Past Surgical History:  
Procedure Laterality Date  CARDIAC SURG PROCEDURE UNLIST stents Current Outpatient Medications:  
  carvediloL (COREG) 25 mg tablet, Take 1 Tab by mouth two (2) times daily (with meals). , Disp: 60 Tab, Rfl: 3 
  sacubitriL-valsartan (Entresto) 24-26 mg tablet, Take 1 Tab by mouth two (2) times a day., Disp: 60 Tab, Rfl: 3 
  amiodarone (CORDARONE) 200 mg tablet, Take 1 Tab by mouth daily. , Disp: 30 Tab, Rfl: 2 
  apixaban (ELIQUIS) 5 mg tablet, Take 1 Tab by mouth two (2) times a day., Disp: 60 Tab, Rfl: 5 
  bumetanide (BUMEX) 1 mg tablet, Take 1 Tab by mouth daily. , Disp: 30 Tab, Rfl: 2 
  atorvastatin (LIPITOR) 80 mg tablet, Take 80 mg by mouth daily. , Disp: , Rfl:  
  aspirin 81 mg chewable tablet, Take 81 mg by mouth daily. , Disp: , Rfl:  
 
No Known Allergies No family history on file. Social History Tobacco Use  Smoking status: Current Every Day Smoker  Smokeless tobacco: Former User Substance Use Topics  Alcohol use: Not Currently  Drug use: No  
 
 
Review of Symptoms: 
Pertinent Positive: improved dyspnea Pertinent Negative: No chest pain, orthopnea, PND All Other systems reviewed and are negative for a Comprehensive ROS (10+) Physical Exam 
 
Blood pressure (!) 170/100, pulse 69, height 5' 9\" (1.753 m), weight 182 lb (82.6 kg), SpO2 97 %. Constitutional:  well-developed and well-nourished. No distress. HENT: Normocephalic. Eyes: No scleral icterus. Neck:  Neck supple. No JVD present. Pulmonary/Chest: Effort normal and breath sounds normal. No respiratory distress, wheezes or rales. Cardiovascular: Normal rate, regular rhythm, S1 S2 . Exam reveals no gallop and no friction rub. No murmur heard. No edema. Extremities:  Normal muscle tone Abdominal:   No abnormal distension. Neurological:  Moving all extremities, cranial nerves appear grossly intact. Skin: Skin is not cold. Not diaphoretic. No erythema. Psychiatric:  Grossly normal mood and affect. Intact insight.  
 
Objective Data: 
 
 [facility] records Cath 8/2013 3 V CAD, EF 45-50% RCA: prox 30-40% instent restenosis of BMS. Distal BMS 30 instent restenosis CABG at [facility] 8/2013 Cardiac cath 2018: severe 3V disease Patent venous graft to PDA and LILMA graft to LAD Successful PCI of prox Lcx into OM2 w BILLY Echo 2018 LV 55% cLVH basal inferior and inferolateral wall hypokinesis. Mild RV dysfunction. No sig valve disease Hx of lung nodule per CT 8/2013 5 MM JEREMY. 3 mm RLL. Hx of pneumothorax w chest tube placement s/p fall 6/2018- 3.6 cm left renal mass highly suspicious of RCC 
 
11/23/20 ECHO ADULT COMPLETE 11/24/2020 11/24/2020 Narrative · LV: Estimated LVEF is 20 - 25%. Mildly dilated left ventricle. Upper  
normal wall thickness. Severely reduced systolic function. Left  
ventricular diastolic dysfunction. The findings are consistent with  
ischemic cardiomyopathy. · RV: Moderately reduced systolic function. · LA: Moderately dilated left atrium. · RA: Mildly dilated right atrium. · AV: Sclerosis with reduced excursion, no stenosis. · MV: Mitral valve leaflet calcification. Mild mitral annular  
calcification. Moderate mitral valve regurgitation is present. · TV: Mild to moderate tricuspid valve regurgitation is present. · PA: Mild pulmonary hypertension. Pulmonary arterial systolic pressure is 40 mmHg. · IVC: Moderately elevated central venous pressure (10-15 mmHg); IVC  
diameter is larger than 21 mm and collapses more than 50% with  
respiration. Signed by: DO Delores Birmingham DO 
 
 
 
 
ATTENTION:  
This medical record was transcribed using an electronic medical records/speech recognition system. Although proofread, it may and can contain electronic, spelling and other errors. Corrections may be executed at a later time. Please feel free to contact us for any clarifications as needed.

## 2020-12-30 LAB
BUN SERPL-MCNC: 29 MG/DL (ref 6–24)
BUN/CREAT SERPL: 18 (ref 9–20)
CALCIUM SERPL-MCNC: 9.1 MG/DL (ref 8.7–10.2)
CHLORIDE SERPL-SCNC: 102 MMOL/L (ref 96–106)
CO2 SERPL-SCNC: 23 MMOL/L (ref 20–29)
CREAT SERPL-MCNC: 1.65 MG/DL (ref 0.76–1.27)
GLUCOSE SERPL-MCNC: 88 MG/DL (ref 65–99)
INTERPRETATION: NORMAL
NT-PROBNP SERPL-MCNC: 1608 PG/ML (ref 0–210)
POTASSIUM SERPL-SCNC: 4.3 MMOL/L (ref 3.5–5.2)
SODIUM SERPL-SCNC: 139 MMOL/L (ref 134–144)

## 2021-01-05 ENCOUNTER — OFFICE VISIT (OUTPATIENT)
Dept: CARDIOLOGY CLINIC | Age: 61
End: 2021-01-05

## 2021-01-05 ENCOUNTER — DOCUMENTATION ONLY (OUTPATIENT)
Dept: CARDIOLOGY CLINIC | Age: 61
End: 2021-01-05

## 2021-01-05 VITALS
HEIGHT: 69 IN | WEIGHT: 179 LBS | HEART RATE: 59 BPM | DIASTOLIC BLOOD PRESSURE: 106 MMHG | SYSTOLIC BLOOD PRESSURE: 170 MMHG | BODY MASS INDEX: 26.51 KG/M2 | OXYGEN SATURATION: 99 %

## 2021-01-05 DIAGNOSIS — I50.9 ACUTE CONGESTIVE HEART FAILURE, UNSPECIFIED HEART FAILURE TYPE (HCC): Primary | ICD-10-CM

## 2021-01-05 DIAGNOSIS — N18.9 CHRONIC KIDNEY DISEASE, UNSPECIFIED CKD STAGE: ICD-10-CM

## 2021-01-05 DIAGNOSIS — I48.92 ATRIAL FIBRILLATION AND FLUTTER (HCC): ICD-10-CM

## 2021-01-05 DIAGNOSIS — I25.10 CORONARY ARTERY DISEASE INVOLVING NATIVE CORONARY ARTERY OF NATIVE HEART WITHOUT ANGINA PECTORIS: ICD-10-CM

## 2021-01-05 DIAGNOSIS — I50.20 HFREF (HEART FAILURE WITH REDUCED EJECTION FRACTION) (HCC): Primary | ICD-10-CM

## 2021-01-05 DIAGNOSIS — Z72.0 TOBACCO USE: ICD-10-CM

## 2021-01-05 DIAGNOSIS — I10 ESSENTIAL HYPERTENSION: ICD-10-CM

## 2021-01-05 DIAGNOSIS — I48.91 ATRIAL FIBRILLATION AND FLUTTER (HCC): ICD-10-CM

## 2021-01-05 PROCEDURE — 99213 OFFICE O/P EST LOW 20 MIN: CPT | Performed by: NURSE PRACTITIONER

## 2021-01-05 RX ORDER — SACUBITRIL AND VALSARTAN 49; 51 MG/1; MG/1
1 TABLET, FILM COATED ORAL 2 TIMES DAILY
Qty: 60 TAB | Refills: 0 | Status: SHIPPED | OUTPATIENT
Start: 2021-01-05 | End: 2021-03-19 | Stop reason: ALTCHOICE

## 2021-01-05 NOTE — PROGRESS NOTES
Divine Langston is a 64 y.o. male    Visit Vitals  BP (!) 170/106 (BP 1 Location: Left arm, BP Patient Position: Sitting)   Pulse (!) 59   Ht 5' 9\" (1.753 m)   Wt 179 lb (81.2 kg)   SpO2 99%   BMI 26.43 kg/m²       Chief Complaint   Patient presents with    CHF    Hypertension    Coronary Artery Disease    Irregular Heart Beat     AFIB       Chest pain NO  SOB NO  Dizziness NO  Swelling NO  Recent hospital visit NO  Refills NO

## 2021-01-05 NOTE — PATIENT INSTRUCTIONS
Please confirm you are taking correct meds at home - keep BP log twice daily x2 weeks Increase Entresto to 49/51mg - twice daily I will set you up for heart catheterization (left and right) See me again in 2 weeks - get labs prior.

## 2021-01-05 NOTE — PROGRESS NOTES
JASMYN Gunn  [address]  [place], [address] [facility]    Office [phone]  Fax [phone]          Kyung Severe is a 64 y.o. male presents for fu of HFrEF. Assessment/Recommendations:  HFrEF- ischemic in etiology. No angina symptoms. Plan for [facility] in near future- deferred during recent hospitalization due to renal dysfunction. Cr now improved. - vol appears stable  - cont Coreg to 25 mg twice daily  - labs stable 12/29/20 - titrate Entresto to step 2  - cont bumex 1mg daily  - ICD pending LVEF in 3 to 4 mo    Hypertensionelevated in the office. Medication titration per above. Pt advised to review med list at home - ensure taking everything correctly    CAD- hx of cabg and pci. Cont asa 81mg, atorvastatin 80mg daily. LHC per above. No anginal c/o    Aflutter- continue amiodarone and eliquis. Consider Aflutter ablation pending clinical course. Functional MR- moderate MR on echo 11/20    CKD, cardiorenal syndrome    Left renal mass, and flank pain-encourage patient to schedule follow-up visit with urology. Did not discuss today. Tobacco abuse - only smoking 3-4 cigs per day with goal to quit. Primary Care Physician- Michael Mukherjee MD    Follow-up 2-3 weeks    Subjective:  64 y.o. presents to office fu of CHF / med titration. Pt reports breathing is stable / overall improved. Denies CP. Continues to smoke 3-4 cigs per day. Denies alcohol / drug use. Taking all meds as rx - no bleeding issues. Patient denies any exertional chest pain, dyspnea, palpitations, syncope, orthopnea, edema, or paroxysmal nocturnal dyspnea.         Past Medical History:   Diagnosis Date    CAD (coronary artery disease)     Hypertension         Past Surgical History:   Procedure Laterality Date    FL CARDIAC SURG PROCEDURE UNLIST      stents         Current Outpatient Medications:     carvediloL (COREG) 25 mg tablet, Take 1 Tab by mouth two (2) times daily (with meals). , Disp: 60 Tab, Rfl: 3    sacubitriL-valsartan (Entresto) 24-26 mg tablet, Take 1 Tab by mouth two (2) times a day., Disp: 60 Tab, Rfl: 3    amiodarone (CORDARONE) 200 mg tablet, Take 1 Tab by mouth daily. , Disp: 30 Tab, Rfl: 2    apixaban (ELIQUIS) 5 mg tablet, Take 1 Tab by mouth two (2) times a day., Disp: 60 Tab, Rfl: 5    bumetanide (BUMEX) 1 mg tablet, Take 1 Tab by mouth daily. , Disp: 30 Tab, Rfl: 2    atorvastatin (LIPITOR) 80 mg tablet, Take 80 mg by mouth daily. , Disp: , Rfl:     aspirin 81 mg chewable tablet, Take 81 mg by mouth daily. , Disp: , Rfl:     No Known Allergies     No family history on file. Social History     Tobacco Use    Smoking status: Current Every Day Smoker    Smokeless tobacco: Former User   Substance Use Topics    Alcohol use: Not Currently    Drug use: No       Review of Symptoms:  Pertinent Positive: improved dyspnea  Pertinent Negative: No chest pain, orthopnea, PND    All Other systems reviewed and are negative for a Comprehensive ROS (10+)    Physical Exam    Blood pressure (!) 170/106, pulse (!) 59, height 5' 9\" (1.753 m), weight 179 lb (81.2 kg), SpO2 99 %. Repeat /98    General - well developed well nourished  Neck - JVP normal,   Cardiac - normal S1, S2, no murmurs, rubs or gallops. No clicks  Vascular - carotids without bruits, radials and pedal pulses equal bilateral  Lungs - clear to auscultation bilaterals, no rales, wheezing or rhonchi  Abd - soft nontender, non-distended, +BS  Extremities - no edema, warm, well perfused  Neuro - nonfocal  Psych - normal mood and affect    Objective Data:    [facility] records     Cath 8/2013 3 V CAD, EF 45-50%  RCA: prox 30-40% instent restenosis of BMS.  Distal BMS 30 instent restenosis     CABG at [facility] 8/2013    Cardiac cath 2018: severe 3V disease  Patent venous graft to PDA and LILMA graft to LAD  Successful PCI of prox Lcx into OM2 w BILLY    Echo 2018 LV 55% cLVH basal inferior and inferolateral wall hypokinesis. Mild RV dysfunction. No sig valve disease    Hx of lung nodule per CT 8/2013 5 MM JEREMY. 3 mm RLL. Hx of pneumothorax w chest tube placement s/p fall    6/2018- 3.6 cm left renal mass highly suspicious of RCC    11/23/20   ECHO ADULT COMPLETE 11/24/2020 11/24/2020    Narrative · LV: Estimated LVEF is 20 - 25%. Mildly dilated left ventricle. Upper   normal wall thickness. Severely reduced systolic function. Left   ventricular diastolic dysfunction. The findings are consistent with   ischemic cardiomyopathy. · RV: Moderately reduced systolic function. · LA: Moderately dilated left atrium. · RA: Mildly dilated right atrium. · AV: Sclerosis with reduced excursion, no stenosis. · MV: Mitral valve leaflet calcification. Mild mitral annular   calcification. Moderate mitral valve regurgitation is present. · TV: Mild to moderate tricuspid valve regurgitation is present. · PA: Mild pulmonary hypertension. Pulmonary arterial systolic pressure is   40 mmHg. · IVC: Moderately elevated central venous pressure (10-15 mmHg); IVC   diameter is larger than 21 mm and collapses more than 50% with   respiration.         Signed by: DO Margaret Abad, ANP

## 2021-01-05 NOTE — PROGRESS NOTES
Reviewed necessary information with patient for [facility] scheduled for Thursday, January 14th at 9:15 am.    Patient verbalized understanding.

## 2021-01-13 DIAGNOSIS — I50.42 CHRONIC COMBINED SYSTOLIC AND DIASTOLIC HEART FAILURE (HCC): Primary | ICD-10-CM

## 2021-01-13 RX ORDER — SODIUM CHLORIDE 9 MG/ML
100 INJECTION, SOLUTION INTRAVENOUS CONTINUOUS
Status: CANCELLED | OUTPATIENT
Start: 2021-01-14

## 2021-01-13 RX ORDER — SODIUM CHLORIDE 0.9 % (FLUSH) 0.9 %
5-40 SYRINGE (ML) INJECTION AS NEEDED
Status: CANCELLED | OUTPATIENT
Start: 2021-01-14

## 2021-01-13 RX ORDER — SODIUM CHLORIDE 0.9 % (FLUSH) 0.9 %
5-40 SYRINGE (ML) INJECTION EVERY 8 HOURS
Status: CANCELLED | OUTPATIENT
Start: 2021-01-14

## 2021-01-13 NOTE — PROGRESS NOTES
2:01 PM    Attempted to call and speak with patient to verify arrival time of 0630, to remain NPO after midnight, and  for transportation home. No answer just screening answering service. Left message with name, above instructions  and call back number.

## 2021-01-14 ENCOUNTER — HOSPITAL ENCOUNTER (OUTPATIENT)
Age: 61
Setting detail: OUTPATIENT SURGERY
Discharge: HOME | End: 2021-01-14
Attending: STUDENT IN AN ORGANIZED HEALTH CARE EDUCATION/TRAINING PROGRAM | Admitting: STUDENT IN AN ORGANIZED HEALTH CARE EDUCATION/TRAINING PROGRAM
Payer: MEDICAID

## 2021-01-14 VITALS
HEART RATE: 49 BPM | DIASTOLIC BLOOD PRESSURE: 91 MMHG | BODY MASS INDEX: 26.24 KG/M2 | TEMPERATURE: 97.5 F | RESPIRATION RATE: 16 BRPM | WEIGHT: 177.19 LBS | OXYGEN SATURATION: 99 % | SYSTOLIC BLOOD PRESSURE: 144 MMHG | HEIGHT: 69 IN

## 2021-01-14 DIAGNOSIS — I50.42 CHRONIC COMBINED SYSTOLIC AND DIASTOLIC HEART FAILURE (HCC): ICD-10-CM

## 2021-01-14 DIAGNOSIS — I50.42 HEART FAILURE, SYSTOLIC AND DIASTOLIC, CHRONIC (HCC): ICD-10-CM

## 2021-01-14 LAB
ANION GAP SERPL CALC-SCNC: 5 MMOL/L (ref 5–15)
BUN SERPL-MCNC: 21 MG/DL (ref 6–24)
BUN SERPL-MCNC: 25 MG/DL (ref 6–20)
BUN/CREAT SERPL: 15 (ref 12–20)
BUN/CREAT SERPL: 15 (ref 9–20)
CALCIUM SERPL-MCNC: 8.7 MG/DL (ref 8.5–10.1)
CALCIUM SERPL-MCNC: 9.5 MG/DL (ref 8.7–10.2)
CHLORIDE SERPL-SCNC: 100 MMOL/L (ref 96–106)
CHLORIDE SERPL-SCNC: 103 MMOL/L (ref 97–108)
CO2 SERPL-SCNC: 26 MMOL/L (ref 20–29)
CO2 SERPL-SCNC: 30 MMOL/L (ref 21–32)
CREAT SERPL-MCNC: 1.43 MG/DL (ref 0.76–1.27)
CREAT SERPL-MCNC: 1.69 MG/DL (ref 0.7–1.3)
ERYTHROCYTE [DISTWIDTH] IN BLOOD BY AUTOMATED COUNT: 13.2 % (ref 11.5–14.5)
GLUCOSE SERPL-MCNC: 79 MG/DL (ref 65–99)
GLUCOSE SERPL-MCNC: 86 MG/DL (ref 65–100)
HCT VFR BLD AUTO: 49.5 % (ref 36.6–50.3)
HGB BLD-MCNC: 16.7 G/DL (ref 12.1–17)
INTERPRETATION: NORMAL
MCH RBC QN AUTO: 31.2 PG (ref 26–34)
MCHC RBC AUTO-ENTMCNC: 33.7 G/DL (ref 30–36.5)
MCV RBC AUTO: 92.4 FL (ref 80–99)
NRBC # BLD: 0 K/UL (ref 0–0.01)
NRBC BLD-RTO: 0 PER 100 WBC
PLATELET # BLD AUTO: 229 K/UL (ref 150–400)
PMV BLD AUTO: 9.9 FL (ref 8.9–12.9)
POTASSIUM SERPL-SCNC: 3.6 MMOL/L (ref 3.5–5.1)
POTASSIUM SERPL-SCNC: 3.7 MMOL/L (ref 3.5–5.2)
RBC # BLD AUTO: 5.36 M/UL (ref 4.1–5.7)
SODIUM SERPL-SCNC: 138 MMOL/L (ref 136–145)
SODIUM SERPL-SCNC: 139 MMOL/L (ref 134–144)
WBC # BLD AUTO: 8 K/UL (ref 4.1–11.1)

## 2021-01-14 PROCEDURE — 93567 NJX CAR CTH SPRVLV AORTGRPHY: CPT | Performed by: STUDENT IN AN ORGANIZED HEALTH CARE EDUCATION/TRAINING PROGRAM

## 2021-01-14 PROCEDURE — 85027 COMPLETE CBC AUTOMATED: CPT

## 2021-01-14 PROCEDURE — 99153 MOD SED SAME PHYS/QHP EA: CPT | Performed by: STUDENT IN AN ORGANIZED HEALTH CARE EDUCATION/TRAINING PROGRAM

## 2021-01-14 PROCEDURE — C1894 INTRO/SHEATH, NON-LASER: HCPCS | Performed by: STUDENT IN AN ORGANIZED HEALTH CARE EDUCATION/TRAINING PROGRAM

## 2021-01-14 PROCEDURE — 77030008543 HC TBNG MON PRSS MRTM -A: Performed by: STUDENT IN AN ORGANIZED HEALTH CARE EDUCATION/TRAINING PROGRAM

## 2021-01-14 PROCEDURE — 99152 MOD SED SAME PHYS/QHP 5/>YRS: CPT | Performed by: STUDENT IN AN ORGANIZED HEALTH CARE EDUCATION/TRAINING PROGRAM

## 2021-01-14 PROCEDURE — 36415 COLL VENOUS BLD VENIPUNCTURE: CPT

## 2021-01-14 PROCEDURE — 82810 BLOOD GASES O2 SAT ONLY: CPT | Performed by: STUDENT IN AN ORGANIZED HEALTH CARE EDUCATION/TRAINING PROGRAM

## 2021-01-14 PROCEDURE — 74011000250 HC RX REV CODE- 250: Performed by: STUDENT IN AN ORGANIZED HEALTH CARE EDUCATION/TRAINING PROGRAM

## 2021-01-14 PROCEDURE — C1751 CATH, INF, PER/CENT/MIDLINE: HCPCS | Performed by: STUDENT IN AN ORGANIZED HEALTH CARE EDUCATION/TRAINING PROGRAM

## 2021-01-14 PROCEDURE — 93461 R&L HRT ART/VENTRICLE ANGIO: CPT | Performed by: STUDENT IN AN ORGANIZED HEALTH CARE EDUCATION/TRAINING PROGRAM

## 2021-01-14 PROCEDURE — 77030029065 HC DRSG HEMO QCLOT ZMED -B: Performed by: STUDENT IN AN ORGANIZED HEALTH CARE EDUCATION/TRAINING PROGRAM

## 2021-01-14 PROCEDURE — 80048 BASIC METABOLIC PNL TOTAL CA: CPT

## 2021-01-14 PROCEDURE — 74011250636 HC RX REV CODE- 250/636: Performed by: STUDENT IN AN ORGANIZED HEALTH CARE EDUCATION/TRAINING PROGRAM

## 2021-01-14 PROCEDURE — C1887 CATHETER, GUIDING: HCPCS | Performed by: STUDENT IN AN ORGANIZED HEALTH CARE EDUCATION/TRAINING PROGRAM

## 2021-01-14 PROCEDURE — 77030019569 HC BND COMPR RAD TERU -B: Performed by: STUDENT IN AN ORGANIZED HEALTH CARE EDUCATION/TRAINING PROGRAM

## 2021-01-14 PROCEDURE — 74011000636 HC RX REV CODE- 636: Performed by: STUDENT IN AN ORGANIZED HEALTH CARE EDUCATION/TRAINING PROGRAM

## 2021-01-14 PROCEDURE — 77030004532 HC CATH ANGI DX IMP BSC -A: Performed by: STUDENT IN AN ORGANIZED HEALTH CARE EDUCATION/TRAINING PROGRAM

## 2021-01-14 PROCEDURE — 77030029065 HC DRSG HEMO QCLOT ZMED -B

## 2021-01-14 RX ORDER — SODIUM CHLORIDE 9 MG/ML
100 INJECTION, SOLUTION INTRAVENOUS CONTINUOUS
Status: DISCONTINUED | OUTPATIENT
Start: 2021-01-14 | End: 2021-01-14 | Stop reason: HOSPADM

## 2021-01-14 RX ORDER — MIDAZOLAM HYDROCHLORIDE 1 MG/ML
INJECTION, SOLUTION INTRAMUSCULAR; INTRAVENOUS AS NEEDED
Status: DISCONTINUED | OUTPATIENT
Start: 2021-01-14 | End: 2021-01-14 | Stop reason: HOSPADM

## 2021-01-14 RX ORDER — SODIUM CHLORIDE 9 MG/ML
200 INJECTION, SOLUTION INTRAVENOUS CONTINUOUS
Status: DISPENSED | OUTPATIENT
Start: 2021-01-14 | End: 2021-01-14

## 2021-01-14 RX ORDER — SPIRONOLACTONE 25 MG/1
25 TABLET ORAL DAILY
Qty: 30 TAB | Refills: 5 | Status: SHIPPED | OUTPATIENT
Start: 2021-01-14

## 2021-01-14 RX ORDER — SODIUM CHLORIDE 0.9 % (FLUSH) 0.9 %
5-40 SYRINGE (ML) INJECTION EVERY 8 HOURS
Status: DISCONTINUED | OUTPATIENT
Start: 2021-01-14 | End: 2021-01-14 | Stop reason: HOSPADM

## 2021-01-14 RX ORDER — SODIUM CHLORIDE 0.9 % (FLUSH) 0.9 %
5-40 SYRINGE (ML) INJECTION AS NEEDED
Status: DISCONTINUED | OUTPATIENT
Start: 2021-01-14 | End: 2021-01-14 | Stop reason: HOSPADM

## 2021-01-14 RX ORDER — HEPARIN SODIUM 200 [USP'U]/100ML
INJECTION, SOLUTION INTRAVENOUS
Status: COMPLETED | OUTPATIENT
Start: 2021-01-14 | End: 2021-01-14

## 2021-01-14 RX ORDER — FENTANYL CITRATE 50 UG/ML
INJECTION, SOLUTION INTRAMUSCULAR; INTRAVENOUS AS NEEDED
Status: DISCONTINUED | OUTPATIENT
Start: 2021-01-14 | End: 2021-01-14 | Stop reason: HOSPADM

## 2021-01-14 RX ORDER — LIDOCAINE HYDROCHLORIDE 10 MG/ML
INJECTION INFILTRATION; PERINEURAL AS NEEDED
Status: DISCONTINUED | OUTPATIENT
Start: 2021-01-14 | End: 2021-01-14 | Stop reason: HOSPADM

## 2021-01-14 RX ORDER — HEPARIN SODIUM 1000 [USP'U]/ML
INJECTION, SOLUTION INTRAVENOUS; SUBCUTANEOUS AS NEEDED
Status: DISCONTINUED | OUTPATIENT
Start: 2021-01-14 | End: 2021-01-14 | Stop reason: HOSPADM

## 2021-01-14 RX ORDER — VERAPAMIL HYDROCHLORIDE 2.5 MG/ML
INJECTION, SOLUTION INTRAVENOUS AS NEEDED
Status: DISCONTINUED | OUTPATIENT
Start: 2021-01-14 | End: 2021-01-14 | Stop reason: HOSPADM

## 2021-01-14 NOTE — PROCEDURES
BRIEF PROCEDURE NOTE    Date of Procedure: 1/14/2021   Preoperative Diagnosis: cardiomyopathy, cad  Postoperative Diagnosis: same    Procedure: Left and right heart cath, coronary angiography w/ bypass angiography, aortogram  Interventional Cardiologist: Erik Maria DO  Assistant: None  Anesthesia: local + IV moderate sedation   I administered moderate sedation throughout this procedure. An independent trained observer pushed medications at my direction, and monitored the patients level of consciousness and physiological status throughout. Estimated Blood Loss: Minimal    Access: right radial artery, 6F  Catheters:  Left coronary:unable to selectively engage, sub-selective with convey LBU3.5 (attempted jl 4, jl3.5, al1)  Right coronary:jr4 5F  Lima: IM 5F  SVG to RCA: unable to engage with jr4, al1, rcb    Findings:   L Main: large caliber, widely patent  LAD: proximal , mid and distal vessel fill via lima, distal and apical lad diminutive in size, D1 small caliber with severe diffuse disease  LCx: large caliber, AV groove stent widely patent, moderate om1 with diffuse mild disease, AV groove lcx into OM2 with patent proximal stent and diffuse mild to moderate disease within distal branching vessel  RCA:  within proximal stent, no collaterals, SVG to distal RCA likely is patent  [address]: widely patent  SVG to RCA: very faintly seen filling on aortogram, no collaterals visualized to RCA, suspect patent    LVEDP:  12 mmhg      RHC findings:    RAP=    5  mmHg  RVSP=  40/5  mmHg  PAP=     36/12/20  mmHg  PCWP=  8  mmHg  CO=       Rasheed 5.11, thermal 4.4  L/min  CI=    Rasheed 2.56, thermal  2.2  L/min/m2     Specimens Removed: None    Implants: n/a    Closure Device: radial TR band    See full cath note. Complications: none      Findings:  1. Multivessel CAD with  of native LAD and RCA  2. Patent AV groove into OM2 stent  3. Patent LIMA  4. Suspect svg to rca is patent, but not well visualized  5.  Normal right and left sided filling presssure  6. Mildly reduced cardiac index    Plan:    D/C amiodarone.   Add spironolactone  Consider holding bumex in th future pending clinical course    Praveena Benjamin, DO Amelia Souza, DO  [facility] [address] [place] [address]                                              Office [phone],[facility] [phone]

## 2021-01-14 NOTE — INTERVAL H&P NOTE
Update History & Physical 
 
History and physical has been reviewed. The patient has been examined. There have been no significant clinical changes since the completion of the originally dated History and Physical. 
 
Risk and benefit of cardiac catheterization/PCI was described in detail to patient.  (risk of vascular access complication with potential surgical intervention for management, stroke, myocardial infarction, emergent open heart surgery and death). Patient wishes to proceed with coronary angiography with possible intervention. Labs Lab Results Component Value Date/Time Sodium 139 12/29/2020 11:03 AM  
 Potassium 4.3 12/29/2020 11:03 AM  
 Chloride 102 12/29/2020 11:03 AM  
 CO2 23 12/29/2020 11:03 AM  
 Anion gap 5 11/30/2020 09:03 AM  
 Glucose 88 12/29/2020 11:03 AM  
 BUN 29 (H) 12/29/2020 11:03 AM  
 Creatinine 1.65 (H) 12/29/2020 11:03 AM  
 BUN/Creatinine ratio 18 12/29/2020 11:03 AM  
 GFR est AA 52 (L) 12/29/2020 11:03 AM  
 GFR est non-AA 45 (L) 12/29/2020 11:03 AM  
 Calcium 9.1 12/29/2020 11:03 AM  
 
Lab Results Component Value Date/Time WBC 10.9 11/30/2020 09:03 AM  
 HGB 13.7 11/30/2020 09:03 AM  
 HCT 40.7 11/30/2020 09:03 AM  
 PLATELET 557 44/46/1113 09:03 AM  
 MCV 94.0 11/30/2020 09:03 AM  
 
 
 
ASA 3 Airway 2 Electronically signed by Erik Maria DO on 1/14/2021 at 8:04 AM

## 2021-01-14 NOTE — DISCHARGE INSTRUCTIONS
Transradial Cardiac Catheterization/Angiography Discharge Instructions    It is normal to feel tired the first couple days. Take it easy and follow the physicians instructions. Wear wrist splint for first 24 hours to keep the wrist stable. No repetitive flexing of wrist.       CHECK THE CATHETER INSERTION SITE DAILY:  Remove the wrist dressing 24 hours after the procedure. You may shower 24 hours after the procedure. Wash with soap and water and pat dry. Gentle cleaning of the site with soap and water is sufficient, cover with a dry clean dressing or bandage. Do not apply creams or powders to the area. No soaking the wrist for 3 days. Leave the puncture site open to air after 24 hours post-procedure. CALL THE PHYSICIANS:   If you have signs of infection, such as:            - A fever  If the site becomes red, swollen or feels warm to the touch  If there is drainage or if there is unusual pain at the radial site. MONITOR FOR BLEEDING:    If there is any minor oozing, you may apply a band-aid and remove after 12 hours. If the bleeding continues or if there is a lot of bleeding hold pressure with the middle finger against the puncture site and the thumb against the back of the wrist,call 911 to be transported to the hospital.  DO NOT DRIVE YOURSELF, [employer] [address]. ACTIVITY:   For the first 24 hours do not manipulate the wrist.  No lifting, pushing or pulling over 3-5 pounds with the affected wrist for 7 daysand no straining the insertion site. Do not life grocery bags or the garbage can, do not run the vacuum  or  for 7 days. Start with short walks as in the hospital and gradually increase as tolerated each day. It is recommended to walk 30 minutes 5-7 days per week. Follow your physicians instructions on activity. Avoid walking outside in extremes of heat or cold. Walk inside when it is cold and windy or hot and humid.      Things to keep in mind:  No driving for at least 24 hours, or as designated by your physician. Limit the number of times you go up and down the stairs  Take rests and pace yourself with activity. Be careful and do not strain with bowel movements. Diet:  Drink plenty of fluids for the next 24 - 48 hours to help your body flush out the dye. If you have kidney, heart, or liver disease and have to limit fluids, talk with your doctor before you increase the amount of fluids you drink. Keep eating a heart-healthy, low-fat diet that has lots of fruits, vegetables, and whole grains. [address]

## 2021-01-14 NOTE — Clinical Note
TRANSFER - OUT REPORT:     Verbal report given to: Hong John. Report consisted of patient's Situation, Background, Assessment and   Recommendations(SBAR). Opportunity for questions and clarification was provided. Patient transported with a Registered Nurse. Patient transported to: [facility].

## 2021-01-14 NOTE — Clinical Note
aortic root obtained using power injection. Total volume = 30 mL. Rate = 10 mL/sec. Pressure = 900 PSI. Rate of rise = 0.5 sec.

## 2021-01-14 NOTE — PROGRESS NOTES
Patient arrived. ID and allergies verified verbally with patient. Pt voices understanding of procedure to be performed. Consent obtained. Pt prepped for procedure. 0830    TRANSFER - OUT REPORT:    Verbal report given to Deliliah Councilman, RT (name) on Florence Hoffman  being transferred to [address] (unit) for ordered procedure       Report consisted of patients Situation, Background, Assessment and   Recommendations(SBAR). Information from the following report(s) SBAR was reviewed with the receiving nurse. Lines:   Peripheral IV 01/14/21 Right Antecubital (Active)        Opportunity for questions and clarification was provided. Patient transported with:   Registered Nurse        2324    TRANSFER - IN REPORT:    Verbal report received from [facility], [address] [facility] (name) on Florence Hoffman  being received from [address] [facility] (unit) for routine progression of care      Report consisted of patients Situation, Background, Assessment and   Recommendations(SBAR). Information from the following report(s) SBAR was reviewed with the receiving nurse. Opportunity for questions and clarification was provided. Assessment completed upon patients arrival to unit and care assumed. 10:05 AM    See MAR bolus initiated per MD during procedure  LUE elevated on pillow per order  Patient tolerating sips  Denies pain  monitoring    10:12 AM    MD at bedside reviewing findings with patient      1100    2 ml air released from TR Band. Profuse bleeding observed  3ml air returned to TR Band immediately. Bleeding stopped. Monitoring      11:15 AM    Patient's girlfriend Jeffy Gonzales updated. 1125    2 ml air released from TR Band. No bleeding or hematoma noted. Radial and Ulnar pulse on LEFT wrist palpable. Pt tolerated well. Will continue to monitor. 1130    2 ml air released from TR Band. No bleeding or hematoma noted. Radial and Ulnar pulse on LEFT wrist palpable. Pt tolerated well. Will continue to monitor.       Patient awake, but drowsy speaking with MD    2 ml air released from TR Band. No bleeding or hematoma noted. Radial and Ulnar pulse on LEFT wrist palpable. Pt tolerated well. Will continue to monitor. 2 ml air released from TR Band. No bleeding or hematoma noted. Radial and Ulnar pulse on LEFT wrist palpable. Pt tolerated well. Will continue to monitor. 1 ml air released from TR Band. No bleeding or hematoma noted. Radial and Ulnar pulse on LEFT wrist palpable. Pt tolerated well. Will continue to monitor. Hemostasis achieved at 1245. Dressing applied. Pt voices understanding of post procedure bedrest instructions. Discharge instructions reviewed with patient and family. Voiced understanding. Patient given copy of discharge instructions to take home. Per MD start taking ELIQUIS tonight. 1:33 PM    Pt discharged via wheelchair with Nilda Zaragoza RN. Personal belongings with patient upon discharge. [facility] MEDICATION AND SIDE EFFECT GUIDE    The [facility] MEDICATION AND SIDE EFFECT GUIDE was provided to the PATIENT AND CARE PROVIDER.   Information provided includes instruction about drug purpose and common side effects for the following medications:    · Aldactone

## 2021-01-14 NOTE — Clinical Note
Sheath #2: Sheath: inserted. Sheath inserted/placed in the right internal jugular  vein. Hemostasis achieved.

## 2021-01-20 ENCOUNTER — OFFICE VISIT (OUTPATIENT)
Dept: CARDIOLOGY CLINIC | Age: 61
End: 2021-01-20
Payer: MEDICAID

## 2021-01-20 VITALS
OXYGEN SATURATION: 98 % | HEART RATE: 68 BPM | HEIGHT: 69 IN | WEIGHT: 180 LBS | SYSTOLIC BLOOD PRESSURE: 135 MMHG | DIASTOLIC BLOOD PRESSURE: 85 MMHG | BODY MASS INDEX: 26.66 KG/M2

## 2021-01-20 DIAGNOSIS — I10 ESSENTIAL HYPERTENSION: ICD-10-CM

## 2021-01-20 DIAGNOSIS — I50.42 CHRONIC COMBINED SYSTOLIC AND DIASTOLIC CONGESTIVE HEART FAILURE (HCC): ICD-10-CM

## 2021-01-20 DIAGNOSIS — Z95.1 HX OF CABG: ICD-10-CM

## 2021-01-20 DIAGNOSIS — I50.22 CHRONIC SYSTOLIC CONGESTIVE HEART FAILURE (HCC): Primary | ICD-10-CM

## 2021-01-20 DIAGNOSIS — I48.92 ATRIAL FLUTTER, PAROXYSMAL (HCC): ICD-10-CM

## 2021-01-20 DIAGNOSIS — I34.0 NONRHEUMATIC MITRAL VALVE REGURGITATION: ICD-10-CM

## 2021-01-20 DIAGNOSIS — I25.10 CORONARY ARTERY DISEASE INVOLVING NATIVE CORONARY ARTERY OF NATIVE HEART WITHOUT ANGINA PECTORIS: ICD-10-CM

## 2021-01-20 DIAGNOSIS — N18.9 CHRONIC KIDNEY DISEASE, UNSPECIFIED CKD STAGE: ICD-10-CM

## 2021-01-20 PROCEDURE — 99214 OFFICE O/P EST MOD 30 MIN: CPT | Performed by: NURSE PRACTITIONER

## 2021-01-20 NOTE — PATIENT INSTRUCTIONS
Please get bloodwork drawn in 1 week; I will call you with results / med changes Follow-up with Dr. Joslyn Alvarenga in 3 months with echo same day.

## 2021-01-20 NOTE — PROGRESS NOTES
Chief Complaint   Patient presents with   [address] Follow Up     L/R coronary Bypass graph     Visit Vitals  /85 (BP 1 Location: Left arm, BP Patient Position: Sitting)   Pulse 68   Ht 5' 9\" (1.753 m)   Wt 180 lb (81.6 kg)   SpO2 98%   BMI 26.58 kg/m²     Chest pain denied   SOB denied  Dizziness denied  Swelling in hands/feet denied   Recent hospital stays 7 day about 2 months ago.

## 2021-01-20 NOTE — PROGRESS NOTES
JASMYN Reddy  [id number]  [place], [address] [facility]    Office [phone]  Fax [phone]          Vin Hernandez is a 64 y.o. male presents for fu of HFrEF. Assessment/Recommendations:  HFrEF; ICM, LVEF 20-25%  - cont Coreg to 25 mg twice daily, Entresto step 2, and travis 25m/g  - vol stable - no longer on diuretic - RHC with nml pressures   - repeat BMP and Mag in 1 wk - if stable, further titrate Entresto to step 3   - ICD pending LVEF in 3 to 4 mo; repeat echo at fu    Hypertension improving - Goal <130/80. Tentative plan to further up titrate Entresto per above. CAD- hx of cabg and pci. Cont asa 81mg, atorvastatin 80mg daily. Recent LHC with stable grafts. No anginal c/o    Aflutter- continue rate control with Coreg and stroke ppx with eliquis. Consider Aflutter ablation pending clinical course. Functional MR- moderate MR on echo 11/20    CKD, cardiorenal syndrome - repeat BMP in 1 week. Tobacco abuse - pt working to quit. Has cut back significantly. Primary Care Physician- Debra Bailey MD    Fu in 3mo with echo same day   Phone fu after labs in 1 wk. Subjective:  64 y.o. presents to office fu of CHF/med titration. [facility] 1/14/21:   · Multivessel CAD with  of native LAD and RCA  · Patent AV groove into OM2 stent  · Patent LIMA  · Suspect svg to rca is patent, but not well visualized  · Normal right and left sided filling pressures - RA 5 / PCWP 8mmHg  · Mildly reduced cardiac index (2.56 Rasheed, 2.2 Thermal)     Pt reports breathing is stable. Denies CP. Recently started on Travis s/p cath. No longer taking Bumex. Cath site left radial healing without issue. No hand / arm pain. Weight stable at home - 177lb. Taking all meds as rx - no bleeding issues. Patient denies any breathing issues, palpitations, syncope, orthopnea, edema, or paroxysmal nocturnal dyspnea.         Past Medical History:   Diagnosis Date    CAD (coronary artery disease)     Hypertension         Past Surgical History:   Procedure Laterality Date    [facility] CARDIAC SURG PROCEDURE UNLIST      stents       Current Outpatient Medications:     spironolactone (ALDACTONE) 25 mg tablet, Take 1 Tab by mouth daily. , Disp: 30 Tab, Rfl: 5    sacubitriL-valsartan (Entresto) 49-51 mg tab tablet, Take 1 Tab by mouth two (2) times a day., Disp: 60 Tab, Rfl: 0    carvediloL (COREG) 25 mg tablet, Take 1 Tab by mouth two (2) times daily (with meals). , Disp: 60 Tab, Rfl: 3    apixaban (ELIQUIS) 5 mg tablet, Take 1 Tab by mouth two (2) times a day., Disp: 60 Tab, Rfl: 5    atorvastatin (LIPITOR) 80 mg tablet, Take 80 mg by mouth daily. , Disp: , Rfl:     aspirin 81 mg chewable tablet, Take 81 mg by mouth daily. , Disp: , Rfl:     No Known Allergies     No family history on file. Social History     Tobacco Use    Smoking status: Current Every Day Smoker    Smokeless tobacco: Former User   Substance Use Topics    Alcohol use: Not Currently    Drug use: No       Review of Symptoms:  Pertinent Positive: improved dyspnea  Pertinent Negative: No chest pain, orthopnea, PND    All Other systems reviewed and are negative for a Comprehensive ROS (10+)    Physical Exam    Blood pressure 135/85, pulse 68, height 5' 9\" (1.753 m), weight 180 lb (81.6 kg), SpO2 98 %. General - well developed well nourished  Neck - JVP normal, neck supple  Cardiac - normal S1, S2, RRR, no murmurs, rubs or gallops. No clicks  Vascular -  radials pulses equal bilateral  Lungs - clear to auscultation bilaterals, no rales, wheezing or rhonchi  Abd - soft nontender, non-distended, +BS  Extremities - no edema, warm, well perfused  Neuro - nonfocal  Psych - normal mood and affect    Objective Data:    [facility] records     Cath 8/2013 3 V CAD, EF 45-50%  RCA: prox 30-40% instent restenosis of BMS.  Distal BMS 30 instent restenosis     CABG at [facility] 8/2013    Cardiac cath 2018: severe 3V disease  Patent venous graft to PDA and LILMA graft to LAD  Successful PCI of prox Lcx into OM2 w BILLY    Echo 2018 LV 55% cLVH basal inferior and inferolateral wall hypokinesis. Mild RV dysfunction. No sig valve disease    Hx of lung nodule per CT 8/2013 5 MM JEREMY. 3 mm RLL. Hx of pneumothorax w chest tube placement s/p fall    6/2018- 3.6 cm left renal mass highly suspicious of RCC    11/23/20   ECHO ADULT COMPLETE 11/24/2020 11/24/2020    Narrative · LV: Estimated LVEF is 20 - 25%. Mildly dilated left ventricle. Upper   normal wall thickness. Severely reduced systolic function. Left   ventricular diastolic dysfunction. The findings are consistent with   ischemic cardiomyopathy. · RV: Moderately reduced systolic function. · LA: Moderately dilated left atrium. · RA: Mildly dilated right atrium. · AV: Sclerosis with reduced excursion, no stenosis. · MV: Mitral valve leaflet calcification. Mild mitral annular   calcification. Moderate mitral valve regurgitation is present. · TV: Mild to moderate tricuspid valve regurgitation is present. · PA: Mild pulmonary hypertension. Pulmonary arterial systolic pressure is   40 mmHg. · IVC: Moderately elevated central venous pressure (10-15 mmHg); IVC   diameter is larger than 21 mm and collapses more than 50% with   respiration.         Signed by: Bailey West,      01/14/21   CARDIAC PROCEDURE 01/14/2021 1/14/2021    Narrative · Multivessel CAD with  of native LAD and RCA  · Patent AV groove into OM2 stent  · Patent LIMA  · Suspect svg to rca is patent, but not well visualized  · Normal right and left sided filling presssure  · Mildly reduced cardiac index     Findings:   L Main: large caliber, widely patent  LAD: proximal , mid and distal vessel fill via lima, distal and apical   lad diminutive in size, D1 small caliber with severe diffuse disease  LCx: large caliber, AV groove stent widely patent, moderate om1 with   diffuse mild disease, AV groove lcx into OM2 with patent proximal stent   and diffuse mild to moderate disease within distal branching vessel  RCA:  within proximal stent, no collaterals, SVG to distal RCA likely   is patent  [address]: widely patent  SVG to RCA: very faintly seen filling on aortogram, no collaterals   visualized to RCA, suspect patent     LVEDP:  12 mmhg        RHC findings:     RAP=    5  mmHg  RVSP=  40/5  mmHg  PAP=     36/12/20  mmHg  PCWP=  8  mmHg  CO=       Rasheed 5.11, thermal 4.4  L/min  CI=         Rasheed 2.56, thermal  2.2  L/min/m2                 Signed by: DO Markus Babin, ANP

## 2021-02-08 ENCOUNTER — TELEPHONE (OUTPATIENT)
Dept: CARDIOLOGY CLINIC | Age: 61
End: 2021-02-08

## 2021-02-08 NOTE — LETTER
2/22/2021 3:14 PM 
 
Mr. Ángel Garcia [address] [address] We have tried calling you several times leaving messages on your voicemail in regards to your recent lab results. Please call our office at [phone] to discuss this matter further.  
 
 
Sincerely, 
 
 
 
 
Osvaldo Allan NP

## 2021-02-11 ENCOUNTER — TELEPHONE (OUTPATIENT)
Dept: CARDIAC REHAB | Age: 61
End: 2021-02-11

## 2021-03-10 ENCOUNTER — TELEPHONE (OUTPATIENT)
Dept: CARDIOLOGY CLINIC | Age: 61
End: 2021-03-10

## 2021-03-10 NOTE — TELEPHONE ENCOUNTER
Labs stable. Will further up titrate Entresto therapy to step 3.       Lab Results   Component Value Date/Time    Sodium 140 03/08/2021 01:02 PM    Potassium 5.0 03/08/2021 01:02 PM    Chloride 106 03/08/2021 01:02 PM    CO2 22 03/08/2021 01:02 PM    Anion gap 5 01/14/2021 08:10 AM    Glucose 94 03/08/2021 01:02 PM    BUN 25 (H) 03/08/2021 01:02 PM    Creatinine 1.39 (H) 03/08/2021 01:02 PM    BUN/Creatinine ratio 18 03/08/2021 01:02 PM    GFR est AA 64 03/08/2021 01:02 PM    GFR est non-AA 55 (L) 03/08/2021 01:02 PM    Calcium 9.0 03/08/2021 01:02 PM     Magnesium   Date Value Ref Range Status   03/08/2021 2.0 1.6 - 2.3 mg/dL Final   11/30/2020 2.4 1.6 - 2.4 mg/dL Final   11/23/2020 2.2 1.6 - 2.4 mg/dL Final   07/31/2009 2.2 1.6 - 2.4 MG/DL Final

## 2021-03-11 LAB
BUN SERPL-MCNC: 25 MG/DL (ref 6–24)
BUN/CREAT SERPL: 18 (ref 9–20)
CALCIUM SERPL-MCNC: 9 MG/DL (ref 8.7–10.2)
CHLORIDE SERPL-SCNC: 106 MMOL/L (ref 96–106)
CO2 SERPL-SCNC: 22 MMOL/L (ref 20–29)
CREAT SERPL-MCNC: 1.39 MG/DL (ref 0.76–1.27)
GLUCOSE SERPL-MCNC: 94 MG/DL (ref 65–99)
INTERPRETATION: NORMAL
MAGNESIUM SERPL-MCNC: 2 MG/DL (ref 1.6–2.3)
POTASSIUM SERPL-SCNC: 5 MMOL/L (ref 3.5–5.2)
SODIUM SERPL-SCNC: 140 MMOL/L (ref 134–144)

## 2021-03-19 RX ORDER — SACUBITRIL AND VALSARTAN 97; 103 MG/1; MG/1
1 TABLET, FILM COATED ORAL 2 TIMES DAILY
Qty: 60 TAB | Refills: 1 | Status: SHIPPED | OUTPATIENT
Start: 2021-03-19

## 2021-08-03 PROBLEM — I50.9 CHF (CONGESTIVE HEART FAILURE) (HCC): Status: RESOLVED | Noted: 2020-11-23 | Resolved: 2021-08-03

## 2022-03-18 PROBLEM — R06.09 DYSPNEA ON EXERTION: Status: ACTIVE | Noted: 2020-11-23

## 2022-03-18 PROBLEM — I25.10 CAD (CORONARY ARTERY DISEASE): Status: ACTIVE | Noted: 2020-11-23

## 2022-03-18 PROBLEM — N17.9 AKI (ACUTE KIDNEY INJURY) (HCC): Status: ACTIVE | Noted: 2020-11-23

## 2022-03-19 PROBLEM — R77.8 ELEVATED TROPONIN: Status: ACTIVE | Noted: 2020-11-24

## 2022-03-19 PROBLEM — Z72.0 TOBACCO ABUSE: Status: ACTIVE | Noted: 2020-11-23

## 2022-03-19 PROBLEM — I10 ESSENTIAL HYPERTENSION: Status: ACTIVE | Noted: 2020-11-23

## 2022-03-19 PROBLEM — I48.92 NEW ONSET ATRIAL FLUTTER (HCC): Status: ACTIVE | Noted: 2020-11-23

## 2022-03-19 PROBLEM — R79.89 ELEVATED TROPONIN: Status: ACTIVE | Noted: 2020-11-24

## 2022-03-19 PROBLEM — I16.0 HYPERTENSIVE URGENCY: Status: ACTIVE | Noted: 2020-11-24

## 2022-03-19 PROBLEM — I50.9 ACUTE CONGESTIVE HEART FAILURE (HCC): Status: ACTIVE | Noted: 2020-11-23

## 2022-07-05 NOTE — PROGRESS NOTES
1915: Bedside and Verbal shift change report given to Merari Good (oncoming nurse) by Kimmie Mathur (offgoing nurse). Report included the following information SBAR, Kardex, Intake/Output, MAR and Recent Results. 6745: notified by tele, pt converted to NSR    0500: Pt stating he was unable to fall asleep during the night, could not lie supine without getting SOB. Advised pt to recline chair and try to sleep that way. Provided suppplemental 2L NC if pt became SOB. 0715: Bedside and Verbal shift change report given to Kimberly (oncoming nurse) by Leonila Vasquez (offgoing nurse). Report included the following information SBAR, Kardex, Intake/Output, MAR and Recent Results. 05-Jul-2022 15:55

## 2023-05-12 RX ORDER — SACUBITRIL AND VALSARTAN 97; 103 MG/1; MG/1
1 TABLET, FILM COATED ORAL 2 TIMES DAILY
COMMUNITY
Start: 2021-03-19

## 2023-05-12 RX ORDER — ATORVASTATIN CALCIUM 80 MG/1
80 TABLET, FILM COATED ORAL DAILY
COMMUNITY

## 2023-05-12 RX ORDER — SPIRONOLACTONE 25 MG/1
TABLET ORAL DAILY
COMMUNITY
Start: 2021-01-14

## 2023-05-12 RX ORDER — ASPIRIN 81 MG/1
81 TABLET, CHEWABLE ORAL DAILY
COMMUNITY

## 2023-05-12 RX ORDER — CARVEDILOL 25 MG/1
TABLET ORAL 2 TIMES DAILY WITH MEALS
COMMUNITY
Start: 2020-12-18

## 2023-10-16 ENCOUNTER — APPOINTMENT (OUTPATIENT)
Facility: HOSPITAL | Age: 63
End: 2023-10-16
Payer: MEDICAID

## 2023-10-16 ENCOUNTER — HOSPITAL ENCOUNTER (INPATIENT)
Facility: HOSPITAL | Age: 63
LOS: 3 days | Discharge: HOME | End: 2023-10-19
Attending: STUDENT IN AN ORGANIZED HEALTH CARE EDUCATION/TRAINING PROGRAM | Admitting: INTERNAL MEDICINE
Payer: MEDICAID

## 2023-10-16 DIAGNOSIS — I50.23 ACUTE ON CHRONIC SYSTOLIC CHF (CONGESTIVE HEART FAILURE) (HCC): Primary | ICD-10-CM

## 2023-10-16 DIAGNOSIS — I50.21 ACUTE SYSTOLIC CONGESTIVE HEART FAILURE (HCC): ICD-10-CM

## 2023-10-16 PROBLEM — I25.10 CAD (CORONARY ARTERY DISEASE): Status: ACTIVE | Noted: 2020-11-23

## 2023-10-16 PROBLEM — I10 ESSENTIAL HYPERTENSION: Status: ACTIVE | Noted: 2020-11-23

## 2023-10-16 LAB
ALBUMIN SERPL-MCNC: 3.2 G/DL (ref 3.5–5)
ALBUMIN/GLOB SERPL: 0.8 (ref 1.1–2.2)
ALP SERPL-CCNC: 74 U/L (ref 45–117)
ALT SERPL-CCNC: 30 U/L (ref 12–78)
ANION GAP SERPL CALC-SCNC: 4 MMOL/L (ref 5–15)
AST SERPL-CCNC: 32 U/L (ref 15–37)
BASOPHILS # BLD: 0 K/UL (ref 0–0.1)
BASOPHILS NFR BLD: 0 % (ref 0–1)
BILIRUB SERPL-MCNC: 0.5 MG/DL (ref 0.2–1)
BUN SERPL-MCNC: 24 MG/DL (ref 6–20)
BUN/CREAT SERPL: 19 (ref 12–20)
CALCIUM SERPL-MCNC: 8.7 MG/DL (ref 8.5–10.1)
CHLORIDE SERPL-SCNC: 106 MMOL/L (ref 97–108)
CO2 SERPL-SCNC: 24 MMOL/L (ref 21–32)
COMMENT:: NORMAL
CREAT SERPL-MCNC: 1.29 MG/DL (ref 0.7–1.3)
DIFFERENTIAL METHOD BLD: NORMAL
EOSINOPHIL # BLD: 0.4 K/UL (ref 0–0.4)
EOSINOPHIL NFR BLD: 5 % (ref 0–7)
ERYTHROCYTE [DISTWIDTH] IN BLOOD BY AUTOMATED COUNT: 13.5 % (ref 11.5–14.5)
GLOBULIN SER CALC-MCNC: 4 G/DL (ref 2–4)
GLUCOSE SERPL-MCNC: 100 MG/DL (ref 65–100)
HCT VFR BLD AUTO: 39 % (ref 36.6–50.3)
HGB BLD-MCNC: 13.3 G/DL (ref 12.1–17)
IMM GRANULOCYTES # BLD AUTO: 0 K/UL (ref 0–0.04)
IMM GRANULOCYTES NFR BLD AUTO: 0 % (ref 0–0.5)
INR PPP: 1.1 (ref 0.9–1.1)
LYMPHOCYTES # BLD: 2.1 K/UL (ref 0.8–3.5)
LYMPHOCYTES NFR BLD: 22 % (ref 12–49)
MAGNESIUM SERPL-MCNC: 2.2 MG/DL (ref 1.6–2.4)
MCH RBC QN AUTO: 31.4 PG (ref 26–34)
MCHC RBC AUTO-ENTMCNC: 34.1 G/DL (ref 30–36.5)
MCV RBC AUTO: 92 FL (ref 80–99)
MONOCYTES # BLD: 0.8 K/UL (ref 0–1)
MONOCYTES NFR BLD: 8 % (ref 5–13)
NEUTS SEG # BLD: 6.1 K/UL (ref 1.8–8)
NEUTS SEG NFR BLD: 65 % (ref 32–75)
NRBC # BLD: 0 K/UL (ref 0–0.01)
NRBC BLD-RTO: 0 PER 100 WBC
NT PRO BNP: 5456 PG/ML
PLATELET # BLD AUTO: 271 K/UL (ref 150–400)
PMV BLD AUTO: 9.8 FL (ref 8.9–12.9)
POTASSIUM SERPL-SCNC: 5.3 MMOL/L (ref 3.5–5.1)
PROT SERPL-MCNC: 7.2 G/DL (ref 6.4–8.2)
PROTHROMBIN TIME: 11.2 SEC (ref 9–11.1)
RBC # BLD AUTO: 4.24 M/UL (ref 4.1–5.7)
SODIUM SERPL-SCNC: 134 MMOL/L (ref 136–145)
SPECIMEN HOLD: NORMAL
TROPONIN I SERPL HS-MCNC: 42 NG/L (ref 0–76)
WBC # BLD AUTO: 9.4 K/UL (ref 4.1–11.1)

## 2023-10-16 PROCEDURE — 93005 ELECTROCARDIOGRAM TRACING: CPT | Performed by: INTERNAL MEDICINE

## 2023-10-16 PROCEDURE — 80053 COMPREHEN METABOLIC PANEL: CPT

## 2023-10-16 PROCEDURE — 84484 ASSAY OF TROPONIN QUANT: CPT

## 2023-10-16 PROCEDURE — 6370000000 HC RX 637 (ALT 250 FOR IP): Performed by: STUDENT IN AN ORGANIZED HEALTH CARE EDUCATION/TRAINING PROGRAM

## 2023-10-16 PROCEDURE — 36415 COLL VENOUS BLD VENIPUNCTURE: CPT

## 2023-10-16 PROCEDURE — 83880 ASSAY OF NATRIURETIC PEPTIDE: CPT

## 2023-10-16 PROCEDURE — 71046 X-RAY EXAM CHEST 2 VIEWS: CPT

## 2023-10-16 PROCEDURE — 85025 COMPLETE CBC W/AUTO DIFF WBC: CPT

## 2023-10-16 PROCEDURE — 6360000002 HC RX W HCPCS: Performed by: STUDENT IN AN ORGANIZED HEALTH CARE EDUCATION/TRAINING PROGRAM

## 2023-10-16 PROCEDURE — 99285 EMERGENCY DEPT VISIT HI MDM: CPT

## 2023-10-16 PROCEDURE — 1100000000 HC RM PRIVATE

## 2023-10-16 PROCEDURE — 96374 THER/PROPH/DIAG INJ IV PUSH: CPT

## 2023-10-16 PROCEDURE — 83735 ASSAY OF MAGNESIUM: CPT

## 2023-10-16 PROCEDURE — 85610 PROTHROMBIN TIME: CPT

## 2023-10-16 RX ORDER — ACETAMINOPHEN 325 MG/1
650 TABLET ORAL EVERY 6 HOURS PRN
Status: DISCONTINUED | OUTPATIENT
Start: 2023-10-16 | End: 2023-10-19 | Stop reason: HOSPADM

## 2023-10-16 RX ORDER — ONDANSETRON 2 MG/ML
4 INJECTION INTRAMUSCULAR; INTRAVENOUS EVERY 6 HOURS PRN
Status: DISCONTINUED | OUTPATIENT
Start: 2023-10-16 | End: 2023-10-19 | Stop reason: HOSPADM

## 2023-10-16 RX ORDER — ACETAMINOPHEN 650 MG/1
650 SUPPOSITORY RECTAL EVERY 6 HOURS PRN
Status: DISCONTINUED | OUTPATIENT
Start: 2023-10-16 | End: 2023-10-19 | Stop reason: HOSPADM

## 2023-10-16 RX ORDER — SODIUM CHLORIDE 0.9 % (FLUSH) 0.9 %
5-40 SYRINGE (ML) INJECTION EVERY 12 HOURS SCHEDULED
Status: DISCONTINUED | OUTPATIENT
Start: 2023-10-16 | End: 2023-10-19 | Stop reason: HOSPADM

## 2023-10-16 RX ORDER — SODIUM CHLORIDE 0.9 % (FLUSH) 0.9 %
5-40 SYRINGE (ML) INJECTION PRN
Status: DISCONTINUED | OUTPATIENT
Start: 2023-10-16 | End: 2023-10-19 | Stop reason: HOSPADM

## 2023-10-16 RX ORDER — BUMETANIDE 0.25 MG/ML
1 INJECTION INTRAMUSCULAR; INTRAVENOUS EVERY 12 HOURS
Status: DISCONTINUED | OUTPATIENT
Start: 2023-10-17 | End: 2023-10-19

## 2023-10-16 RX ORDER — POLYETHYLENE GLYCOL 3350 17 G/17G
17 POWDER, FOR SOLUTION ORAL DAILY PRN
Status: DISCONTINUED | OUTPATIENT
Start: 2023-10-16 | End: 2023-10-19 | Stop reason: HOSPADM

## 2023-10-16 RX ORDER — FUROSEMIDE 10 MG/ML
40 INJECTION INTRAMUSCULAR; INTRAVENOUS ONCE
Status: COMPLETED | OUTPATIENT
Start: 2023-10-16 | End: 2023-10-16

## 2023-10-16 RX ORDER — SODIUM CHLORIDE 9 MG/ML
INJECTION, SOLUTION INTRAVENOUS PRN
Status: DISCONTINUED | OUTPATIENT
Start: 2023-10-16 | End: 2023-10-19 | Stop reason: HOSPADM

## 2023-10-16 RX ORDER — PROCHLORPERAZINE EDISYLATE 5 MG/ML
10 INJECTION INTRAMUSCULAR; INTRAVENOUS EVERY 6 HOURS PRN
Status: DISCONTINUED | OUTPATIENT
Start: 2023-10-16 | End: 2023-10-19 | Stop reason: HOSPADM

## 2023-10-16 RX ORDER — SENNA AND DOCUSATE SODIUM 50; 8.6 MG/1; MG/1
1 TABLET, FILM COATED ORAL 2 TIMES DAILY
Status: DISCONTINUED | OUTPATIENT
Start: 2023-10-16 | End: 2023-10-19 | Stop reason: HOSPADM

## 2023-10-16 RX ORDER — OXYCODONE HYDROCHLORIDE 5 MG/1
5 TABLET ORAL EVERY 4 HOURS PRN
Status: DISCONTINUED | OUTPATIENT
Start: 2023-10-16 | End: 2023-10-19 | Stop reason: HOSPADM

## 2023-10-16 RX ADMIN — APIXABAN 5 MG: 5 TABLET, FILM COATED ORAL at 18:48

## 2023-10-16 RX ADMIN — FUROSEMIDE 40 MG: 10 INJECTION, SOLUTION INTRAMUSCULAR; INTRAVENOUS at 20:21

## 2023-10-16 ASSESSMENT — PAIN - FUNCTIONAL ASSESSMENT: PAIN_FUNCTIONAL_ASSESSMENT: NONE - DENIES PAIN

## 2023-10-16 NOTE — ED TRIAGE NOTES
Pt arrives co SOB and leg swelling. States he ran out of his meds approx 2 weeks ago. Has been trying to get in with PCP but hasnt been able to. States no CP.   States 8lb weight gain over last 2 weeks

## 2023-10-17 ENCOUNTER — APPOINTMENT (OUTPATIENT)
Facility: HOSPITAL | Age: 63
End: 2023-10-17
Attending: INTERNAL MEDICINE
Payer: MEDICAID

## 2023-10-17 PROBLEM — I50.23 ACUTE ON CHRONIC SYSTOLIC CHF (CONGESTIVE HEART FAILURE) (HCC): Status: ACTIVE | Noted: 2023-10-17

## 2023-10-17 LAB
ALBUMIN SERPL-MCNC: 3.4 G/DL (ref 3.5–5)
ALBUMIN/GLOB SERPL: 0.9 (ref 1.1–2.2)
ALP SERPL-CCNC: 75 U/L (ref 45–117)
ALT SERPL-CCNC: 30 U/L (ref 12–78)
ANION GAP SERPL CALC-SCNC: 5 MMOL/L (ref 5–15)
AST SERPL-CCNC: 21 U/L (ref 15–37)
BASOPHILS # BLD: 0 K/UL (ref 0–0.1)
BASOPHILS NFR BLD: 0 % (ref 0–1)
BILIRUB SERPL-MCNC: 0.7 MG/DL (ref 0.2–1)
BUN SERPL-MCNC: 25 MG/DL (ref 6–20)
BUN/CREAT SERPL: 17 (ref 12–20)
CALCIUM SERPL-MCNC: 8.3 MG/DL (ref 8.5–10.1)
CHLORIDE SERPL-SCNC: 105 MMOL/L (ref 97–108)
CHOLEST SERPL-MCNC: 176 MG/DL
CO2 SERPL-SCNC: 27 MMOL/L (ref 21–32)
CREAT SERPL-MCNC: 1.46 MG/DL (ref 0.7–1.3)
DIFFERENTIAL METHOD BLD: ABNORMAL
ECHO AV AREA PEAK VELOCITY: 0.9 CM2
ECHO AV AREA VTI: 0.9 CM2
ECHO AV AREA/BSA PEAK VELOCITY: 0.4 CM2/M2
ECHO AV AREA/BSA VTI: 0.4 CM2/M2
ECHO AV MEAN GRADIENT: 9 MMHG
ECHO AV MEAN VELOCITY: 1.4 M/S
ECHO AV PEAK GRADIENT: 18 MMHG
ECHO AV PEAK VELOCITY: 2.2 M/S
ECHO AV VELOCITY RATIO: 0.36
ECHO AV VTI: 43.9 CM
ECHO BSA: 2.03 M2
ECHO LA DIAMETER INDEX: 2.19 CM/M2
ECHO LA DIAMETER: 4.4 CM
ECHO LV E' LATERAL VELOCITY: 6 CM/S
ECHO LV E' SEPTAL VELOCITY: 4 CM/S
ECHO LV EDV A2C: 178 ML
ECHO LV EDV A4C: 176 ML
ECHO LV EDV BP: 180 ML (ref 67–155)
ECHO LV EDV INDEX A4C: 88 ML/M2
ECHO LV EDV INDEX BP: 90 ML/M2
ECHO LV EDV NDEX A2C: 89 ML/M2
ECHO LV EJECTION FRACTION A2C: 30 %
ECHO LV EJECTION FRACTION A4C: 41 %
ECHO LV EJECTION FRACTION BIPLANE: 35 % (ref 55–100)
ECHO LV ESV A2C: 125 ML
ECHO LV ESV A4C: 104 ML
ECHO LV ESV BP: 118 ML (ref 22–58)
ECHO LV ESV INDEX A2C: 62 ML/M2
ECHO LV ESV INDEX A4C: 52 ML/M2
ECHO LV ESV INDEX BP: 59 ML/M2
ECHO LV FRACTIONAL SHORTENING: 12 % (ref 28–44)
ECHO LV INTERNAL DIMENSION DIASTOLE INDEX: 2.59 CM/M2
ECHO LV INTERNAL DIMENSION DIASTOLIC: 5.2 CM (ref 4.2–5.9)
ECHO LV INTERNAL DIMENSION SYSTOLIC INDEX: 2.29 CM/M2
ECHO LV INTERNAL DIMENSION SYSTOLIC: 4.6 CM
ECHO LV IVSD: 1 CM (ref 0.6–1)
ECHO LV MASS 2D: 207.3 G (ref 88–224)
ECHO LV MASS INDEX 2D: 103.1 G/M2 (ref 49–115)
ECHO LV POSTERIOR WALL DIASTOLIC: 1.1 CM (ref 0.6–1)
ECHO LV RELATIVE WALL THICKNESS RATIO: 0.42
ECHO LVOT AREA: 2.5 CM2
ECHO LVOT AV VTI INDEX: 0.35
ECHO LVOT DIAM: 1.8 CM
ECHO LVOT MEAN GRADIENT: 1 MMHG
ECHO LVOT PEAK GRADIENT: 3 MMHG
ECHO LVOT PEAK VELOCITY: 0.8 M/S
ECHO LVOT STROKE VOLUME INDEX: 19.4 ML/M2
ECHO LVOT SV: 38.9 ML
ECHO LVOT VTI: 15.3 CM
ECHO MV A VELOCITY: 0.53 M/S
ECHO MV AREA VTI: 1 CM2
ECHO MV E DECELERATION TIME (DT): 400.4 MS
ECHO MV E VELOCITY: 1.25 M/S
ECHO MV E/A RATIO: 2.36
ECHO MV E/E' LATERAL: 20.83
ECHO MV E/E' RATIO (AVERAGED): 26.04
ECHO MV E/E' SEPTAL: 31.25
ECHO MV LVOT VTI INDEX: 2.53
ECHO MV MAX VELOCITY: 1.6 M/S
ECHO MV MEAN GRADIENT: 2 MMHG
ECHO MV MEAN VELOCITY: 0.6 M/S
ECHO MV PEAK GRADIENT: 10 MMHG
ECHO MV VTI: 38.7 CM
ECHO PV MAX VELOCITY: 1.2 M/S
ECHO PV PEAK GRADIENT: 5 MMHG
EOSINOPHIL # BLD: 0.5 K/UL (ref 0–0.4)
EOSINOPHIL NFR BLD: 5 % (ref 0–7)
ERYTHROCYTE [DISTWIDTH] IN BLOOD BY AUTOMATED COUNT: 13.4 % (ref 11.5–14.5)
EST. AVERAGE GLUCOSE BLD GHB EST-MCNC: 117 MG/DL
GLOBULIN SER CALC-MCNC: 3.7 G/DL (ref 2–4)
GLUCOSE SERPL-MCNC: 114 MG/DL (ref 65–100)
HBA1C MFR BLD: 5.7 % (ref 4–5.6)
HCT VFR BLD AUTO: 39.3 % (ref 36.6–50.3)
HDLC SERPL-MCNC: 56 MG/DL
HDLC SERPL: 3.1 (ref 0–5)
HGB BLD-MCNC: 13.2 G/DL (ref 12.1–17)
IMM GRANULOCYTES # BLD AUTO: 0 K/UL (ref 0–0.04)
IMM GRANULOCYTES NFR BLD AUTO: 0 % (ref 0–0.5)
LDLC SERPL CALC-MCNC: 104.2 MG/DL (ref 0–100)
LYMPHOCYTES # BLD: 2.7 K/UL (ref 0.8–3.5)
LYMPHOCYTES NFR BLD: 32 % (ref 12–49)
MAGNESIUM SERPL-MCNC: 2.1 MG/DL (ref 1.6–2.4)
MCH RBC QN AUTO: 31.2 PG (ref 26–34)
MCHC RBC AUTO-ENTMCNC: 33.6 G/DL (ref 30–36.5)
MCV RBC AUTO: 92.9 FL (ref 80–99)
MONOCYTES # BLD: 0.8 K/UL (ref 0–1)
MONOCYTES NFR BLD: 9 % (ref 5–13)
NEUTS SEG # BLD: 4.5 K/UL (ref 1.8–8)
NEUTS SEG NFR BLD: 54 % (ref 32–75)
NRBC # BLD: 0 K/UL (ref 0–0.01)
NRBC BLD-RTO: 0 PER 100 WBC
NT PRO BNP: 6022 PG/ML
PHOSPHATE SERPL-MCNC: 4.6 MG/DL (ref 2.6–4.7)
PLATELET # BLD AUTO: 257 K/UL (ref 150–400)
PMV BLD AUTO: 9.6 FL (ref 8.9–12.9)
POTASSIUM SERPL-SCNC: 3.8 MMOL/L (ref 3.5–5.1)
PROT SERPL-MCNC: 7.1 G/DL (ref 6.4–8.2)
RBC # BLD AUTO: 4.23 M/UL (ref 4.1–5.7)
SODIUM SERPL-SCNC: 137 MMOL/L (ref 136–145)
TRIGL SERPL-MCNC: 79 MG/DL
TROPONIN I SERPL HS-MCNC: 44 NG/L (ref 0–76)
TSH SERPL DL<=0.05 MIU/L-ACNC: 7.84 UIU/ML (ref 0.36–3.74)
VLDLC SERPL CALC-MCNC: 15.8 MG/DL
WBC # BLD AUTO: 8.5 K/UL (ref 4.1–11.1)

## 2023-10-17 PROCEDURE — A4216 STERILE WATER/SALINE, 10 ML: HCPCS | Performed by: INTERNAL MEDICINE

## 2023-10-17 PROCEDURE — 2500000003 HC RX 250 WO HCPCS: Performed by: INTERNAL MEDICINE

## 2023-10-17 PROCEDURE — 6370000000 HC RX 637 (ALT 250 FOR IP): Performed by: INTERNAL MEDICINE

## 2023-10-17 PROCEDURE — 1100000000 HC RM PRIVATE

## 2023-10-17 PROCEDURE — 2700000000 HC OXYGEN THERAPY PER DAY

## 2023-10-17 PROCEDURE — 80061 LIPID PANEL: CPT

## 2023-10-17 PROCEDURE — 97535 SELF CARE MNGMENT TRAINING: CPT

## 2023-10-17 PROCEDURE — 99223 1ST HOSP IP/OBS HIGH 75: CPT | Performed by: STUDENT IN AN ORGANIZED HEALTH CARE EDUCATION/TRAINING PROGRAM

## 2023-10-17 PROCEDURE — 83735 ASSAY OF MAGNESIUM: CPT

## 2023-10-17 PROCEDURE — 97530 THERAPEUTIC ACTIVITIES: CPT

## 2023-10-17 PROCEDURE — 2580000003 HC RX 258: Performed by: INTERNAL MEDICINE

## 2023-10-17 PROCEDURE — 6360000004 HC RX CONTRAST MEDICATION: Performed by: STUDENT IN AN ORGANIZED HEALTH CARE EDUCATION/TRAINING PROGRAM

## 2023-10-17 PROCEDURE — 97165 OT EVAL LOW COMPLEX 30 MIN: CPT

## 2023-10-17 PROCEDURE — APPSS30 APP SPLIT SHARED TIME 16-30 MINUTES: Performed by: NURSE PRACTITIONER

## 2023-10-17 PROCEDURE — 93306 TTE W/DOPPLER COMPLETE: CPT | Performed by: STUDENT IN AN ORGANIZED HEALTH CARE EDUCATION/TRAINING PROGRAM

## 2023-10-17 PROCEDURE — 6370000000 HC RX 637 (ALT 250 FOR IP): Performed by: STUDENT IN AN ORGANIZED HEALTH CARE EDUCATION/TRAINING PROGRAM

## 2023-10-17 PROCEDURE — 84443 ASSAY THYROID STIM HORMONE: CPT

## 2023-10-17 PROCEDURE — 97161 PT EVAL LOW COMPLEX 20 MIN: CPT

## 2023-10-17 PROCEDURE — 84484 ASSAY OF TROPONIN QUANT: CPT

## 2023-10-17 PROCEDURE — 94761 N-INVAS EAR/PLS OXIMETRY MLT: CPT

## 2023-10-17 PROCEDURE — 85025 COMPLETE CBC W/AUTO DIFF WBC: CPT

## 2023-10-17 PROCEDURE — 36415 COLL VENOUS BLD VENIPUNCTURE: CPT

## 2023-10-17 PROCEDURE — 83880 ASSAY OF NATRIURETIC PEPTIDE: CPT

## 2023-10-17 PROCEDURE — 83036 HEMOGLOBIN GLYCOSYLATED A1C: CPT

## 2023-10-17 PROCEDURE — 80053 COMPREHEN METABOLIC PANEL: CPT

## 2023-10-17 PROCEDURE — C8929 TTE W OR WO FOL WCON,DOPPLER: HCPCS

## 2023-10-17 PROCEDURE — 84100 ASSAY OF PHOSPHORUS: CPT

## 2023-10-17 RX ORDER — ASPIRIN 81 MG/1
81 TABLET, CHEWABLE ORAL DAILY
Status: DISCONTINUED | OUTPATIENT
Start: 2023-10-17 | End: 2023-10-19 | Stop reason: HOSPADM

## 2023-10-17 RX ORDER — ATORVASTATIN CALCIUM 20 MG/1
80 TABLET, FILM COATED ORAL DAILY
Status: DISCONTINUED | OUTPATIENT
Start: 2023-10-17 | End: 2023-10-19 | Stop reason: HOSPADM

## 2023-10-17 RX ORDER — CARVEDILOL 12.5 MG/1
25 TABLET ORAL 2 TIMES DAILY WITH MEALS
Status: DISCONTINUED | OUTPATIENT
Start: 2023-10-17 | End: 2023-10-19 | Stop reason: HOSPADM

## 2023-10-17 RX ORDER — SPIRONOLACTONE 25 MG/1
25 TABLET ORAL DAILY
Status: DISCONTINUED | OUTPATIENT
Start: 2023-10-17 | End: 2023-10-19 | Stop reason: HOSPADM

## 2023-10-17 RX ADMIN — CARVEDILOL 25 MG: 12.5 TABLET, FILM COATED ORAL at 09:14

## 2023-10-17 RX ADMIN — APIXABAN 5 MG: 5 TABLET, FILM COATED ORAL at 19:53

## 2023-10-17 RX ADMIN — CARVEDILOL 25 MG: 12.5 TABLET, FILM COATED ORAL at 16:26

## 2023-10-17 RX ADMIN — FAMOTIDINE 20 MG: 10 INJECTION, SOLUTION INTRAVENOUS at 09:14

## 2023-10-17 RX ADMIN — APIXABAN 5 MG: 5 TABLET, FILM COATED ORAL at 09:14

## 2023-10-17 RX ADMIN — SENNOSIDES AND DOCUSATE SODIUM 1 TABLET: 50; 8.6 TABLET ORAL at 19:53

## 2023-10-17 RX ADMIN — SPIRONOLACTONE 25 MG: 25 TABLET ORAL at 09:14

## 2023-10-17 RX ADMIN — ATORVASTATIN CALCIUM 80 MG: 20 TABLET, FILM COATED ORAL at 09:14

## 2023-10-17 RX ADMIN — BUMETANIDE 1 MG: 0.25 INJECTION INTRAMUSCULAR; INTRAVENOUS at 19:54

## 2023-10-17 RX ADMIN — SACUBITRIL AND VALSARTAN 1 TABLET: 97; 103 TABLET, FILM COATED ORAL at 00:51

## 2023-10-17 RX ADMIN — ASPIRIN 81 MG: 81 TABLET, CHEWABLE ORAL at 09:14

## 2023-10-17 RX ADMIN — SODIUM CHLORIDE, PRESERVATIVE FREE 5 ML: 5 INJECTION INTRAVENOUS at 20:02

## 2023-10-17 RX ADMIN — SENNOSIDES AND DOCUSATE SODIUM 1 TABLET: 50; 8.6 TABLET ORAL at 09:14

## 2023-10-17 RX ADMIN — SODIUM CHLORIDE, PRESERVATIVE FREE 10 ML: 5 INJECTION INTRAVENOUS at 09:15

## 2023-10-17 RX ADMIN — BUMETANIDE 1 MG: 0.25 INJECTION INTRAMUSCULAR; INTRAVENOUS at 09:14

## 2023-10-17 RX ADMIN — FAMOTIDINE 20 MG: 10 INJECTION, SOLUTION INTRAVENOUS at 19:53

## 2023-10-17 RX ADMIN — PERFLUTREN 1.5 ML: 6.52 INJECTION, SUSPENSION INTRAVENOUS at 14:34

## 2023-10-17 RX ADMIN — SACUBITRIL AND VALSARTAN 1 TABLET: 97; 103 TABLET, FILM COATED ORAL at 09:00

## 2023-10-17 RX ADMIN — SACUBITRIL AND VALSARTAN 1 TABLET: 97; 103 TABLET, FILM COATED ORAL at 19:53

## 2023-10-17 RX ADMIN — FAMOTIDINE 20 MG: 10 INJECTION, SOLUTION INTRAVENOUS at 00:51

## 2023-10-17 NOTE — PROGRESS NOTES
Hospitalist Progress Note      NAME:  Dameon Garcia   :  1960  MRM:  [id number]    Date/Time: 10/17/2023  12:38 PM           Assessment / Plan:     67 yo hx of HTN, CAD, Pafib, sCHF EF 20-25%, presented w/ dyspnea, edema, acute CHF. The patient c/o worsening TOBIAS for 2 weeks, associated with LE edema, orthopnea, and intermittent chest pain. He ran out of cardiac meds this month, pt is admitted for CHF exacerbation     ##Acute on chronic sCHF:  Ischemic etiology   Likely due to running out of meds x 1 month    last echo with EF 20-25%   Plan   Will monitor on tele. Repeat echo. Continue IV bumex. Cont Entresto, aldactone, Exelon Sevence I/O. Daily weights, admission wt: 85 kg      ##Chest pain:   vague, intermittent, likely related to CHF. Trops 42 > 44   plan   Cont ASA, eliquis, BB, statin. Use IV dilaudid prn severe pain     ##Hx Aflutter  Pt is currently in sinus rhythm,   Plan  continue amiodarone and eliquis. Consider AFlutter ablation pending clinical course. #HTN/CAD: cont coreg, ASA, eliquis, statin     #CKD: likely has CKD 3. Will monitor Cr closely on diuretics    I have personally reviewed the radiographs, laboratory data in Epic and decisions and statements above are based partially on this personal interpretation. Care Plan discussed with: Patient    Discussed:  Care Plan    Prophylaxis:  Eliquis 5     Disposition:  Home w/Family           ___________________________________________________    Attending Physician: Neeta Tirado MD        Subjective:     Chief Complaint:  SOB    ROS:  (bold if positive, if negative)    Tolerating PT  Tolerating Diet          Objective:     Patient is reporting SOB has improved since hospital admission   Reports good urine out, with dec edema of lower extremities   Vitals:          Last 24hrs VS reviewed since prior progress note.  Most recent are:    Vitals:    10/17/23 0804   BP: (!) 141/85   Pulse: 59   Resp: 15

## 2023-10-17 NOTE — H&P
[address] [place] [address]  [phone]        Hospitalist Admission History and Physical      NAME:  Anju Ortega   :   1960   MRN:  [id number]     PCP:  Lashon Hauser MD     Date/Time of service:  10/16/2023  8:34 PM        Subjective:     CHIEF COMPLAINT: dyspnea, edema     HISTORY OF PRESENT ILLNESS:     The patient is a 65 yo hx of HTN, CAD, Pafib, sCHF EF 20-25%, presented w/ dyspnea, edema, acute CHF. The patient c/o worsening TOBIAS for 2 weeks, associated with LE edema, orthopnea, and intermittent chest pain. He ran out of cardiac meds this month when his PCP office closed down. Denied fevers, chills, nausea, vomiting, diarrhea. In the ED, trop was neg. Pro BNP was 5,456. CXR neg. No Known Allergies    Prior to Admission medications    Medication Sig Start Date End Date Taking?  Authorizing Provider   apixaban (ELIQUIS) 5 MG TABS tablet Take by mouth 2 times daily 20   Automatic Reconciliation, Ar   aspirin 81 MG chewable tablet Take 1 tablet by mouth daily    Automatic Reconciliation, Ar   atorvastatin (LIPITOR) 80 MG tablet Take 1 tablet by mouth daily    Automatic Reconciliation, Ar   carvedilol (COREG) 25 MG tablet Take by mouth 2 times daily (with meals) 20   Automatic Reconciliation, Ar   sacubitril-valsartan (ENTRESTO)  MG per tablet Take 1 tablet by mouth 2 times daily 3/19/21   Automatic Reconciliation, Ar   spironolactone (ALDACTONE) 25 MG tablet Take by mouth daily 21   Automatic Reconciliation, Ar       Past Medical History:   Diagnosis Date    CAD (coronary artery disease)     Hypertension     Paroxysmal A-fib (HCC)     Systolic CHF ([address])         Past Surgical History:   Procedure Laterality Date    ND UNLISTED PROCEDURE CARDIAC SURGERY      stents       Social History     Tobacco Use    Smoking status: Every Day    Smokeless tobacco: Former   Substance Use Topics    Alcohol use: Not Currently

## 2023-10-17 NOTE — CONSULTS
Jose Santamaria MD    0.9 % sodium chloride infusion, , IntraVENous, PRN, Gilberto Santamaria MD    ondansetron (ZOFRAN) injection 4 mg, 4 mg, IntraVENous, Q6H PRN, Jose Santamaria MD    sennosides-docusate sodium (SENOKOT-S) 8.6-50 MG tablet 1 tablet, 1 tablet, Oral, BID, Jose Santamaria MD    polyethylene glycol (GLYCOLAX) packet 17 g, 17 g, Oral, Daily PRN, Jose Santamaria MD    famotidine (PEPCID) 20 mg in sodium chloride (PF) 0.9 % 10 mL injection, 20 mg, IntraVENous, BID, Jose Santamaria MD, 20 mg at 10/17/23 0051    acetaminophen (TYLENOL) tablet 650 mg, 650 mg, Oral, Q6H PRN **OR** acetaminophen (TYLENOL) suppository 650 mg, 650 mg, Rectal, Q6H PRN, Gilberto Santamaria MD    HYDROmorphone (DILAUDID) injection 0.5 mg, 0.5 mg, IntraVENous, Q4H PRN, Jose Santamaria MD    oxyCODONE (ROXICODONE) immediate release tablet 5 mg, 5 mg, Oral, Q4H PRN, Gilberto Santamaria MD    Current Outpatient Medications:     apixaban (ELIQUIS) 5 MG TABS tablet, Take by mouth 2 times daily, Disp: , Rfl:     aspirin 81 MG chewable tablet, Take 1 tablet by mouth daily, Disp: , Rfl:     atorvastatin (LIPITOR) 80 MG tablet, Take 1 tablet by mouth daily, Disp: , Rfl:     carvedilol (COREG) 25 MG tablet, Take by mouth 2 times daily (with meals), Disp: , Rfl:     sacubitril-valsartan (ENTRESTO)  MG per tablet, Take 1 tablet by mouth 2 times daily, Disp: , Rfl:     spironolactone (ALDACTONE) 25 MG tablet, Take by mouth daily, Disp: , Rfl:     Katie Pineda, APRN - NP    [address]  Cardiology  Call center: Meeta Archibald [phone]  (F) [phone]      CC: Mike Plaza MD

## 2023-10-18 LAB
ANION GAP SERPL CALC-SCNC: 4 MMOL/L (ref 5–15)
BUN SERPL-MCNC: 28 MG/DL (ref 6–20)
BUN/CREAT SERPL: 16 (ref 12–20)
CALCIUM SERPL-MCNC: 8.8 MG/DL (ref 8.5–10.1)
CHLORIDE SERPL-SCNC: 100 MMOL/L (ref 97–108)
CO2 SERPL-SCNC: 32 MMOL/L (ref 21–32)
CREAT SERPL-MCNC: 1.7 MG/DL (ref 0.7–1.3)
ERYTHROCYTE [DISTWIDTH] IN BLOOD BY AUTOMATED COUNT: 13.6 % (ref 11.5–14.5)
GLUCOSE SERPL-MCNC: 119 MG/DL (ref 65–100)
HCT VFR BLD AUTO: 45.8 % (ref 36.6–50.3)
HGB BLD-MCNC: 15.2 G/DL (ref 12.1–17)
MCH RBC QN AUTO: 30.7 PG (ref 26–34)
MCHC RBC AUTO-ENTMCNC: 33.2 G/DL (ref 30–36.5)
MCV RBC AUTO: 92.5 FL (ref 80–99)
NRBC # BLD: 0 K/UL (ref 0–0.01)
NRBC BLD-RTO: 0 PER 100 WBC
NT PRO BNP: 2753 PG/ML
PLATELET # BLD AUTO: 294 K/UL (ref 150–400)
PMV BLD AUTO: 9.1 FL (ref 8.9–12.9)
POTASSIUM SERPL-SCNC: 4 MMOL/L (ref 3.5–5.1)
RBC # BLD AUTO: 4.95 M/UL (ref 4.1–5.7)
SODIUM SERPL-SCNC: 136 MMOL/L (ref 136–145)
WBC # BLD AUTO: 10.6 K/UL (ref 4.1–11.1)

## 2023-10-18 PROCEDURE — A4216 STERILE WATER/SALINE, 10 ML: HCPCS | Performed by: INTERNAL MEDICINE

## 2023-10-18 PROCEDURE — 2500000003 HC RX 250 WO HCPCS: Performed by: INTERNAL MEDICINE

## 2023-10-18 PROCEDURE — 80048 BASIC METABOLIC PNL TOTAL CA: CPT

## 2023-10-18 PROCEDURE — 6370000000 HC RX 637 (ALT 250 FOR IP): Performed by: INTERNAL MEDICINE

## 2023-10-18 PROCEDURE — 99232 SBSQ HOSP IP/OBS MODERATE 35: CPT | Performed by: STUDENT IN AN ORGANIZED HEALTH CARE EDUCATION/TRAINING PROGRAM

## 2023-10-18 PROCEDURE — 2580000003 HC RX 258: Performed by: INTERNAL MEDICINE

## 2023-10-18 PROCEDURE — 94761 N-INVAS EAR/PLS OXIMETRY MLT: CPT

## 2023-10-18 PROCEDURE — 94640 AIRWAY INHALATION TREATMENT: CPT

## 2023-10-18 PROCEDURE — 83880 ASSAY OF NATRIURETIC PEPTIDE: CPT

## 2023-10-18 PROCEDURE — 6370000000 HC RX 637 (ALT 250 FOR IP): Performed by: STUDENT IN AN ORGANIZED HEALTH CARE EDUCATION/TRAINING PROGRAM

## 2023-10-18 PROCEDURE — 85027 COMPLETE CBC AUTOMATED: CPT

## 2023-10-18 PROCEDURE — 1100000000 HC RM PRIVATE

## 2023-10-18 PROCEDURE — APPSS30 APP SPLIT SHARED TIME 16-30 MINUTES: Performed by: NURSE PRACTITIONER

## 2023-10-18 PROCEDURE — 36415 COLL VENOUS BLD VENIPUNCTURE: CPT

## 2023-10-18 RX ORDER — IPRATROPIUM BROMIDE AND ALBUTEROL SULFATE 2.5; .5 MG/3ML; MG/3ML
1 SOLUTION RESPIRATORY (INHALATION)
Status: DISCONTINUED | OUTPATIENT
Start: 2023-10-18 | End: 2023-10-19 | Stop reason: HOSPADM

## 2023-10-18 RX ADMIN — CARVEDILOL 25 MG: 12.5 TABLET, FILM COATED ORAL at 09:02

## 2023-10-18 RX ADMIN — CARVEDILOL 25 MG: 12.5 TABLET, FILM COATED ORAL at 16:36

## 2023-10-18 RX ADMIN — SENNOSIDES AND DOCUSATE SODIUM 1 TABLET: 50; 8.6 TABLET ORAL at 20:27

## 2023-10-18 RX ADMIN — BUMETANIDE 1 MG: 0.25 INJECTION INTRAMUSCULAR; INTRAVENOUS at 20:27

## 2023-10-18 RX ADMIN — SODIUM CHLORIDE, PRESERVATIVE FREE 10 ML: 5 INJECTION INTRAVENOUS at 20:28

## 2023-10-18 RX ADMIN — SENNOSIDES AND DOCUSATE SODIUM 1 TABLET: 50; 8.6 TABLET ORAL at 09:02

## 2023-10-18 RX ADMIN — APIXABAN 5 MG: 5 TABLET, FILM COATED ORAL at 20:27

## 2023-10-18 RX ADMIN — SACUBITRIL AND VALSARTAN 1 TABLET: 97; 103 TABLET, FILM COATED ORAL at 09:02

## 2023-10-18 RX ADMIN — BUMETANIDE 1 MG: 0.25 INJECTION INTRAMUSCULAR; INTRAVENOUS at 09:02

## 2023-10-18 RX ADMIN — SODIUM CHLORIDE, PRESERVATIVE FREE 10 ML: 5 INJECTION INTRAVENOUS at 09:04

## 2023-10-18 RX ADMIN — ATORVASTATIN CALCIUM 80 MG: 20 TABLET, FILM COATED ORAL at 09:02

## 2023-10-18 RX ADMIN — ASPIRIN 81 MG: 81 TABLET, CHEWABLE ORAL at 09:02

## 2023-10-18 RX ADMIN — FAMOTIDINE 20 MG: 10 INJECTION, SOLUTION INTRAVENOUS at 20:28

## 2023-10-18 RX ADMIN — SPIRONOLACTONE 25 MG: 25 TABLET ORAL at 09:01

## 2023-10-18 RX ADMIN — SACUBITRIL AND VALSARTAN 1 TABLET: 97; 103 TABLET, FILM COATED ORAL at 20:27

## 2023-10-18 RX ADMIN — IPRATROPIUM BROMIDE AND ALBUTEROL SULFATE 1 DOSE: .5; 3 SOLUTION RESPIRATORY (INHALATION) at 11:35

## 2023-10-18 RX ADMIN — IPRATROPIUM BROMIDE AND ALBUTEROL SULFATE 1 DOSE: .5; 3 SOLUTION RESPIRATORY (INHALATION) at 16:04

## 2023-10-18 NOTE — CARE COORDINATION
10/18/2023  3:37 PM  CM reviewed EMR. Low Risk/RUR. No CM needs indicated. Please consult if need arise.

## 2023-10-18 NOTE — PROGRESS NOTES
[facility]                    Cardiology Care Note     []Initial Encounter     [x]Follow-up    Patient Name: Silvino Story - PTA:[id number]/[id number] - [facility]:[id number]  Primary Cardiologist: Tejinder Falcon DO  Consulting Cardiologist: Tejinder Falcon DO     Reason for encounter: CHF    HPI:       Silvino Story is a 66 y.o. male with PMH of HF r EF HTN, CAD, presented w/ dyspnea, edema, acute CHF. The patient c/o worsening TOBIAS for 2 weeks, associated with LE edema, orthopnea, and intermittent chest pain. He ran out of cardiac meds this month when his PCP office closed down. Has gained about 8 #. He tells me his PCP retired and medicaid set him up with 3 others who are no longer in practice. He says he has an appt 10/31 with PCP. He was lost to cardiology follow up in our office in 2021. Subjective:      Silvino Story reports dyspnea, fatigue, and lower extremity edema. Assessment and Plan     HFrEF- ischemic in etiology. No anginal symptoms. Off his med regimen at least for a month. Good response to IV diuresis, cont V bumex, resumed coreg/entresto/aldactone/add jardiance. ECHO shows LV function at 20-25% unchanged from previously. Recheck pBNP     2. Hx CAD s/p CABG: resumed asa/atorvastatin    3 Hypertension: stable      4. Hx Aflutter- sinus rhythm, continue amiodarone and eliquis. 5 Hx moderate MR: Functional MR- mild on ECHO this admission      6. CKD: trend cr with duresis             ____________________________________________________________    Cardiac testing    10/16/23    ECHO (TTE) COMPLETE (PRN CONTRAST/BUBBLE/STRAIN/3D) 10/17/2023  3:09 PM (Final)    Interpretation Summary    Left Ventricle: Severely reduced left ventricular systolic function with a visually estimated EF of 20 - 25%. Left ventricle size is normal. Mildly increased wall thickness. See diagram for wall motion findings. Grade III diastolic dysfunction with increased LAP. Right Ventricle: Not well visualized. atorvastatin (LIPITOR) tablet 80 mg, 80 mg, Oral, Daily, Gilberto Santamaria MD, 80 mg at 10/17/23 0914    carvedilol (COREG) tablet 25 mg, 25 mg, Oral, BID WC, Marta Santamaria MD, 25 mg at 10/17/23 1626    sacubitril-valsartan (ENTRESTO)  MG per tablet 1 tablet, 1 tablet, Oral, BID, Marta Santamaria MD, 1 tablet at 10/17/23 1953    spironolactone (ALDACTONE) tablet 25 mg, 25 mg, Oral, Daily, Marta Santamaria MD, 25 mg at 10/17/23 0914    bumetanide (BUMEX) injection 1 mg, 1 mg, IntraVENous, Q12H, Marta Santamaria MD, 1 mg at 10/17/23 1954    prochlorperazine (COMPAZINE) injection 10 mg, 10 mg, IntraVENous, Q6H PRN, Gilberto Santamaria MD    sodium chloride flush 0.9 % injection 5-40 mL, 5-40 mL, IntraVENous, 2 times per day, Marta Santamaria MD, 5 mL at 10/17/23 2002    sodium chloride flush 0.9 % injection 5-40 mL, 5-40 mL, IntraVENous, PRN, Gilberto Santamaria MD    0.9 % sodium chloride infusion, , IntraVENous, PRN, Gilberto Santamaria MD    ondansetron (ZOFRAN) injection 4 mg, 4 mg, IntraVENous, Q6H PRN, Marta Santamaria MD    sennosides-docusate sodium (SENOKOT-S) 8.6-50 MG tablet 1 tablet, 1 tablet, Oral, BID, Marta Santamaria MD, 1 tablet at 10/17/23 1953    polyethylene glycol (GLYCOLAX) packet 17 g, 17 g, Oral, Daily PRN, Marta Santamaria MD    famotidine (PEPCID) 20 mg in sodium chloride (PF) 0.9 % 10 mL injection, 20 mg, IntraVENous, BID, Gilberto Santamaria MD, 20 mg at 10/17/23 1953    acetaminophen (TYLENOL) tablet 650 mg, 650 mg, Oral, Q6H PRN **OR** acetaminophen (TYLENOL) suppository 650 mg, 650 mg, Rectal, Q6H PRN, Gilberto Santamaria MD    HYDROmorphone (DILAUDID) injection 0.5 mg, 0.5 mg, IntraVENous, Q4H PRN, Gilberto Santamaria MD    oxyCODONE (ROXICODONE) immediate release tablet 5 mg, 5 mg, Oral, Q4H PRN, Do, MD Jefferson Rizvi APRN - NP    [facility]  Call center: Waddell Hodgkin) [phone]  (A) [phone]      CC: Jason Matthews MD

## 2023-10-18 NOTE — PROGRESS NOTES
Physician Progress Note      PATIENT:               Jennifer Cao  CSN #:                  [id number]  :                       1960  ADMIT DATE:       10/16/2023 7:42 PM  [address] [facility] DATE:  RESPONDING  PROVIDER #:        Roseann Hernandez MD          QUERY TEXT:    Patient was admitted with acute on chronic systolic CHF, noted to have   \"Paroxysmal atrial fibrillation\" in H&P note on 10/16 and is maintained on   Eliquis. If possible, please document in progress notes and discharge summary   if you are evaluating and/or treating any of the following: The medical record reflects the following:  Risk Factors: Age 66 M, HTN, CHF  Clinical Indicators: 10/16 H&P noted \"Paroxysmal atrial fibrillation\" and is   maintained on Eliquis  Treatment: Eliquis 5 mg 2 times daily, monitoring    Thank you,    Micaela Wisdom RN  CDI  Options provided:  -- Secondary hypercoagulable state in a patient with atrial fibrillation  -- Other - I will add my own diagnosis  -- Disagree - Not applicable / Not valid  -- Disagree - Clinically unable to determine / Unknown  -- Refer to Clinical Documentation Reviewer    PROVIDER RESPONSE TEXT:    Anticoagulant for Afib    Query created by: Micaela Wisdom on 10/18/2023 2:02 PM      Electronically signed by:   Roseann Hernandez MD 10/18/2023 2:12 PM

## 2023-10-19 VITALS
TEMPERATURE: 97.5 F | SYSTOLIC BLOOD PRESSURE: 117 MMHG | HEART RATE: 63 BPM | BODY MASS INDEX: 27.7 KG/M2 | DIASTOLIC BLOOD PRESSURE: 73 MMHG | RESPIRATION RATE: 17 BRPM | WEIGHT: 187 LBS | OXYGEN SATURATION: 96 % | HEIGHT: 69 IN

## 2023-10-19 LAB
EKG ATRIAL RATE: 67 BPM
EKG DIAGNOSIS: NORMAL
EKG P AXIS: 60 DEGREES
EKG P-R INTERVAL: 172 MS
EKG Q-T INTERVAL: 424 MS
EKG QRS DURATION: 104 MS
EKG QTC CALCULATION (BAZETT): 448 MS
EKG R AXIS: 21 DEGREES
EKG T AXIS: 170 DEGREES
EKG VENTRICULAR RATE: 67 BPM

## 2023-10-19 PROCEDURE — A4216 STERILE WATER/SALINE, 10 ML: HCPCS | Performed by: INTERNAL MEDICINE

## 2023-10-19 PROCEDURE — 6370000000 HC RX 637 (ALT 250 FOR IP): Performed by: STUDENT IN AN ORGANIZED HEALTH CARE EDUCATION/TRAINING PROGRAM

## 2023-10-19 PROCEDURE — 6370000000 HC RX 637 (ALT 250 FOR IP): Performed by: INTERNAL MEDICINE

## 2023-10-19 PROCEDURE — 2580000003 HC RX 258: Performed by: INTERNAL MEDICINE

## 2023-10-19 PROCEDURE — 94640 AIRWAY INHALATION TREATMENT: CPT

## 2023-10-19 PROCEDURE — 94761 N-INVAS EAR/PLS OXIMETRY MLT: CPT

## 2023-10-19 PROCEDURE — 2500000003 HC RX 250 WO HCPCS: Performed by: NURSE PRACTITIONER

## 2023-10-19 PROCEDURE — APPSS30 APP SPLIT SHARED TIME 16-30 MINUTES: Performed by: NURSE PRACTITIONER

## 2023-10-19 PROCEDURE — 2500000003 HC RX 250 WO HCPCS: Performed by: INTERNAL MEDICINE

## 2023-10-19 PROCEDURE — 99232 SBSQ HOSP IP/OBS MODERATE 35: CPT | Performed by: STUDENT IN AN ORGANIZED HEALTH CARE EDUCATION/TRAINING PROGRAM

## 2023-10-19 PROCEDURE — 93010 ELECTROCARDIOGRAM REPORT: CPT | Performed by: SPECIALIST

## 2023-10-19 RX ORDER — BUMETANIDE 0.25 MG/ML
1 INJECTION INTRAMUSCULAR; INTRAVENOUS DAILY
Status: DISCONTINUED | OUTPATIENT
Start: 2023-10-19 | End: 2023-10-19 | Stop reason: HOSPADM

## 2023-10-19 RX ORDER — CARVEDILOL 25 MG/1
25 TABLET ORAL 2 TIMES DAILY WITH MEALS
Qty: 60 TABLET | Refills: 3 | Status: SHIPPED | OUTPATIENT
Start: 2023-10-19

## 2023-10-19 RX ORDER — SACUBITRIL AND VALSARTAN 97; 103 MG/1; MG/1
1 TABLET, FILM COATED ORAL 2 TIMES DAILY
Qty: 60 TABLET | Refills: 1 | Status: SHIPPED | OUTPATIENT
Start: 2023-10-19

## 2023-10-19 RX ORDER — BUMETANIDE 1 MG/1
1 TABLET ORAL DAILY
Qty: 30 TABLET | Refills: 1 | Status: SHIPPED | OUTPATIENT
Start: 2023-10-19

## 2023-10-19 RX ADMIN — CARVEDILOL 25 MG: 12.5 TABLET, FILM COATED ORAL at 08:39

## 2023-10-19 RX ADMIN — ASPIRIN 81 MG: 81 TABLET, CHEWABLE ORAL at 08:20

## 2023-10-19 RX ADMIN — ATORVASTATIN CALCIUM 80 MG: 20 TABLET, FILM COATED ORAL at 08:20

## 2023-10-19 RX ADMIN — IPRATROPIUM BROMIDE AND ALBUTEROL SULFATE 1 DOSE: .5; 3 SOLUTION RESPIRATORY (INHALATION) at 07:29

## 2023-10-19 RX ADMIN — SENNOSIDES AND DOCUSATE SODIUM 1 TABLET: 50; 8.6 TABLET ORAL at 08:20

## 2023-10-19 RX ADMIN — SODIUM CHLORIDE, PRESERVATIVE FREE 10 ML: 5 INJECTION INTRAVENOUS at 08:31

## 2023-10-19 RX ADMIN — BUMETANIDE 1 MG: 0.25 INJECTION INTRAMUSCULAR; INTRAVENOUS at 08:18

## 2023-10-19 RX ADMIN — FAMOTIDINE 20 MG: 10 INJECTION, SOLUTION INTRAVENOUS at 08:18

## 2023-10-19 RX ADMIN — SPIRONOLACTONE 25 MG: 25 TABLET ORAL at 08:39

## 2023-10-19 RX ADMIN — SACUBITRIL AND VALSARTAN 1 TABLET: 97; 103 TABLET, FILM COATED ORAL at 08:21

## 2023-10-19 RX ADMIN — APIXABAN 5 MG: 5 TABLET, FILM COATED ORAL at 08:20

## 2023-10-19 NOTE — DISCHARGE INSTRUCTIONS
HOSPITALIST DISCHARGE INSTRUCTIONS  NAME:  Venu Hartmann   :  1960   MRN:  [id number]     Date/Time:  10/19/2023 9:52 AM    ADMIT DATE: 10/16/2023     DISCHARGE DATE: 10/19/2023     DISCHARGE DIAGNOSIS:  CHF Exacerbation    DISCHARGE INSTRUCTIONS:  Thank you for allowing us to participate in your care. Your discharging Hospitalist is Lizett Murphy MD. Eran Alvarez were admitted for evaluation and treatment of the above. You improved with IV diuresis. We refilled several of your medicines and started you on a fluid pill called bumex, and another called jardiance. Please follow the CHF Action Plan instructions below. MEDICATIONS:    It is important that you take the medication exactly as they are prescribed. Keep your medication in the bottles provided by the pharmacist and keep a list of the medication names, dosages, and times to be taken in your wallet. Do not take other medications without consulting your doctor. If you experience any of the following symptoms then please call your primary care physician or return to the emergency room if you cannot get hold of your doctor:  Fever, chills, nausea, vomiting, diarrhea, change in mentation, falling, bleeding, shortness of breath    Follow Up:  Please call the below provider to arrange hospital follow up appointment      Rigoberto Haines MD  [address] [place]  [phone]    Schedule an appointment as soon as possible for a visit        For questions regarding your Hospitalization or to contact the Hospital Medicine team, please call [phone]. Information obtained by :  I understand that if any problems occur once I am at home I am to contact my physician. I understand and acknowledge receipt of the instructions indicated above.                                                                                                                                            Physician's or R.N.'s Signature                                                                  Date/Time                                                                                                                                              Patient or Representative Signature                                                          Date/Time        My Heart Failure Action Plan   If you notice changes in your heart failure symptoms, follow your heart failure action plan. Acting quickly will help you to feel better and stay out of the hospital.      Danger Zone - Seek Help:  My symptoms:   Sudden, severe shortness of breath   Develop new chest pain/tightness/ heaviness   Sweating, weakness or fainting  What to do:  Call 911 NOW  ______________________________________________________________________    Caution Zone - I do not feel well:  Weight up by 3 lb in one day or 5 lb in a week   Swelling in ankles, legs or abdomen   Hard to breath when active or at night   Need to use more pillows at night   Constant cough or wheeze   Very tired  Weight down by 3 lb   Dry mouth/skin   Dizziness, Low blood pressure   What to do:   Take an extra Bumex; if this isn't helping proceed with the below:  Call your Primary Care Physician for guidance  Ramseyene People  {No department listed for PCP}   Call your Cardiologist for guidance  Call [facility] at [phone]  ________________________________________________________________  Clear Zone - I feel well:   My symptoms:   Weight on target range, no weight gain over 2 lb   Little or no swelling   Breathing is easy, no shortness of breath   What to do:  Keep taking my pills  Keep doing my daily checks -  weight, swelling and breathing   Keep eating a healthy, low salt diet  Keep making changes to improve my health  Attend all follow up appointments as scheduled

## 2023-10-19 NOTE — PLAN OF CARE
Problem: Discharge Planning  Goal: Discharge to home or other facility with appropriate resources  10/18/2023 2309 by Herlinda Conley RN  Outcome: Progressing  10/18/2023 1338 by Kellie Howard RN  Outcome: Progressing     Problem: Safety - Adult  Goal: Free from fall injury  10/18/2023 2309 by Herlinda Conley RN  Outcome: Progressing  10/18/2023 1338 by Kellie Howard RN  Outcome: Progressing     Problem: Pain  Goal: Verbalizes/displays adequate comfort level or baseline comfort level  10/18/2023 2309 by Herlinda Conley RN  Outcome: Progressing  10/18/2023 1338 by Kellie Howard RN  Outcome: Progressing     Problem: Chronic Conditions and Co-morbidities  Goal: Patient's chronic conditions and co-morbidity symptoms are monitored and maintained or improved  10/18/2023 2309 by Herlinda Conley RN  Outcome: Progressing  10/18/2023 1338 by Kellie Howard RN  Outcome: Progressing     Problem: Skin/Tissue Integrity  Goal: Absence of new skin breakdown  Description: 1. Monitor for areas of redness and/or skin breakdown  2. Assess vascular access sites hourly  3. Every 4-6 hours minimum:  Change oxygen saturation probe site  4. Every 4-6 hours:  If on nasal continuous positive airway pressure, respiratory therapy assess nares and determine need for appliance change or resting period.   10/18/2023 2309 by Herlinda Conley RN  Outcome: Progressing  10/18/2023 1338 by Kellie Howard RN  Outcome: Progressing

## 2023-10-19 NOTE — PROGRESS NOTES
Discharge instructions presented to patient. All questions and concerns addressed appropriately. New medications were read and explained to patient, patient verbalized understanding. Patient aware that prescriptions have been electronically sent to their pharmacy. All IV's removed. Patient belongings packed up and with patient. Patient walked to main entrance by Sarita Soriano RN.

## 2023-10-19 NOTE — PROGRESS NOTES
[facility]                    Cardiology Care Note     []Initial Encounter     [x]Follow-up    Patient Name: April James - VDZ:[birth year] - CQO:[id number]  Primary Cardiologist: Allison Choi DO  Consulting Cardiologist: Allison Choi DO     Reason for encounter: CHF    HPI:       April James is a 66 y.o. male with PMH of HF r EF HTN, CAD, presented w/ dyspnea, edema, acute CHF. The patient c/o worsening TOBIAS for 2 weeks, associated with LE edema, orthopnea, and intermittent chest pain. He ran out of cardiac meds this month when his PCP office closed down. Has gained about 8 #. He tells me his PCP retired and medicaid set him up with 3 others who are no longer in practice. He says he has an appt 10/31 with PCP. He was lost to cardiology follow up in our office in 2021. Subjective:      April James reports dyspnea, fatigue, and lower extremity edema. Assessment and Plan     HFrEF- ischemic in etiology. pBNP trending down. Change bumex to 1 mg qd (inc Cr). Cont gdmt. Low na diet. Add jardiance if covered by his medicaid      2. Hx CAD s/p CABG: resumed asa/atorvastatin    3 Hypertension: stable      4. Hx Aflutter- sinus rhythm, continue amiodarone and eliquis. 5 Hx moderate MR: Functional MR- mild on ECHO this admission      6. CKD: trend       Would discharge on bumex 1 mg qd in additions to current CHF meds. Compliance has bene an ongoing issue. Will arrange OP follow up              ____________________________________________________________    Cardiac testing    10/16/23    ECHO (TTE) COMPLETE (PRN CONTRAST/BUBBLE/STRAIN/3D) 10/17/2023  3:09 PM (Final)    Interpretation Summary    Left Ventricle: Severely reduced left ventricular systolic function with a visually estimated EF of 20 - 25%. Left ventricle size is normal. Mildly increased wall thickness. See diagram for wall motion findings. Grade III diastolic dysfunction with increased LAP.     Right Ventricle: Not well [phone]      CC: Yunior Oliva MD

## 2023-10-19 NOTE — DISCHARGE SUMMARY
Hospitalist Discharge Summary     Patient ID:  Chloé Briones  [id number]  66 y.o.  1960    Admit date: 10/16/2023    Discharge date and time: 10/19/2023    Admission Diagnoses: Acute systolic congestive heart failure (HCC) [I50.21]  Acute on chronic systolic CHF (congestive heart failure) (HCC) [I50.23]  Acute on chronic systolic (congestive) heart failure (HCC) [I50.23]    Discharge Diagnoses:    Principal Problem:    Acute on chronic systolic (congestive) heart failure (HCC)  Active Problems:    CAD (coronary artery disease)    Essential hypertension    Acute on chronic systolic CHF (congestive heart failure) ([address])  Resolved Problems:    * No resolved hospital problems. *         Hospital Course:   65 yo hx of HTN, CAD, Pafib, sCHF EF 20-25%, presented w/ dyspnea, edema, acute CHF. The patient c/o worsening TOBIAS for 2 weeks, associated with LE edema, orthopnea, and intermittent chest pain. He ran out of cardiac meds this month, pt is admitted for CHF exacerbation. He was admitted for further evaluation and treatment of the following: ##Acute on chronic sCHF: Ischemic etiology. Likely due to running out of meds x 1 month; last echo with EF 20-25% and Repeated echo 20-25% unchanged. Diurese well and discharges on Bumex daily as well as entresto, viridiana, jardiance, carvedilol     ##Hx Aflutter  Pt is currently in sinus rhythm,   Plan  continue amiodarone and eliquis. Consider AFlutter ablation pending clinical course. #HTN/CAD: cont coreg, ASA, eliquis, statin     #CKD: likely has CKD 3. Imaging  XR CHEST (2 VW)    Result Date: 10/16/2023  No acute intrathoracic disease.          PCP: Antonietta Murray MD     Consults: cardiology    Condition of patient at discharge: Good and Improved    Discharge Exam:    Physical Exam:    Gen: Well-developed, well-nourished, in no acute distress  HEENT:  Pink conjunctivae, PERRL, hearing intact to voice, moist mucous membranes  Neck: Supple, without masses,

## 2023-10-19 NOTE — PLAN OF CARE
Problem: Discharge Planning  Goal: Discharge to home or other facility with appropriate resources  10/19/2023 1056 by Ramesh Roman RN  Outcome: Progressing  10/18/2023 2309 by Garrel Lombard, RN  Outcome: Progressing     Problem: Safety - Adult  Goal: Free from fall injury  10/19/2023 1056 by Ramesh Roman RN  Outcome: Progressing  10/18/2023 2309 by Garrel Lombard, RN  Outcome: Progressing     Problem: Pain  Goal: Verbalizes/displays adequate comfort level or baseline comfort level  10/19/2023 1056 by Ramesh Roman RN  Outcome: Progressing  10/18/2023 2309 by Garrel Lombard, RN  Outcome: Progressing     Problem: Chronic Conditions and Co-morbidities  Goal: Patient's chronic conditions and co-morbidity symptoms are monitored and maintained or improved  10/19/2023 1056 by Ramesh Roman RN  Outcome: Progressing  10/18/2023 2309 by Garrel Lombard, RN  Outcome: Progressing     Problem: Skin/Tissue Integrity  Goal: Absence of new skin breakdown  Description: 1. Monitor for areas of redness and/or skin breakdown  2. Assess vascular access sites hourly  3. Every 4-6 hours minimum:  Change oxygen saturation probe site  4. Every 4-6 hours:  If on nasal continuous positive airway pressure, respiratory therapy assess nares and determine need for appliance change or resting period.   10/19/2023 1056 by Ramesh Roman RN  Outcome: Progressing  10/18/2023 2309 by Garrel Lombard, RN  Outcome: Progressing

## 2024-06-07 ENCOUNTER — APPOINTMENT (OUTPATIENT)
Facility: HOSPITAL | Age: 64
DRG: 194 | End: 2024-06-07
Payer: MEDICAID

## 2024-06-07 ENCOUNTER — HOSPITAL ENCOUNTER (INPATIENT)
Facility: HOSPITAL | Age: 64
LOS: 2 days | Discharge: HOME | DRG: 194 | End: 2024-06-10
Attending: EMERGENCY MEDICINE | Admitting: HOSPITALIST
Payer: MEDICAID

## 2024-06-07 DIAGNOSIS — I50.9 CONGESTIVE HEART FAILURE, UNSPECIFIED HF CHRONICITY, UNSPECIFIED HEART FAILURE TYPE (HCC): ICD-10-CM

## 2024-06-07 DIAGNOSIS — N17.9 AKI (ACUTE KIDNEY INJURY) (HCC): ICD-10-CM

## 2024-06-07 DIAGNOSIS — J96.01 ACUTE RESPIRATORY FAILURE WITH HYPOXIA (HCC): Primary | ICD-10-CM

## 2024-06-07 DIAGNOSIS — I50.23 ACUTE ON CHRONIC SYSTOLIC CONGESTIVE HEART FAILURE (HCC): ICD-10-CM

## 2024-06-07 LAB
ALBUMIN SERPL-MCNC: 3.6 G/DL (ref 3.5–5)
ALBUMIN/GLOB SERPL: 0.9 (ref 1.1–2.2)
ALP SERPL-CCNC: 69 U/L (ref 45–117)
ALT SERPL-CCNC: 26 U/L (ref 12–78)
ANION GAP SERPL CALC-SCNC: 5 MMOL/L (ref 5–15)
APTT PPP: 26.8 SEC (ref 22.1–31)
AST SERPL-CCNC: 21 U/L (ref 15–37)
BASOPHILS # BLD: 0 K/UL (ref 0–0.1)
BASOPHILS NFR BLD: 0 % (ref 0–1)
BILIRUB SERPL-MCNC: 0.5 MG/DL (ref 0.2–1)
BUN SERPL-MCNC: 31 MG/DL (ref 6–20)
BUN/CREAT SERPL: 14 (ref 12–20)
CALCIUM SERPL-MCNC: 9.5 MG/DL (ref 8.5–10.1)
CHLORIDE SERPL-SCNC: 101 MMOL/L (ref 97–108)
CK SERPL-CCNC: 194 U/L (ref 39–308)
COMMENT:: NORMAL
CREAT SERPL-MCNC: 2.28 MG/DL (ref 0.7–1.3)
DIFFERENTIAL METHOD BLD: ABNORMAL
EOSINOPHIL # BLD: 0.2 K/UL (ref 0–0.4)
EOSINOPHIL NFR BLD: 2 % (ref 0–7)
ERYTHROCYTE [DISTWIDTH] IN BLOOD BY AUTOMATED COUNT: 12.9 % (ref 11.5–14.5)
GLOBULIN SER CALC-MCNC: 4 G/DL (ref 2–4)
GLUCOSE SERPL-MCNC: 108 MG/DL (ref 65–100)
HCT VFR BLD AUTO: 44.7 % (ref 36.6–50.3)
HGB BLD-MCNC: 15.7 G/DL (ref 12.1–17)
IMM GRANULOCYTES # BLD AUTO: 0 K/UL (ref 0–0.04)
IMM GRANULOCYTES NFR BLD AUTO: 0 % (ref 0–0.5)
INR PPP: 1.1 (ref 0.9–1.1)
LYMPHOCYTES # BLD: 3.1 K/UL (ref 0.8–3.5)
LYMPHOCYTES NFR BLD: 27 % (ref 12–49)
MAGNESIUM SERPL-MCNC: 2.2 MG/DL (ref 1.6–2.4)
MCH RBC QN AUTO: 31.8 PG (ref 26–34)
MCHC RBC AUTO-ENTMCNC: 35.1 G/DL (ref 30–36.5)
MCV RBC AUTO: 90.5 FL (ref 80–99)
MONOCYTES # BLD: 1 K/UL (ref 0–1)
MONOCYTES NFR BLD: 9 % (ref 5–13)
NEUTS SEG # BLD: 7 K/UL (ref 1.8–8)
NEUTS SEG NFR BLD: 62 % (ref 32–75)
NRBC # BLD: 0 K/UL (ref 0–0.01)
NRBC BLD-RTO: 0 PER 100 WBC
NT PRO BNP: 4034 PG/ML
PHOSPHATE SERPL-MCNC: 5.1 MG/DL (ref 2.6–4.7)
PLATELET # BLD AUTO: 233 K/UL (ref 150–400)
PMV BLD AUTO: 9.8 FL (ref 8.9–12.9)
POTASSIUM SERPL-SCNC: 3.5 MMOL/L (ref 3.5–5.1)
PROT SERPL-MCNC: 7.6 G/DL (ref 6.4–8.2)
PROTHROMBIN TIME: 11 SEC (ref 9–11.1)
RBC # BLD AUTO: 4.94 M/UL (ref 4.1–5.7)
SODIUM SERPL-SCNC: 137 MMOL/L (ref 136–145)
SPECIMEN HOLD: NORMAL
THERAPEUTIC RANGE: NORMAL SECS (ref 58–77)
TROPONIN I SERPL HS-MCNC: 20 NG/L (ref 0–76)
WBC # BLD AUTO: 11.3 K/UL (ref 4.1–11.1)

## 2024-06-07 PROCEDURE — 85025 COMPLETE CBC W/AUTO DIFF WBC: CPT

## 2024-06-07 PROCEDURE — 94761 N-INVAS EAR/PLS OXIMETRY MLT: CPT

## 2024-06-07 PROCEDURE — 36415 COLL VENOUS BLD VENIPUNCTURE: CPT

## 2024-06-07 PROCEDURE — 85730 THROMBOPLASTIN TIME PARTIAL: CPT

## 2024-06-07 PROCEDURE — 99285 EMERGENCY DEPT VISIT HI MDM: CPT

## 2024-06-07 PROCEDURE — 96374 THER/PROPH/DIAG INJ IV PUSH: CPT

## 2024-06-07 PROCEDURE — 93005 ELECTROCARDIOGRAM TRACING: CPT | Performed by: EMERGENCY MEDICINE

## 2024-06-07 PROCEDURE — 85610 PROTHROMBIN TIME: CPT

## 2024-06-07 PROCEDURE — 6360000002 HC RX W HCPCS: Performed by: EMERGENCY MEDICINE

## 2024-06-07 PROCEDURE — 83880 ASSAY OF NATRIURETIC PEPTIDE: CPT

## 2024-06-07 PROCEDURE — 84484 ASSAY OF TROPONIN QUANT: CPT

## 2024-06-07 PROCEDURE — 84100 ASSAY OF PHOSPHORUS: CPT

## 2024-06-07 PROCEDURE — 80053 COMPREHEN METABOLIC PANEL: CPT

## 2024-06-07 PROCEDURE — 83735 ASSAY OF MAGNESIUM: CPT

## 2024-06-07 PROCEDURE — 71046 X-RAY EXAM CHEST 2 VIEWS: CPT

## 2024-06-07 PROCEDURE — 82550 ASSAY OF CK (CPK): CPT

## 2024-06-07 RX ORDER — FUROSEMIDE 10 MG/ML
80 INJECTION INTRAMUSCULAR; INTRAVENOUS ONCE
Status: COMPLETED | OUTPATIENT
Start: 2024-06-07 | End: 2024-06-07

## 2024-06-07 RX ORDER — FUROSEMIDE 20 MG/1
80 TABLET ORAL DAILY
Status: ON HOLD | COMMUNITY
End: 2024-06-08

## 2024-06-07 RX ADMIN — FUROSEMIDE 80 MG: 10 INJECTION, SOLUTION INTRAMUSCULAR; INTRAVENOUS at 23:03

## 2024-06-07 ASSESSMENT — PAIN SCALES - GENERAL: PAINLEVEL_OUTOF10: 4

## 2024-06-07 ASSESSMENT — PAIN - FUNCTIONAL ASSESSMENT
PAIN_FUNCTIONAL_ASSESSMENT: 0-10
PAIN_FUNCTIONAL_ASSESSMENT: 0-10

## 2024-06-07 ASSESSMENT — PAIN DESCRIPTION - ORIENTATION: ORIENTATION: LEFT;RIGHT

## 2024-06-07 ASSESSMENT — PAIN DESCRIPTION - LOCATION: LOCATION: FOOT

## 2024-06-08 ENCOUNTER — APPOINTMENT (OUTPATIENT)
Facility: HOSPITAL | Age: 64
DRG: 194 | End: 2024-06-08
Attending: INTERNAL MEDICINE
Payer: MEDICAID

## 2024-06-08 ENCOUNTER — APPOINTMENT (OUTPATIENT)
Facility: HOSPITAL | Age: 64
DRG: 194 | End: 2024-06-08
Payer: MEDICAID

## 2024-06-08 PROBLEM — I48.92 PAROXYSMAL ATRIAL FLUTTER (HCC): Status: ACTIVE | Noted: 2020-11-23

## 2024-06-08 PROBLEM — R09.02 HYPOXIA: Status: ACTIVE | Noted: 2024-06-08

## 2024-06-08 PROBLEM — I25.5 ISCHEMIC CARDIOMYOPATHY: Status: ACTIVE | Noted: 2024-06-08

## 2024-06-08 LAB
CALCIUM SERPL-MCNC: 8.5 MG/DL (ref 8.5–10.1)
ECHO AO ARCH DIAM: 2.8 CM
ECHO AO ASC DIAM: 3.1 CM
ECHO AO ASCENDING AORTA INDEX: 1.51 CM/M2
ECHO AO ROOT DIAM: 3.3 CM
ECHO AO ROOT INDEX: 1.61 CM/M2
ECHO AV AREA PEAK VELOCITY: 1.1 CM2
ECHO AV AREA VTI: 1 CM2
ECHO AV AREA/BSA PEAK VELOCITY: 0.5 CM2/M2
ECHO AV AREA/BSA VTI: 0.5 CM2/M2
ECHO AV MEAN GRADIENT: 18 MMHG
ECHO AV MEAN VELOCITY: 2 M/S
ECHO AV PEAK GRADIENT: 33 MMHG
ECHO AV PEAK VELOCITY: 2.8 M/S
ECHO AV VELOCITY RATIO: 0.39
ECHO AV VTI: 62.1 CM
ECHO BSA: 2.08 M2
ECHO LA DIAMETER INDEX: 2.24 CM/M2
ECHO LA DIAMETER: 4.6 CM
ECHO LA TO AORTIC ROOT RATIO: 1.39
ECHO LA VOL A-L A2C: 39 ML (ref 18–58)
ECHO LA VOL A-L A4C: 53 ML (ref 18–58)
ECHO LA VOL BP: 44 ML (ref 18–58)
ECHO LA VOL MOD A2C: 37 ML (ref 18–58)
ECHO LA VOL MOD A4C: 49 ML (ref 18–58)
ECHO LA VOL/BSA BIPLANE: 21 ML/M2 (ref 16–34)
ECHO LA VOLUME AREA LENGTH: 47 ML
ECHO LA VOLUME INDEX A-L A2C: 19 ML/M2 (ref 16–34)
ECHO LA VOLUME INDEX A-L A4C: 26 ML/M2 (ref 16–34)
ECHO LA VOLUME INDEX AREA LENGTH: 23 ML/M2 (ref 16–34)
ECHO LA VOLUME INDEX MOD A2C: 18 ML/M2 (ref 16–34)
ECHO LA VOLUME INDEX MOD A4C: 24 ML/M2 (ref 16–34)
ECHO LV E' LATERAL VELOCITY: 10 CM/S
ECHO LV E' SEPTAL VELOCITY: 5 CM/S
ECHO LV EDV A2C: 159 ML
ECHO LV EDV A4C: 149 ML
ECHO LV EDV BP: 154 ML (ref 67–155)
ECHO LV EDV INDEX A4C: 73 ML/M2
ECHO LV EDV INDEX BP: 75 ML/M2
ECHO LV EDV NDEX A2C: 78 ML/M2
ECHO LV EF PHYSICIAN: 50 %
ECHO LV EJECTION FRACTION A2C: 61 %
ECHO LV EJECTION FRACTION A4C: 67 %
ECHO LV ESV A2C: 62 ML
ECHO LV ESV A4C: 49 ML
ECHO LV ESV BP: 61 ML (ref 22–58)
ECHO LV ESV INDEX A2C: 30 ML/M2
ECHO LV ESV INDEX A4C: 24 ML/M2
ECHO LV ESV INDEX BP: 30 ML/M2
ECHO LV FRACTIONAL SHORTENING: 17 % (ref 28–44)
ECHO LV INTERNAL DIMENSION DIASTOLE INDEX: 2.93 CM/M2
ECHO LV INTERNAL DIMENSION DIASTOLIC: 6 CM (ref 4.2–5.9)
ECHO LV INTERNAL DIMENSION SYSTOLIC INDEX: 2.44 CM/M2
ECHO LV INTERNAL DIMENSION SYSTOLIC: 5 CM
ECHO LV IVSD: 0.9 CM (ref 0.6–1)
ECHO LV MASS 2D: 215.7 G (ref 88–224)
ECHO LV MASS INDEX 2D: 105.2 G/M2 (ref 49–115)
ECHO LV POSTERIOR WALL DIASTOLIC: 0.9 CM (ref 0.6–1)
ECHO LV RELATIVE WALL THICKNESS RATIO: 0.3
ECHO LVOT AREA: 2.8 CM2
ECHO LVOT AV VTI INDEX: 0.34
ECHO LVOT MEAN GRADIENT: 3 MMHG
ECHO LVOT PEAK GRADIENT: 5 MMHG
ECHO LVOT PEAK VELOCITY: 1.1 M/S
ECHO LVOT STROKE VOLUME INDEX: 29.6 ML/M2
ECHO LVOT SV: 60.6 ML
ECHO LVOT VTI: 21.4 CM
ECHO MV A VELOCITY: 1.23 M/S
ECHO MV AREA PHT: 2.4 CM2
ECHO MV AREA VTI: 1.5 CM2
ECHO MV E DECELERATION TIME (DT): 381.4 MS
ECHO MV E VELOCITY: 1.04 M/S
ECHO MV E/A RATIO: 0.85
ECHO MV E/E' LATERAL: 10.4
ECHO MV E/E' RATIO (AVERAGED): 15.6
ECHO MV E/E' SEPTAL: 20.8
ECHO MV LVOT VTI INDEX: 1.86
ECHO MV MAX VELOCITY: 1.3 M/S
ECHO MV MEAN GRADIENT: 2 MMHG
ECHO MV MEAN VELOCITY: 0.7 M/S
ECHO MV PEAK GRADIENT: 6 MMHG
ECHO MV PRESSURE HALF TIME (PHT): 92.5 MS
ECHO MV VTI: 39.9 CM
ECHO PULMONARY ARTERY END DIASTOLIC PRESSURE: 3 MMHG
ECHO PV MAX VELOCITY: 1.3 M/S
ECHO PV PEAK GRADIENT: 6 MMHG
ECHO PV REGURGITANT MAX VELOCITY: 0.9 M/S
ECHO RV FREE WALL PEAK S': 8 CM/S
ECHO RV INTERNAL DIMENSION: 3.5 CM
ECHO RV TAPSE: 1 CM (ref 1.7–?)
ECHO TV REGURGITANT MAX VELOCITY: 2.24 M/S
ECHO TV REGURGITANT PEAK GRADIENT: 20 MMHG
PROT UR-MCNC: 10 MG/DL (ref 0–11.9)
PROT/CREAT UR-RTO: 0.1
PTH-INTACT SERPL-MCNC: 247.6 PG/ML (ref 18.4–88)

## 2024-06-08 PROCEDURE — 6370000000 HC RX 637 (ALT 250 FOR IP): Performed by: EMERGENCY MEDICINE

## 2024-06-08 PROCEDURE — 99223 1ST HOSP IP/OBS HIGH 75: CPT | Performed by: SPECIALIST

## 2024-06-08 PROCEDURE — 2580000003 HC RX 258: Performed by: HOSPITALIST

## 2024-06-08 PROCEDURE — 1100000000 HC RM PRIVATE

## 2024-06-08 PROCEDURE — 76770 US EXAM ABDO BACK WALL COMP: CPT

## 2024-06-08 PROCEDURE — 2700000000 HC OXYGEN THERAPY PER DAY

## 2024-06-08 PROCEDURE — 6360000004 HC RX CONTRAST MEDICATION: Performed by: INTERNAL MEDICINE

## 2024-06-08 PROCEDURE — 94761 N-INVAS EAR/PLS OXIMETRY MLT: CPT

## 2024-06-08 PROCEDURE — 84156 ASSAY OF PROTEIN URINE: CPT

## 2024-06-08 PROCEDURE — C8929 TTE W OR WO FOL WCON,DOPPLER: HCPCS

## 2024-06-08 PROCEDURE — 6360000002 HC RX W HCPCS: Performed by: INTERNAL MEDICINE

## 2024-06-08 PROCEDURE — 6360000002 HC RX W HCPCS: Performed by: HOSPITALIST

## 2024-06-08 PROCEDURE — 6370000000 HC RX 637 (ALT 250 FOR IP): Performed by: HOSPITALIST

## 2024-06-08 PROCEDURE — 36415 COLL VENOUS BLD VENIPUNCTURE: CPT

## 2024-06-08 PROCEDURE — 82570 ASSAY OF URINE CREATININE: CPT

## 2024-06-08 PROCEDURE — 83970 ASSAY OF PARATHORMONE: CPT

## 2024-06-08 RX ORDER — POTASSIUM CHLORIDE 750 MG/1
40 TABLET, FILM COATED, EXTENDED RELEASE ORAL PRN
Status: DISCONTINUED | OUTPATIENT
Start: 2024-06-08 | End: 2024-06-10 | Stop reason: HOSPADM

## 2024-06-08 RX ORDER — BUMETANIDE 0.25 MG/ML
1 INJECTION INTRAMUSCULAR; INTRAVENOUS 2 TIMES DAILY
Status: DISCONTINUED | OUTPATIENT
Start: 2024-06-08 | End: 2024-06-08

## 2024-06-08 RX ORDER — CARVEDILOL 12.5 MG/1
25 TABLET ORAL 2 TIMES DAILY WITH MEALS
Status: DISCONTINUED | OUTPATIENT
Start: 2024-06-08 | End: 2024-06-10 | Stop reason: HOSPADM

## 2024-06-08 RX ORDER — ACETAMINOPHEN 325 MG/1
650 TABLET ORAL EVERY 6 HOURS PRN
Status: DISCONTINUED | OUTPATIENT
Start: 2024-06-08 | End: 2024-06-10 | Stop reason: HOSPADM

## 2024-06-08 RX ORDER — ENOXAPARIN SODIUM 100 MG/ML
40 INJECTION SUBCUTANEOUS DAILY
Status: DISCONTINUED | OUTPATIENT
Start: 2024-06-08 | End: 2024-06-08

## 2024-06-08 RX ORDER — ASPIRIN 81 MG/1
81 TABLET, CHEWABLE ORAL DAILY
Status: DISCONTINUED | OUTPATIENT
Start: 2024-06-08 | End: 2024-06-10 | Stop reason: HOSPADM

## 2024-06-08 RX ORDER — FUROSEMIDE 40 MG/1
80 TABLET ORAL DAILY
Status: ON HOLD | COMMUNITY
End: 2024-06-10

## 2024-06-08 RX ORDER — ASPIRIN 325 MG
325 TABLET ORAL
Status: COMPLETED | OUTPATIENT
Start: 2024-06-08 | End: 2024-06-08

## 2024-06-08 RX ORDER — MAGNESIUM SULFATE IN WATER 40 MG/ML
2000 INJECTION, SOLUTION INTRAVENOUS PRN
Status: DISCONTINUED | OUTPATIENT
Start: 2024-06-08 | End: 2024-06-10 | Stop reason: HOSPADM

## 2024-06-08 RX ORDER — ONDANSETRON 2 MG/ML
4 INJECTION INTRAMUSCULAR; INTRAVENOUS EVERY 6 HOURS PRN
Status: DISCONTINUED | OUTPATIENT
Start: 2024-06-08 | End: 2024-06-10 | Stop reason: HOSPADM

## 2024-06-08 RX ORDER — POLYETHYLENE GLYCOL 3350 17 G/17G
17 POWDER, FOR SOLUTION ORAL DAILY PRN
Status: DISCONTINUED | OUTPATIENT
Start: 2024-06-08 | End: 2024-06-10 | Stop reason: HOSPADM

## 2024-06-08 RX ORDER — SODIUM CHLORIDE 0.9 % (FLUSH) 0.9 %
5-40 SYRINGE (ML) INJECTION PRN
Status: DISCONTINUED | OUTPATIENT
Start: 2024-06-08 | End: 2024-06-10 | Stop reason: HOSPADM

## 2024-06-08 RX ORDER — POTASSIUM CHLORIDE 7.45 MG/ML
10 INJECTION INTRAVENOUS PRN
Status: DISCONTINUED | OUTPATIENT
Start: 2024-06-08 | End: 2024-06-10 | Stop reason: HOSPADM

## 2024-06-08 RX ORDER — FUROSEMIDE 40 MG/1
40 TABLET ORAL EVERY EVENING
Status: ON HOLD | COMMUNITY
End: 2024-06-10 | Stop reason: HOSPADM

## 2024-06-08 RX ORDER — SODIUM CHLORIDE 9 MG/ML
INJECTION, SOLUTION INTRAVENOUS PRN
Status: DISCONTINUED | OUTPATIENT
Start: 2024-06-08 | End: 2024-06-10 | Stop reason: HOSPADM

## 2024-06-08 RX ORDER — SODIUM CHLORIDE 0.9 % (FLUSH) 0.9 %
5-40 SYRINGE (ML) INJECTION EVERY 12 HOURS SCHEDULED
Status: DISCONTINUED | OUTPATIENT
Start: 2024-06-08 | End: 2024-06-10 | Stop reason: HOSPADM

## 2024-06-08 RX ORDER — FUROSEMIDE 10 MG/ML
80 INJECTION INTRAMUSCULAR; INTRAVENOUS EVERY 8 HOURS
Status: DISCONTINUED | OUTPATIENT
Start: 2024-06-08 | End: 2024-06-09

## 2024-06-08 RX ORDER — ACETAMINOPHEN 650 MG/1
650 SUPPOSITORY RECTAL EVERY 6 HOURS PRN
Status: DISCONTINUED | OUTPATIENT
Start: 2024-06-08 | End: 2024-06-10 | Stop reason: HOSPADM

## 2024-06-08 RX ORDER — FUROSEMIDE 10 MG/ML
80 INJECTION INTRAMUSCULAR; INTRAVENOUS 2 TIMES DAILY
Status: DISCONTINUED | OUTPATIENT
Start: 2024-06-08 | End: 2024-06-08

## 2024-06-08 RX ORDER — ATORVASTATIN CALCIUM 20 MG/1
80 TABLET, FILM COATED ORAL DAILY
Status: DISCONTINUED | OUTPATIENT
Start: 2024-06-08 | End: 2024-06-10 | Stop reason: HOSPADM

## 2024-06-08 RX ORDER — ONDANSETRON 4 MG/1
4 TABLET, ORALLY DISINTEGRATING ORAL EVERY 8 HOURS PRN
Status: DISCONTINUED | OUTPATIENT
Start: 2024-06-08 | End: 2024-06-10 | Stop reason: HOSPADM

## 2024-06-08 RX ADMIN — APIXABAN 5 MG: 5 TABLET, FILM COATED ORAL at 20:17

## 2024-06-08 RX ADMIN — SACUBITRIL AND VALSARTAN 1 TABLET: 97; 103 TABLET, FILM COATED ORAL at 08:10

## 2024-06-08 RX ADMIN — ASPIRIN 81 MG: 81 TABLET, CHEWABLE ORAL at 08:10

## 2024-06-08 RX ADMIN — PERFLUTREN 1.5 ML: 6.52 INJECTION, SUSPENSION INTRAVENOUS at 15:40

## 2024-06-08 RX ADMIN — SODIUM CHLORIDE, PRESERVATIVE FREE 10 ML: 5 INJECTION INTRAVENOUS at 08:11

## 2024-06-08 RX ADMIN — CARVEDILOL 25 MG: 12.5 TABLET, FILM COATED ORAL at 08:10

## 2024-06-08 RX ADMIN — ATORVASTATIN CALCIUM 80 MG: 20 TABLET, FILM COATED ORAL at 08:10

## 2024-06-08 RX ADMIN — CARVEDILOL 25 MG: 12.5 TABLET, FILM COATED ORAL at 16:34

## 2024-06-08 RX ADMIN — SODIUM CHLORIDE, PRESERVATIVE FREE 5 ML: 5 INJECTION INTRAVENOUS at 20:17

## 2024-06-08 RX ADMIN — ASPIRIN 325 MG: 325 TABLET ORAL at 01:57

## 2024-06-08 RX ADMIN — FUROSEMIDE 80 MG: 10 INJECTION, SOLUTION INTRAMUSCULAR; INTRAVENOUS at 16:31

## 2024-06-08 RX ADMIN — FUROSEMIDE 80 MG: 10 INJECTION, SOLUTION INTRAMUSCULAR; INTRAVENOUS at 08:10

## 2024-06-08 RX ADMIN — APIXABAN 5 MG: 5 TABLET, FILM COATED ORAL at 08:10

## 2024-06-08 NOTE — ED NOTES
Bedside and Verbal shift change report given to Andrew (oncoming nurse) by Afua (offgoing nurse). Report included the following information ED Encounter Summary, ED SBAR, Adult Overview, MAR, and Recent Results.

## 2024-06-08 NOTE — H&P
Hospitalist Admission Note    NAME: Joaquin Aceves   :  1960   MRN:  [id number]     Date/Time:  2024 5:50 AM    Patient PCP: Ismael Leonard DO    Please note that this dictation was completed with [employer], the computer voice recognition software.  Quite often unanticipated grammatical, syntax, homophones, and other interpretive errors are inadvertently transcribed by the computer software.  Please disregard these errors.  Please excuse any errors that have escaped final proofreading  ________________________________________________________________________    Given the patient's current clinical presentation, I have a high level of concern for decompensation if discharged from the emergency department.  Complex decision making was performed, which includes reviewing the patient's available past medical records, laboratory results, and x-ray films.       My assessment of this patient's clinical condition and my plan of care is as follows.    Assessment / Plan:    HFrEF 20-25% with acute exacerbation  Weight gain 30#  SOB, b/l leg edema, orthopnea  --lasix 80mg IV bid; per patient on lasix 80mg AM and 40mg PO at home  ---monitor daily weight, I/O  --cardiology consulted by ER.  No longer on jardiance or aldactone for unclear reason  --cont entresto    CHAU on CKD 3  --Cr 2.28.  Monitor with diuresis.    --nephrology consult  --cont entresto for now but would consider d/c if Cr continue to worsen    P. Afib  --continue eliquis    CAD  --cont aspirin, lipitor, coreg      Medical Decision Making:   I have personally reviewed the radiographs, laboratory data in Epic and decisions and statements above are based partially on this personal interpretation.    Drug monitoring:       Code Status: DNR/DNI dw patient but does not have advance directive.  Surrogate decision maker shaji Arrington.  DVT Prophylaxis:  eliquis  GI Prophylaxis: not indicated         Subjective:   CHIEF COMPLAINT: \"SOB, leg swelling, abdominal swelling,

## 2024-06-08 NOTE — PLAN OF CARE
Problem: Pain  Goal: Verbalizes/displays adequate comfort level or baseline comfort level  Outcome: Progressing     Problem: Pain  Goal: Verbalizes/displays adequate comfort level or baseline comfort level  Outcome: Progressing     Problem: Pain  Goal: Verbalizes/displays adequate comfort level or baseline comfort level  Outcome: Progressing

## 2024-06-08 NOTE — ED PROVIDER NOTES
[facility] EMERGENCY DEPT  EMERGENCY DEPARTMENT ENCOUNTER      Pt Name: Joaquin Aceves  MRN: [id number]  Birthdate 1960  Date of evaluation: 6/7/2024  Provider: Jimmy Niño MD    CHIEF COMPLAINT       Chief Complaint   Patient presents with    Shortness of Breath         HISTORY OF PRESENT ILLNESS   (Location/Symptom, Timing/Onset, Context/Setting, Quality, Duration, Modifying Factors, Severity)  Note limiting factors.   64-year-old male with a past medical history significant for CHF, coronary disease, paroxysmal atrial fibrillation, chronic anticoagulation with Eliquis who presents to the ER with a month-long history of shortness of breath on exertion and at rest with orthopnea and PND.  The patient also states that he has been taking his medication without any relief of symptoms and discomfort.  He denies any fever, headache, sore throat, cough or congestion, nausea, vomiting, abdominal pain, extremity weakness or numbness, sick contact, skin rash, travel.  The patient is concerned about significant weight gain from 160- pounds 190 pds over the last month.            Review of External Medical Records:     Nursing Notes were reviewed.    REVIEW OF SYSTEMS    (2-9 systems for level 4, 10 or more for level 5)     Review of Systems   All other systems reviewed and are negative.      Except as noted above the remainder of the review of systems was reviewed and negative.       PAST MEDICAL HISTORY     Past Medical History:   Diagnosis Date    CAD (coronary artery disease)     Hypertension     Paroxysmal A-fib (HCC)     Systolic CHF (HCC)          SURGICAL HISTORY       Past Surgical History:   Procedure Laterality Date    [place] UNLISTED PROCEDURE CARDIAC SURGERY      stents         CURRENT MEDICATIONS       Previous Medications    APIXABAN (ELIQUIS) 5 MG TABS TABLET    Take by mouth 2 times daily    ASPIRIN 81 MG CHEWABLE TABLET    Take 1 tablet by mouth daily    ATORVASTATIN (LIPITOR) 80 MG TABLET    Take 1 tablet by

## 2024-06-08 NOTE — CONSULTS
NEPHROLOGY CONSULT NOTE     Patient: Joaquin Aceves MRN: [id number]  PCP: Ismael Leonard DO   :     1960  Age:   64 y.o.  Sex:  male      Referring physician: Joaquin Russell MD  Reason for consultation:   Admission Date: 2024  9:56 PM  LOS: 0 days        ASSESSMENT and PLAN :   CHAU vs CKD progression from CRS  Fluid overload  CHFrEF 20-25%  HTN    Plan:  Increase Lasix 80 mg IV Q8HR  Check 2D Echo and UPC, PTH  USG kidney and bladder  Daily labs and monitor of UOP  D/w patient                  Subjective:   HPI: Joaquin cAeves is a 64 y.o.  male with PMHx CAD, p. Afib, HFrEF 20-25%, CKD 3b last cr 1.7 from 10/23  presents with progressive b/l leg swelling and abdominal swelling x 3 months with 30# weight gain, SOB, orthopnea.   Labs show cr 2.28 and BNP 4K  Patient is never seen by nephrology in the past  His last EF 20-25%      Past Medical Hx:   Past Medical History:   Diagnosis Date    CAD (coronary artery disease)     Hypertension     Paroxysmal A-fib (HCC)     Systolic CHF (HCC)         Past Surgical Hx:     Past Surgical History:   Procedure Laterality Date    IN UNLISTED PROCEDURE CARDIAC SURGERY      stents       Medications:  Prior to Admission medications    Medication Sig Start Date End Date Taking? Authorizing Provider   furosemide (LASIX) 40 MG tablet Take 1 tablet by mouth every evening   Yes ProviderKarma MD   furosemide (LASIX) 40 MG tablet Take 2 tablets by mouth daily Taking 80mg am and 40mg pm   Yes Karma Henson MD   carvedilol (COREG) 25 MG tablet Take 1 tablet by mouth 2 times daily (with meals) 10/19/23   Morgan Sal MD   sacubitril-valsartan (ENTRESTO)  MG per tablet Take 1 tablet by mouth 2 times daily 10/19/23   Morgan Sal MD   empagliflozin (JARDIANCE) 10 MG tablet Take 1 tablet by mouth daily  Patient not taking: Reported on 2024 10/19/23   Morgan Sal MD   apixaban (ELIQUIS) 5 MG TABS tablet Take by mouth 2 times daily 
Pulse Resp BP SpO2   06/08/24 0731 97.6 °F (36.4 °C) 58 17 129/70 94 %   06/08/24 0719 -- 59 -- -- --   06/08/24 0358 97.5 °F (36.4 °C) 64 18 128/84 96 %   06/08/24 0320 -- 80 -- -- --   06/08/24 0313 97.9 °F (36.6 °C) 57 18 122/77 100 %   06/08/24 0215 -- 64 13 103/76 94 %   06/08/24 0200 -- 64 15 111/77 95 %   06/08/24 0145 -- 67 12 107/68 97 %   06/08/24 0130 -- 67 15 101/69 93 %   06/08/24 0115 -- 67 14 105/75 95 %   06/08/24 0100 -- 67 14 108/69 --   06/08/24 0030 -- 63 14 128/70 94 %   06/08/24 0000 -- 68 12 123/74 91 %   06/07/24 2345 -- 68 12 114/83 91 %   06/07/24 2330 -- 69 10 121/79 91 %   06/07/24 2315 -- 72 15 125/79 90 %   06/07/24 2303 -- -- -- (!) 141/79 --   06/07/24 2301 -- 70 -- -- --   06/07/24 2230 -- 72 16 132/88 94 %      ___________________________________________________________    Cardiac testing  10/16/23    ECHO (TTE) COMPLETE (PRN CONTRAST/BUBBLE/STRAIN/3D) 10/17/2023  3:09 PM (Final)    Interpretation Summary    Left Ventricle: Severely reduced left ventricular systolic function with a visually estimated EF of 20 - 25%. Left ventricle size is normal. Mildly increased wall thickness. See diagram for wall motion findings. Grade III diastolic dysfunction with increased LAP.    Right Ventricle: Not well visualized.    Aortic Valve: Trileaflet valve. Mildly calcified cusp. Sclerosis of the aortic valve cusp. Mild regurgitation.    Mitral Valve: Mild regurgitation.    Left Atrium: Left atrium is dilated.    Contrast used: Definity.    Signed by: Biju Gruber DO on 10/17/2023  3:09 PM   No results found for this or any previous visit.   [unfilled]   Most recent HS troponins:  Recent Labs     06/07/24  2153   TROPHS 20     Lab Results   Component Value Date    CREATININE 2.28 (H) 06/07/2024    CREATININE 1.70 (H) 10/18/2023    CREATININE 1.46 (H) 10/17/2023      No results found for: \"ALMA\", \"CREAPOC\"  Lab Results   Component Value Date/Time    HGB 15.7 06/07/2024 09:53 PM      Physical

## 2024-06-08 NOTE — ED TRIAGE NOTES
CC fluid overload/ sob. Decreased sleeping due to sob   PCP has been trying to get fluid off him reports he has been taking furosemide    Reports coughing   Hx of heart failure

## 2024-06-09 LAB
ANION GAP SERPL CALC-SCNC: 3 MMOL/L (ref 5–15)
BASOPHILS # BLD: 0.1 K/UL (ref 0–0.1)
BASOPHILS NFR BLD: 1 % (ref 0–1)
BUN SERPL-MCNC: 34 MG/DL (ref 6–20)
BUN/CREAT SERPL: 18 (ref 12–20)
CALCIUM SERPL-MCNC: 8.6 MG/DL (ref 8.5–10.1)
CHLORIDE SERPL-SCNC: 104 MMOL/L (ref 97–108)
CO2 SERPL-SCNC: 32 MMOL/L (ref 21–32)
CREAT SERPL-MCNC: 1.84 MG/DL (ref 0.7–1.3)
EKG ATRIAL RATE: 79 BPM
EKG DIAGNOSIS: NORMAL
EKG P AXIS: 65 DEGREES
EKG P-R INTERVAL: 178 MS
EKG Q-T INTERVAL: 438 MS
EKG QRS DURATION: 112 MS
EKG QTC CALCULATION (BAZETT): 502 MS
EKG R AXIS: 61 DEGREES
EKG T AXIS: 190 DEGREES
EKG VENTRICULAR RATE: 79 BPM
EOSINOPHIL # BLD: 0.5 K/UL (ref 0–0.4)
EOSINOPHIL NFR BLD: 6 % (ref 0–7)
ERYTHROCYTE [DISTWIDTH] IN BLOOD BY AUTOMATED COUNT: 13.1 % (ref 11.5–14.5)
GLUCOSE SERPL-MCNC: 100 MG/DL (ref 65–100)
HCT VFR BLD AUTO: 43.9 % (ref 36.6–50.3)
HGB BLD-MCNC: 15 G/DL (ref 12.1–17)
IMM GRANULOCYTES # BLD AUTO: 0 K/UL (ref 0–0.04)
IMM GRANULOCYTES NFR BLD AUTO: 0 % (ref 0–0.5)
LYMPHOCYTES # BLD: 3 K/UL (ref 0.8–3.5)
LYMPHOCYTES NFR BLD: 34 % (ref 12–49)
MCH RBC QN AUTO: 31.2 PG (ref 26–34)
MCHC RBC AUTO-ENTMCNC: 34.2 G/DL (ref 30–36.5)
MCV RBC AUTO: 91.3 FL (ref 80–99)
MONOCYTES # BLD: 1 K/UL (ref 0–1)
MONOCYTES NFR BLD: 12 % (ref 5–13)
NEUTS SEG # BLD: 4 K/UL (ref 1.8–8)
NEUTS SEG NFR BLD: 47 % (ref 32–75)
NRBC # BLD: 0 K/UL (ref 0–0.01)
NRBC BLD-RTO: 0 PER 100 WBC
NT PRO BNP: 822 PG/ML
PLATELET # BLD AUTO: 221 K/UL (ref 150–400)
PMV BLD AUTO: 9.8 FL (ref 8.9–12.9)
POTASSIUM SERPL-SCNC: 3.9 MMOL/L (ref 3.5–5.1)
RBC # BLD AUTO: 4.81 M/UL (ref 4.1–5.7)
SODIUM SERPL-SCNC: 139 MMOL/L (ref 136–145)
WBC # BLD AUTO: 8.6 K/UL (ref 4.1–11.1)

## 2024-06-09 PROCEDURE — 99233 SBSQ HOSP IP/OBS HIGH 50: CPT | Performed by: SPECIALIST

## 2024-06-09 PROCEDURE — 6370000000 HC RX 637 (ALT 250 FOR IP): Performed by: HOSPITALIST

## 2024-06-09 PROCEDURE — 94761 N-INVAS EAR/PLS OXIMETRY MLT: CPT

## 2024-06-09 PROCEDURE — 2700000000 HC OXYGEN THERAPY PER DAY

## 2024-06-09 PROCEDURE — 6360000002 HC RX W HCPCS: Performed by: INTERNAL MEDICINE

## 2024-06-09 PROCEDURE — 80048 BASIC METABOLIC PNL TOTAL CA: CPT

## 2024-06-09 PROCEDURE — 36415 COLL VENOUS BLD VENIPUNCTURE: CPT

## 2024-06-09 PROCEDURE — 85025 COMPLETE CBC W/AUTO DIFF WBC: CPT

## 2024-06-09 PROCEDURE — 83880 ASSAY OF NATRIURETIC PEPTIDE: CPT

## 2024-06-09 PROCEDURE — 2580000003 HC RX 258: Performed by: HOSPITALIST

## 2024-06-09 PROCEDURE — 1100000000 HC RM PRIVATE

## 2024-06-09 RX ORDER — FUROSEMIDE 10 MG/ML
80 INJECTION INTRAMUSCULAR; INTRAVENOUS 2 TIMES DAILY
Status: DISCONTINUED | OUTPATIENT
Start: 2024-06-09 | End: 2024-06-10 | Stop reason: HOSPADM

## 2024-06-09 RX ADMIN — APIXABAN 5 MG: 5 TABLET, FILM COATED ORAL at 20:22

## 2024-06-09 RX ADMIN — SODIUM CHLORIDE, PRESERVATIVE FREE 5 ML: 5 INJECTION INTRAVENOUS at 20:23

## 2024-06-09 RX ADMIN — FUROSEMIDE 80 MG: 10 INJECTION, SOLUTION INTRAMUSCULAR; INTRAVENOUS at 00:09

## 2024-06-09 RX ADMIN — ATORVASTATIN CALCIUM 80 MG: 20 TABLET, FILM COATED ORAL at 08:24

## 2024-06-09 RX ADMIN — CARVEDILOL 25 MG: 12.5 TABLET, FILM COATED ORAL at 08:24

## 2024-06-09 RX ADMIN — APIXABAN 5 MG: 5 TABLET, FILM COATED ORAL at 08:24

## 2024-06-09 RX ADMIN — CARVEDILOL 25 MG: 12.5 TABLET, FILM COATED ORAL at 18:05

## 2024-06-09 RX ADMIN — FUROSEMIDE 80 MG: 10 INJECTION, SOLUTION INTRAMUSCULAR; INTRAVENOUS at 18:05

## 2024-06-09 RX ADMIN — ASPIRIN 81 MG: 81 TABLET, CHEWABLE ORAL at 08:24

## 2024-06-09 RX ADMIN — FUROSEMIDE 80 MG: 10 INJECTION, SOLUTION INTRAMUSCULAR; INTRAVENOUS at 08:24

## 2024-06-09 RX ADMIN — SODIUM CHLORIDE, PRESERVATIVE FREE 10 ML: 5 INJECTION INTRAVENOUS at 08:24

## 2024-06-10 VITALS
HEIGHT: 69 IN | TEMPERATURE: 97.7 F | HEART RATE: 67 BPM | SYSTOLIC BLOOD PRESSURE: 150 MMHG | WEIGHT: 196 LBS | OXYGEN SATURATION: 97 % | RESPIRATION RATE: 16 BRPM | BODY MASS INDEX: 29.03 KG/M2 | DIASTOLIC BLOOD PRESSURE: 98 MMHG

## 2024-06-10 DIAGNOSIS — I50.23 ACUTE ON CHRONIC HFREF (HEART FAILURE WITH REDUCED EJECTION FRACTION) (HCC): ICD-10-CM

## 2024-06-10 DIAGNOSIS — I50.23 ACUTE ON CHRONIC SYSTOLIC (CONGESTIVE) HEART FAILURE (HCC): Primary | ICD-10-CM

## 2024-06-10 PROBLEM — J96.01 ACUTE RESPIRATORY FAILURE WITH HYPOXIA (HCC): Status: ACTIVE | Noted: 2024-06-10

## 2024-06-10 LAB
COMMENT:: NORMAL
NT PRO BNP: 1154 PG/ML
SPECIMEN HOLD: NORMAL

## 2024-06-10 PROCEDURE — 2580000003 HC RX 258: Performed by: HOSPITALIST

## 2024-06-10 PROCEDURE — 99232 SBSQ HOSP IP/OBS MODERATE 35: CPT | Performed by: STUDENT IN AN ORGANIZED HEALTH CARE EDUCATION/TRAINING PROGRAM

## 2024-06-10 PROCEDURE — 6360000002 HC RX W HCPCS: Performed by: INTERNAL MEDICINE

## 2024-06-10 PROCEDURE — 6370000000 HC RX 637 (ALT 250 FOR IP): Performed by: STUDENT IN AN ORGANIZED HEALTH CARE EDUCATION/TRAINING PROGRAM

## 2024-06-10 PROCEDURE — 36415 COLL VENOUS BLD VENIPUNCTURE: CPT

## 2024-06-10 PROCEDURE — 83880 ASSAY OF NATRIURETIC PEPTIDE: CPT

## 2024-06-10 PROCEDURE — 94761 N-INVAS EAR/PLS OXIMETRY MLT: CPT

## 2024-06-10 PROCEDURE — 6370000000 HC RX 637 (ALT 250 FOR IP): Performed by: HOSPITALIST

## 2024-06-10 RX ORDER — FUROSEMIDE 80 MG
80 TABLET ORAL 2 TIMES DAILY
Qty: 60 TABLET | Refills: 1 | Status: SHIPPED | OUTPATIENT
Start: 2024-06-10 | End: 2024-07-10

## 2024-06-10 RX ORDER — SPIRONOLACTONE 25 MG/1
25 TABLET ORAL DAILY
Qty: 30 TABLET | Refills: 1 | Status: SHIPPED | OUTPATIENT
Start: 2024-06-11 | End: 2024-07-11

## 2024-06-10 RX ORDER — SPIRONOLACTONE 25 MG/1
25 TABLET ORAL DAILY
Status: DISCONTINUED | OUTPATIENT
Start: 2024-06-10 | End: 2024-06-10 | Stop reason: HOSPADM

## 2024-06-10 RX ADMIN — ASPIRIN 81 MG: 81 TABLET, CHEWABLE ORAL at 08:33

## 2024-06-10 RX ADMIN — SACUBITRIL AND VALSARTAN 1 TABLET: 24; 26 TABLET, FILM COATED ORAL at 11:23

## 2024-06-10 RX ADMIN — SODIUM CHLORIDE, PRESERVATIVE FREE 10 ML: 5 INJECTION INTRAVENOUS at 08:34

## 2024-06-10 RX ADMIN — APIXABAN 5 MG: 5 TABLET, FILM COATED ORAL at 08:33

## 2024-06-10 RX ADMIN — SPIRONOLACTONE 25 MG: 25 TABLET ORAL at 11:23

## 2024-06-10 RX ADMIN — FUROSEMIDE 80 MG: 10 INJECTION, SOLUTION INTRAMUSCULAR; INTRAVENOUS at 08:33

## 2024-06-10 RX ADMIN — ATORVASTATIN CALCIUM 80 MG: 20 TABLET, FILM COATED ORAL at 08:33

## 2024-06-10 RX ADMIN — CARVEDILOL 25 MG: 12.5 TABLET, FILM COATED ORAL at 08:33

## 2024-06-10 ASSESSMENT — PAIN SCALES - GENERAL
PAINLEVEL_OUTOF10: 0
PAINLEVEL_OUTOF10: 0

## 2024-06-10 NOTE — PROGRESS NOTES
MARIANA SERRATO [facility]  [address] [place] [address]  [phone]      Hospitalist  Progress Note      NAME:       Joaquin Aceves   :        1960  MRM:        [id number]    Date of service: 2024      Subjective: Patient seen and examined by me. Patient admitted with CHF exacerbation. He had experienced progressive ankle and abdominal swelling associated with dyspnea. Symptoms improving. Afebrile     Objective:    Vital Signs:    /79   Pulse 62   Temp 98.2 °F (36.8 °C)   Resp 17   Ht 1.753 m (5' 9\")   Wt 88.9 kg (196 lb)   SpO2 93%   BMI 28.94 kg/m²        Intake/Output Summary (Last 24 hours) at 2024 0747  Last data filed at 2024 0306  Gross per 24 hour   Intake 620 ml   Output 350 ml   Net 270 ml          Current inpatient medications reviewed:  Current Facility-Administered Medications   Medication Dose Route Frequency    sodium chloride flush 0.9 % injection 5-40 mL  5-40 mL IntraVENous 2 times per day    sodium chloride flush 0.9 % injection 5-40 mL  5-40 mL IntraVENous PRN    0.9 % sodium chloride infusion   IntraVENous PRN    potassium chloride (KLOR-CON) extended release tablet 40 mEq  40 mEq Oral PRN    Or    potassium bicarb-citric acid (EFFER-K) effervescent tablet 40 mEq  40 mEq Oral PRN    Or    potassium chloride 10 mEq/100 mL IVPB (Peripheral Line)  10 mEq IntraVENous PRN    magnesium sulfate 2000 mg in 50 mL IVPB premix  2,000 mg IntraVENous PRN    ondansetron (ZOFRAN-ODT) disintegrating tablet 4 mg  4 mg Oral Q8H PRN    Or    ondansetron (ZOFRAN) injection 4 mg  4 mg IntraVENous Q6H PRN    polyethylene glycol (GLYCOLAX) packet 17 g  17 g Oral Daily PRN    acetaminophen (TYLENOL) tablet 650 mg  650 mg Oral Q6H PRN    Or    acetaminophen (TYLENOL) suppository 650 mg  650 mg Rectal Q6H PRN    aspirin chewable tablet 81 mg  81 mg Oral Daily    apixaban (ELIQUIS) tablet 5 mg  5 mg 
                                                             MARIANA SERRATO [facility]  [address] [place] [address]  [phone]      Hospitalist  Progress Note      NAME:       Joaquin Aceves   :        1960  MRM:        [id number]    Date of service: 2024      Subjective: Patient seen and examined by me. Patient admitted with CHF exacerbation. He had experienced progressive ankle and abdominal swelling associated with dyspnea. No chest pain. No cough or fever.      Objective:    Vital Signs:    /70   Pulse 58   Temp 97.6 °F (36.4 °C) (Oral)   Resp 17   Ht 1.753 m (5' 9\")   Wt 89.1 kg (196 lb 6.9 oz)   SpO2 94%   BMI 29.01 kg/m²        Intake/Output Summary (Last 24 hours) at 2024 1124  Last data filed at 2024 0350  Gross per 24 hour   Intake 200 ml   Output 500 ml   Net -300 ml        Current inpatient medications reviewed:  Current Facility-Administered Medications   Medication Dose Route Frequency    sodium chloride flush 0.9 % injection 5-40 mL  5-40 mL IntraVENous 2 times per day    sodium chloride flush 0.9 % injection 5-40 mL  5-40 mL IntraVENous PRN    0.9 % sodium chloride infusion   IntraVENous PRN    potassium chloride (KLOR-CON) extended release tablet 40 mEq  40 mEq Oral PRN    Or    potassium bicarb-citric acid (EFFER-K) effervescent tablet 40 mEq  40 mEq Oral PRN    Or    potassium chloride 10 mEq/100 mL IVPB (Peripheral Line)  10 mEq IntraVENous PRN    magnesium sulfate 2000 mg in 50 mL IVPB premix  2,000 mg IntraVENous PRN    ondansetron (ZOFRAN-ODT) disintegrating tablet 4 mg  4 mg Oral Q8H PRN    Or    ondansetron (ZOFRAN) injection 4 mg  4 mg IntraVENous Q6H PRN    polyethylene glycol (GLYCOLAX) packet 17 g  17 g Oral Daily PRN    acetaminophen (TYLENOL) tablet 650 mg  650 mg Oral Q6H PRN    Or    acetaminophen (TYLENOL) suppository 650 mg  650 mg Rectal Q6H PRN    furosemide (LASIX) injection 80 mg  80 mg IntraVENous BID    aspirin 
                            MARIANA SERRATO [facility]    Joaquin Aceves  YOB: 1960          Assessment & Plan:     CHAU, better  CKD 3b d/t CRS  HFrEF  HTN  CAD  PAF    Rec:  Continue diuretics  Watch labs  No need for RRT       Subjective:   CC: CHAU  HPI: No new labs but Cr better yesterday.  No sob or edema.    Current Facility-Administered Medications   Medication Dose Route Frequency    spironolactone (ALDACTONE) tablet 25 mg  25 mg Oral Daily    sacubitril-valsartan (ENTRESTO) 24-26 MG per tablet 1 tablet  1 tablet Oral BID    furosemide (LASIX) injection 80 mg  80 mg IntraVENous BID    sodium chloride flush 0.9 % injection 5-40 mL  5-40 mL IntraVENous 2 times per day    sodium chloride flush 0.9 % injection 5-40 mL  5-40 mL IntraVENous PRN    0.9 % sodium chloride infusion   IntraVENous PRN    potassium chloride (KLOR-CON) extended release tablet 40 mEq  40 mEq Oral PRN    Or    potassium bicarb-citric acid (EFFER-K) effervescent tablet 40 mEq  40 mEq Oral PRN    Or    potassium chloride 10 mEq/100 mL IVPB (Peripheral Line)  10 mEq IntraVENous PRN    magnesium sulfate 2000 mg in 50 mL IVPB premix  2,000 mg IntraVENous PRN    ondansetron (ZOFRAN-ODT) disintegrating tablet 4 mg  4 mg Oral Q8H PRN    Or    ondansetron (ZOFRAN) injection 4 mg  4 mg IntraVENous Q6H PRN    polyethylene glycol (GLYCOLAX) packet 17 g  17 g Oral Daily PRN    acetaminophen (TYLENOL) tablet 650 mg  650 mg Oral Q6H PRN    Or    acetaminophen (TYLENOL) suppository 650 mg  650 mg Rectal Q6H PRN    aspirin chewable tablet 81 mg  81 mg Oral Daily    apixaban (ELIQUIS) tablet 5 mg  5 mg Oral BID    atorvastatin (LIPITOR) tablet 80 mg  80 mg Oral Daily    carvedilol (COREG) tablet 25 mg  25 mg Oral BID WC          Objective:     Vitals:  Blood pressure (!) 150/98, pulse 67, temperature 97.7 °F (36.5 °C), temperature source Oral, resp. rate 16, height 1.753 m (5' 9\"), weight 88.9 kg (196 lb), SpO2 97 %.  Temp (24hrs), 
  Physician Progress Note      PATIENT:               HECTOR SONI  [facility] #:                  [id number]  :                       1960  ADMIT DATE:       2024 9:56 PM  DISCH DATE:        6/10/2024 1:26 PM  RESPONDING  PROVIDER #:        Hector Rivero MD          QUERY TEXT:    Patient admitted with acute on chronic CHF, noted to have paroxysmal atrial   fibrillation and is maintained on apixaban. If possible, please document in   progress notes and discharge summary if you are evaluating and/or treating any   of the following:    The medical record reflects the following:  Risk Factors: HTN, CKD2024, H&P, Dr. Nichole Ferreira MD:  \"P. Afib.  continue   eliquis.\"  Treatment: apixaban    Thank you,  GARY Tena RN, CDS  [email or web address]  Options provided:  -- Secondary hypercoagulable state in a patient with atrial fibrillation  -- Other - I will add my own diagnosis  -- Disagree - Not applicable / Not valid  -- Disagree - Clinically unable to determine / Unknown  -- Refer to Clinical Documentation Reviewer    PROVIDER RESPONSE TEXT:    This patient has secondary hypercoagulable state in a patient with atrial   fibrillation.    Query created by: Esme Tena on 6/10/2024 1:14 PM      Electronically signed by:  Hector Rivero MD 6/10/2024 1:33 PM          
1130: Patients BP is 150/98. MD made aware.     1237: Discharge instructions given to patient. Time for questions allowed. IV removed. Patients ride on the way.   
Cr better and UPC 0.1 gm. 2D Echo: EF improved to 50%. Reduced Loop to 80 mg BID  
Patient informed provider needs urine for lab work several hours ago. Patient was given urinal and educated on the need. Patient expressed understanding. Patient has urinated in bathroom several times and has not urinated in urinal for sample. Provider made aware  
Urine collect   
Unremarkable.  SMALL BOWEL: No dilatation or wall thickening.  COLON: No dilatation or wall thickening. Sigmoid diverticulosis.  APPENDIX: Normal.  PERITONEUM: Small pelvic free fluid. No pneumoperitoneum.  RETROPERITONEUM: No lymphadenopathy or aortic aneurysm.  REPRODUCTIVE ORGANS: Unremarkable.  URINARY BLADDER: High attenuation urine within the urinary bladder. No  visualized mass.  BONES: No destructive bone lesion.  ABDOMINAL WALL: No mass or hernia.  ADDITIONAL COMMENTS: N/A    Impression  IMPRESSION:  1. 3.7 x 3.0 x 3.1 cm solid-appearing left renal mass, highly suspicious for  renal cell carcinoma; urology consultation is suggested.  2. Small pelvic free fluid.  3. Sigmoid diverticulosis, without evidence of acute diverticulitis.  4. Small right pleural effusion.  5. High attenuation urine within the urinary bladder could indicate hemorrhage  or proteinaceous debris; correlate with clinical findings.      Lab Results   Component Value Date/Time     06/09/2024 05:46 AM    K 3.9 06/09/2024 05:46 AM     06/09/2024 05:46 AM    CO2 32 06/09/2024 05:46 AM    BUN 34 06/09/2024 05:46 AM    CREATININE 1.84 06/09/2024 05:46 AM    GLUCOSE 100 06/09/2024 05:46 AM    CALCIUM 8.6 06/09/2024 05:46 AM    LABGLOM 41 06/09/2024 05:46 AM    LABGLOM 45 10/18/2023 08:03 AM      Lab Results   Component Value Date    BNP 4,558 (H) 11/25/2020     Lab Results   Component Value Date    WBC 8.6 06/09/2024    HGB 15.0 06/09/2024    HCT 43.9 06/09/2024    MCV 91.3 06/09/2024     06/09/2024     No results for input(s): \"CHOL\", \"HDLC\", \"HBA1C\" in the last 72 hours.    Invalid input(s): \"TGL\", \"LDLC\"    Current meds:    Current Facility-Administered Medications:     furosemide (LASIX) injection 80 mg, 80 mg, IntraVENous, BID, John Blank MD, 80 mg at 06/09/24 1805    sodium chloride flush 0.9 % injection 5-40 mL, 5-40 mL, IntraVENous, 2 times per day, Nichole Ferreira MD, 5 mL at 06/09/24 2023    sodium chloride 
Systems  [x]All other systems reviewed and all negative except as written in HPI  [] Patient unable to provide secondary to condition   has a past medical history of CAD (coronary artery disease), Hypertension, Paroxysmal A-fib (HCC), and Systolic CHF (HCC).    has a past surgical history that includes pr unlisted procedure cardiac surgery.   Social Hx:  reports that he has been smoking. He has quit using smokeless tobacco. He reports that he does not currently use alcohol. He reports that he does not use drugs.  Family Hx: family history includes Heart Disease in his father.  No Known Allergies    OBJECTIVE:Exam as above   Wt Readings from Last 3 Encounters:   06/08/24 88.9 kg (196 lb)   10/17/23 84.8 kg (187 lb)   12/18/20 82.6 kg (182 lb)     [unfilled]   Data Review:   Labs:  Lab Results   Component Value Date    CREATININE 1.84 (H) 06/09/2024    CREATININE 2.28 (H) 06/07/2024    CREATININE 1.70 (H) 10/18/2023      No results for input(s): \"CPK\" in the last 72 hours.    Invalid input(s): \"CKQMB\", \"CPKMB\", \"TROIQ\", \"BMPP\"  Recent Labs     06/07/24 2153 06/09/24  0546    139   K 3.5 3.9    104   CO2 31 32   BUN 31* 34*   PHOS 5.1*  --        Recent Labs     06/07/24 2153 06/09/24  0546   WBC 11.3* 8.6   HGB 15.7 15.0   HCT 44.7 43.9    221       Recent Labs     06/07/24 2153   INR 1.1       No results for input(s): \"CHOL\", \"HDLC\", \"HBA1C\" in the last 72 hours.    Invalid input(s): \"TGL\", \"LDLC\"  No results for input(s): \"ESR\", \"CRP\", \"TSH\" in the last 72 hours.  Radiology: XR Results (most recent):  @BSHSILASTIMGCAT(GFF7929:1)@  CT Results (most recent):  @BSHSILASTIMGCAT(ZOT9817:1)@  [facility] MD Baljit  Current meds:    Current Facility-Administered Medications:     sodium chloride flush 0.9 % injection 5-40 mL, 5-40 mL, IntraVENous, 2 times per day, Nichole Ferreira MD, 10 mL at 06/09/24 0824    sodium chloride flush 0.9 % injection 5-40 mL, 5-40 mL, IntraVENous, PRN, Nichole Ferreira

## 2024-06-10 NOTE — DISCHARGE SUMMARY
signed by THIAGO GREER    XR CHEST (2 VW)    Result Date: 6/7/2024  No acute cardiopulmonary abnormalities. Electronically signed by [facility] Course:     Mr. Aceves is a 64 y.o. admitted to [facility] and treated for the following:    Acute on chronic HFrEF (heart failure with reduced ejection fraction) / Ischemic cardiomyopathy POA: admitted with acute exacerbation, weight gain despite diuretic use. Last Echo done 10/2023 showed an EF of 20-25%. Pro-BNP was 4000 although CXR was clear. Repeat Echo showed an EF of 50%, moderate hypokinesis of the following segments: apical anterior. Aneurysm in the following segments: mid anterior. Abnormal diastolic function. He was reviewed by cardiology. Symptoms have improved. Continue oral Furosemide, Carvedilol and adjusted dose of Entresto. Not on Jardiance likely due to CKD. Low Na diet with fluid restriction. Outpatient follow up with cardiology.      CAD (coronary artery disease) /  Essential hypertension POA: hx CABG and PCI. Stable. Continue Asprin, Carvedilol and Atorvastatin     CHAU (acute kidney injury) / CKD III POA: Continue to monitor renal function with diuresis. SCR better. Seen here by nephrology. Outpatient renal function monitoring. Cards will follow during next visit.       Paroxysmal atrial flutter POA: continue Carvediol and Apixaban     Discharge Exam:  BP (!) 150/98   Pulse 67   Temp 97.7 °F (36.5 °C) (Oral)   Resp 16   Ht 1.753 m (5' 9\")   Wt 88.9 kg (196 lb)   SpO2 97%   BMI 28.94 kg/m²      Patient has been seen and examined.    General: well looking and in no acute distress  Pulm: clear breath sounds without wheezes  Card: no murmurs, normal S1, S2 without thrills, bruits   Abd:    soft, non-tender, normoactive bowel sounds  Skin: no rashes and skin turgor is good  Neuro: awake, alert and has a non focal     Activity: Activity as tolerated    Diet: cardiac diet    Current Discharge Medication List        START taking these medications

## 2024-06-10 NOTE — CARE COORDINATION
6/10/2024   CARE MANAGEMENT NOTE:  CM completed brief review of EMR and pt was discussed in IDRs.  Pt was admitted with CHAU.  Pt states that he has transport home and no identified needs at this writing.    RUR 10% Low risk for readmission    CM will continue to follow pt from afar and will assist with any discharge needs.  Vance

## 2024-06-10 NOTE — DISCHARGE INSTRUCTIONS
Hospital Medicine DISCHARGE INSTRUCTIONS    NAME: Joaquin Aceves   :  1960   MRN:  [id number]     Date:     6/10/2024    Admission date: 2024     Discharge date:  6/10/2024     Reason for your admission:  CHAU (acute kidney injury) ([facility]) [N17.9]  Acute on chronic systolic congestive heart failure ([facility]) [I50.23]  Acute respiratory failure with hypoxia ([facility]) [J96.01]  Acute on chronic systolic CHF (congestive heart failure) ([facility]) [I50.23]    Discharge Diagnoses:  Acute on chronic CHF exacerbation    DISCHARGE INSTRUCTIONS:    Thank you for allowing us to participate in your care. Your discharging Hospitalist is Joaquin Russell MD. You were admitted for evaluation and treatment for the above diagnoses.    Medications:     It is important that medications are taken exactly as they are prescribed on the discharge medication instructions and keep them your  in the bottles provided by the pharmacist.   Keep a list of the medication names, dosages, and times to be taken at all times.    Do not take other medications without consulting your doctor.     Recommended diet:  cardiac diet    Recommended activity: activity as tolerated    Post discharge care:    For questions regarding your Hospitalization or to contact the Hospital Medicine team, please call [phone].    Notify follow up health care provider or return to the emergency department if you cannot get hold of your doctor if you feel worse or experience symptoms similar to those that brought you to hospital    No follow-up provider specified.     Information obtained by :  I understand that if any problems occur once I am at home I am to contact my physician and I understand and acknowledge receipt of the instructions indicated above.                                                                                                                                           Physician's or R.N.'s Signature

## 2024-06-17 DIAGNOSIS — I50.23 ACUTE ON CHRONIC HFREF (HEART FAILURE WITH REDUCED EJECTION FRACTION) (HCC): ICD-10-CM

## (undated) DEVICE — TR BAND RADIAL ARTERY COMPRESSION DEVICE: Brand: TR BAND

## (undated) DEVICE — GUIDE CATH CONVEY 5FR JL3.5 -- 39228-661

## (undated) DEVICE — PROCEDURE KIT FLUID MGMT CUST MAINFOLD STRL

## (undated) DEVICE — CATH DIAG D 5F PIG 155 110CM 5 -- IMPULSE 16391-42

## (undated) DEVICE — GUIDE CATH CONVEY 5FR LT BU3.5 -- 39228-714

## (undated) DEVICE — SYRINGE MED 10ML RED POLYCARB BRL FIX M LUER CONN FLAT GRP

## (undated) DEVICE — SWAN-GANZ TRUE SIZE THERMODULTION CATHETER, 5F: Brand: SWAN-GANZ TRUE SIZE

## (undated) DEVICE — DRESSING HEMOSTATIC SFT INTVENT W/O SLT DBL WRP QUIKCLOT LF

## (undated) DEVICE — PACK PROCEDURE SURG HRT CATH

## (undated) DEVICE — TUBING PRSS MON L12IN PVC RIG NONEXPANDING M TO FEM CONN

## (undated) DEVICE — PINNACLE INTRODUCER SHEATH: Brand: PINNACLE

## (undated) DEVICE — GUIDE CATH CONVEY 5FR JL4 -- 39228-662

## (undated) DEVICE — GLIDESHEATH SLENDER STAINLESS STEEL KIT: Brand: GLIDESHEATH SLENDER

## (undated) DEVICE — ANGIOGRAPHIC CATHETER: Brand: IMPULSE™

## (undated) DEVICE — GUIDE CATH CONVEY 5FR JR4 -- 39228-686

## (undated) DEVICE — CATHETER GUID AL1 5 FRX100 CM SM ATRAUM SFT CONVEY

## (undated) DEVICE — INTRODUCER SHTH 5 FRX30 CM 7 CM W/ NIT WIRE REG MICRO-STICK